# Patient Record
Sex: MALE | Race: WHITE | Employment: OTHER | URBAN - METROPOLITAN AREA
[De-identification: names, ages, dates, MRNs, and addresses within clinical notes are randomized per-mention and may not be internally consistent; named-entity substitution may affect disease eponyms.]

---

## 2019-12-17 PROBLEM — I10 ESSENTIAL HYPERTENSION: Status: ACTIVE | Noted: 2019-12-17

## 2019-12-17 PROBLEM — R00.2 PALPITATIONS: Status: ACTIVE | Noted: 2019-12-17

## 2019-12-17 PROBLEM — E78.00 HYPERCHOLESTEREMIA: Status: ACTIVE | Noted: 2019-12-17

## 2019-12-17 PROBLEM — I25.10 CORONARY ARTERY DISEASE INVOLVING NATIVE CORONARY ARTERY OF NATIVE HEART WITHOUT ANGINA PECTORIS: Status: ACTIVE | Noted: 2019-12-17

## 2019-12-18 ENCOUNTER — OFFICE VISIT (OUTPATIENT)
Dept: CARDIOLOGY CLINIC | Age: 64
End: 2019-12-18
Payer: COMMERCIAL

## 2019-12-18 VITALS
BODY MASS INDEX: 29.52 KG/M2 | WEIGHT: 237.4 LBS | DIASTOLIC BLOOD PRESSURE: 80 MMHG | OXYGEN SATURATION: 91 % | HEIGHT: 75 IN | SYSTOLIC BLOOD PRESSURE: 110 MMHG | HEART RATE: 84 BPM

## 2019-12-18 DIAGNOSIS — R00.2 PALPITATIONS: ICD-10-CM

## 2019-12-18 DIAGNOSIS — I10 ESSENTIAL HYPERTENSION: ICD-10-CM

## 2019-12-18 DIAGNOSIS — E78.00 HYPERCHOLESTEREMIA: ICD-10-CM

## 2019-12-18 DIAGNOSIS — I25.10 CORONARY ARTERY DISEASE INVOLVING NATIVE CORONARY ARTERY OF NATIVE HEART WITHOUT ANGINA PECTORIS: Primary | ICD-10-CM

## 2019-12-18 PROCEDURE — 99244 OFF/OP CNSLTJ NEW/EST MOD 40: CPT | Performed by: INTERNAL MEDICINE

## 2019-12-18 PROCEDURE — 3017F COLORECTAL CA SCREEN DOC REV: CPT | Performed by: INTERNAL MEDICINE

## 2019-12-18 PROCEDURE — 93000 ELECTROCARDIOGRAM COMPLETE: CPT | Performed by: INTERNAL MEDICINE

## 2019-12-18 PROCEDURE — G8484 FLU IMMUNIZE NO ADMIN: HCPCS | Performed by: INTERNAL MEDICINE

## 2019-12-18 PROCEDURE — 1036F TOBACCO NON-USER: CPT | Performed by: INTERNAL MEDICINE

## 2019-12-18 PROCEDURE — G8417 CALC BMI ABV UP PARAM F/U: HCPCS | Performed by: INTERNAL MEDICINE

## 2019-12-18 PROCEDURE — G8598 ASA/ANTIPLAT THER USED: HCPCS | Performed by: INTERNAL MEDICINE

## 2019-12-18 PROCEDURE — G8427 DOCREV CUR MEDS BY ELIG CLIN: HCPCS | Performed by: INTERNAL MEDICINE

## 2019-12-18 PROCEDURE — 1123F ACP DISCUSS/DSCN MKR DOCD: CPT | Performed by: INTERNAL MEDICINE

## 2019-12-18 PROCEDURE — 4040F PNEUMOC VAC/ADMIN/RCVD: CPT | Performed by: INTERNAL MEDICINE

## 2019-12-18 RX ORDER — CARVEDILOL 6.25 MG/1
6.25 TABLET ORAL DAILY
COMMUNITY
Start: 2019-09-30 | End: 2021-12-08

## 2019-12-18 RX ORDER — ATORVASTATIN CALCIUM 40 MG/1
TABLET, FILM COATED ORAL
COMMUNITY
Start: 2017-06-14 | End: 2020-04-22

## 2019-12-18 RX ORDER — CLOPIDOGREL BISULFATE 75 MG/1
TABLET ORAL
COMMUNITY
Start: 2014-02-26 | End: 2021-10-21 | Stop reason: SDUPTHER

## 2019-12-18 RX ORDER — FAMOTIDINE 20 MG/1
20 TABLET, FILM COATED ORAL 2 TIMES DAILY
COMMUNITY
End: 2020-06-23 | Stop reason: SDUPTHER

## 2019-12-18 RX ORDER — RANOLAZINE 500 MG/1
TABLET, EXTENDED RELEASE ORAL
COMMUNITY
Start: 2014-11-09 | End: 2020-10-16 | Stop reason: SDUPTHER

## 2019-12-18 RX ORDER — ASPIRIN 81 MG/1
81 TABLET, CHEWABLE ORAL DAILY
COMMUNITY
Start: 2013-04-11

## 2019-12-18 RX ORDER — ATORVASTATIN CALCIUM 20 MG/1
20 TABLET, FILM COATED ORAL
COMMUNITY
End: 2020-04-22 | Stop reason: ALTCHOICE

## 2019-12-18 RX ORDER — LISINOPRIL 40 MG/1
TABLET ORAL
COMMUNITY
Start: 2019-10-03 | End: 2020-03-23 | Stop reason: SINTOL

## 2019-12-18 SDOH — HEALTH STABILITY: MENTAL HEALTH: HOW MANY STANDARD DRINKS CONTAINING ALCOHOL DO YOU HAVE ON A TYPICAL DAY?: 1 OR 2

## 2019-12-18 SDOH — HEALTH STABILITY: MENTAL HEALTH: HOW OFTEN DO YOU HAVE A DRINK CONTAINING ALCOHOL?: MONTHLY OR LESS

## 2020-03-23 ENCOUNTER — OFFICE VISIT (OUTPATIENT)
Dept: INTERNAL MEDICINE CLINIC | Age: 65
End: 2020-03-23
Payer: MEDICARE

## 2020-03-23 VITALS
HEART RATE: 80 BPM | BODY MASS INDEX: 29.27 KG/M2 | DIASTOLIC BLOOD PRESSURE: 64 MMHG | SYSTOLIC BLOOD PRESSURE: 110 MMHG | OXYGEN SATURATION: 98 % | WEIGHT: 235.4 LBS | HEIGHT: 75 IN

## 2020-03-23 DIAGNOSIS — I10 ESSENTIAL HYPERTENSION: ICD-10-CM

## 2020-03-23 DIAGNOSIS — E11.9 TYPE 2 DIABETES MELLITUS WITHOUT COMPLICATION, WITHOUT LONG-TERM CURRENT USE OF INSULIN (HCC): ICD-10-CM

## 2020-03-23 DIAGNOSIS — E78.5 HYPERLIPIDEMIA LDL GOAL <70: ICD-10-CM

## 2020-03-23 PROBLEM — H53.132 SUDDEN VISUAL LOSS OF LEFT EYE: Status: ACTIVE | Noted: 2020-03-23

## 2020-03-23 PROBLEM — K92.1 BLOODY STOOL: Status: ACTIVE | Noted: 2020-03-23

## 2020-03-23 PROBLEM — Z86.010 HISTORY OF COLON POLYPS: Status: ACTIVE | Noted: 2020-03-23

## 2020-03-23 PROBLEM — Z86.0100 HISTORY OF COLON POLYPS: Status: ACTIVE | Noted: 2020-03-23

## 2020-03-23 PROBLEM — I25.2 HISTORY OF MYOCARDIAL INFARCTION: Status: ACTIVE | Noted: 2020-03-23

## 2020-03-23 PROBLEM — Z72.0 TOBACCO USE: Status: ACTIVE | Noted: 2020-03-23

## 2020-03-23 LAB
A/G RATIO: 1.5 (ref 1.1–2.2)
ALBUMIN SERPL-MCNC: 4.3 G/DL (ref 3.4–5)
ALP BLD-CCNC: 65 U/L (ref 40–129)
ALT SERPL-CCNC: 21 U/L (ref 10–40)
ANION GAP SERPL CALCULATED.3IONS-SCNC: 13 MMOL/L (ref 3–16)
AST SERPL-CCNC: 19 U/L (ref 15–37)
BANDED NEUTROPHILS RELATIVE PERCENT: 2 % (ref 0–7)
BASOPHILS ABSOLUTE: 0.1 K/UL (ref 0–0.2)
BASOPHILS RELATIVE PERCENT: 1 %
BILIRUB SERPL-MCNC: 0.4 MG/DL (ref 0–1)
BUN BLDV-MCNC: 15 MG/DL (ref 7–20)
CALCIUM SERPL-MCNC: 9.7 MG/DL (ref 8.3–10.6)
CHLORIDE BLD-SCNC: 97 MMOL/L (ref 99–110)
CHOLESTEROL, TOTAL: 161 MG/DL (ref 0–199)
CO2: 28 MMOL/L (ref 21–32)
CREAT SERPL-MCNC: 1 MG/DL (ref 0.8–1.3)
EOSINOPHILS ABSOLUTE: 1.1 K/UL (ref 0–0.6)
EOSINOPHILS RELATIVE PERCENT: 8 %
GFR AFRICAN AMERICAN: >60
GFR NON-AFRICAN AMERICAN: >60
GLOBULIN: 2.8 G/DL
GLUCOSE BLD-MCNC: 136 MG/DL (ref 70–99)
HCT VFR BLD CALC: 43.2 % (ref 40.5–52.5)
HDLC SERPL-MCNC: 44 MG/DL (ref 40–60)
HEMOGLOBIN: 14.4 G/DL (ref 13.5–17.5)
LDL CHOLESTEROL CALCULATED: 81 MG/DL
LYMPHOCYTES ABSOLUTE: 2 K/UL (ref 1–5.1)
LYMPHOCYTES RELATIVE PERCENT: 15 %
MCH RBC QN AUTO: 31.8 PG (ref 26–34)
MCHC RBC AUTO-ENTMCNC: 33.3 G/DL (ref 31–36)
MCV RBC AUTO: 95.4 FL (ref 80–100)
MONOCYTES ABSOLUTE: 0.5 K/UL (ref 0–1.3)
MONOCYTES RELATIVE PERCENT: 4 %
MYELOCYTE PERCENT: 2 %
NEUTROPHILS ABSOLUTE: 9.6 K/UL (ref 1.7–7.7)
NEUTROPHILS RELATIVE PERCENT: 68 %
PDW BLD-RTO: 13.9 % (ref 12.4–15.4)
PLATELET # BLD: 233 K/UL (ref 135–450)
PMV BLD AUTO: 8.9 FL (ref 5–10.5)
POTASSIUM SERPL-SCNC: 4.7 MMOL/L (ref 3.5–5.1)
RBC # BLD: 4.52 M/UL (ref 4.2–5.9)
SLIDE REVIEW: ABNORMAL
SODIUM BLD-SCNC: 138 MMOL/L (ref 136–145)
TOTAL PROTEIN: 7.1 G/DL (ref 6.4–8.2)
TRIGL SERPL-MCNC: 180 MG/DL (ref 0–150)
VLDLC SERPL CALC-MCNC: 36 MG/DL
WBC # BLD: 13.4 K/UL (ref 4–11)

## 2020-03-23 PROCEDURE — 90670 PCV13 VACCINE IM: CPT | Performed by: INTERNAL MEDICINE

## 2020-03-23 PROCEDURE — G8484 FLU IMMUNIZE NO ADMIN: HCPCS | Performed by: INTERNAL MEDICINE

## 2020-03-23 PROCEDURE — 99203 OFFICE O/P NEW LOW 30 MIN: CPT | Performed by: INTERNAL MEDICINE

## 2020-03-23 PROCEDURE — 1123F ACP DISCUSS/DSCN MKR DOCD: CPT | Performed by: INTERNAL MEDICINE

## 2020-03-23 PROCEDURE — 2022F DILAT RTA XM EVC RTNOPTHY: CPT | Performed by: INTERNAL MEDICINE

## 2020-03-23 PROCEDURE — G0009 ADMIN PNEUMOCOCCAL VACCINE: HCPCS | Performed by: INTERNAL MEDICINE

## 2020-03-23 PROCEDURE — G8427 DOCREV CUR MEDS BY ELIG CLIN: HCPCS | Performed by: INTERNAL MEDICINE

## 2020-03-23 PROCEDURE — 4040F PNEUMOC VAC/ADMIN/RCVD: CPT | Performed by: INTERNAL MEDICINE

## 2020-03-23 PROCEDURE — 4004F PT TOBACCO SCREEN RCVD TLK: CPT | Performed by: INTERNAL MEDICINE

## 2020-03-23 PROCEDURE — G8417 CALC BMI ABV UP PARAM F/U: HCPCS | Performed by: INTERNAL MEDICINE

## 2020-03-23 PROCEDURE — 3046F HEMOGLOBIN A1C LEVEL >9.0%: CPT | Performed by: INTERNAL MEDICINE

## 2020-03-23 PROCEDURE — 3017F COLORECTAL CA SCREEN DOC REV: CPT | Performed by: INTERNAL MEDICINE

## 2020-03-23 RX ORDER — LOSARTAN POTASSIUM 100 MG/1
100 TABLET ORAL DAILY
COMMUNITY
End: 2020-09-01 | Stop reason: SDUPTHER

## 2020-03-23 RX ORDER — VARENICLINE TARTRATE 0.5 MG/1
.5-1 TABLET, FILM COATED ORAL SEE ADMIN INSTRUCTIONS
Qty: 57 TABLET | Refills: 0 | Status: SHIPPED | OUTPATIENT
Start: 2020-03-23 | End: 2020-12-14 | Stop reason: ALTCHOICE

## 2020-03-23 RX ORDER — VARENICLINE TARTRATE 1 MG/1
1 TABLET, FILM COATED ORAL 2 TIMES DAILY
Qty: 180 TABLET | Refills: 1 | Status: SHIPPED | OUTPATIENT
Start: 2020-03-23 | End: 2020-08-05

## 2020-03-23 ASSESSMENT — PATIENT HEALTH QUESTIONNAIRE - PHQ9
2. FEELING DOWN, DEPRESSED OR HOPELESS: 0
SUM OF ALL RESPONSES TO PHQ9 QUESTIONS 1 & 2: 0
SUM OF ALL RESPONSES TO PHQ QUESTIONS 1-9: 0
1. LITTLE INTEREST OR PLEASURE IN DOING THINGS: 0
SUM OF ALL RESPONSES TO PHQ QUESTIONS 1-9: 0

## 2020-03-23 ASSESSMENT — ENCOUNTER SYMPTOMS
NAUSEA: 0
BLOOD IN STOOL: 1
CONSTIPATION: 0
VOMITING: 0
DIARRHEA: 0
ABDOMINAL PAIN: 0
SHORTNESS OF BREATH: 0
CHEST TIGHTNESS: 0
RECTAL PAIN: 0

## 2020-03-23 NOTE — PROGRESS NOTES
Patient: Foreign Davis is a 71 y.o. male who presents today with the following Chief Complaint(s):  No chief complaint on file. HPI     Here today to establish. PMHX:  1. HTN-  Had rash and fatigue with lisinopril. Doing well on Coreg and losartan. Was having really low blood pressures with lisinopril. 2. CAD/MI- follows with Dr. Navi Quintanilla. S/p MI in 2014 w/stent. 3. DM 2- on metformin 500 mg BID. Last HbA1c was 6.3%. last done about 4 months ago. Recently on Prednisone 10 mg BID x 21 days d/t drug rash from lisinopril. Last dose of prednisone was about 4 days ago. Sugars have still been elevated around 200.   4. HLD- on Lipitor 60 mg qd. Had colonoscopy about 16 months ago. Found 2 polyps. Has recently some blood in stools- noticed about 2 weeks ago with 1 episode. Repeat in 5 years. Done at [facility]. Does smoke 1 ppd- started at age 24. Wants to quit. Struggles. Has tried patches and gum which did not help. Lost vision in his left eye about 7 months ago. Saw ophtho and was told was d/t restricted blood flow to eye. PCP told him he was fine. Has not improved. Allergies   Allergen Reactions    Influenza Vaccines Rash    Lisinopril Rash      Past Medical History:   Diagnosis Date    CAD (coronary artery disease)     Hyperlipidemia     Hypertension     Type 2 diabetes mellitus without complication [facility])       Past Surgical History:   Procedure Laterality Date    PTCA  2104    PTCA/Stent x 1 vessel.  following MI      Social History     Socioeconomic History    Marital status: Single     Spouse name: Not on file    Number of children: Not on file    Years of education: Not on file    Highest education level: Not on file   Occupational History    Not on file   Social Needs    Financial resource strain: Not on file    Food insecurity     Worry: Not on file     Inability: Not on file    Transportation needs     Medical: Not on file     Non-medical: Not on file   Tobacco Use    Lymphadenopathy:      Cervical: No cervical adenopathy. Neurological:      Mental Status: He is alert. Psychiatric:         Mood and Affect: Mood normal.         Behavior: Behavior normal. Behavior is cooperative. ASSESSMENT/PLAN:    Problem List Items Addressed This Visit     Bloody stool     Patient is up-to-date with colonoscopy. His most recent colonoscopy was in 2018 at [facility] and did reveal 2 small polyps. Given that he is only had one episode of bloody stool a few weeks ago and is otherwise asymptomatic, will hold off on referral to GI. Should this happen again, will then refer back to GI and can decide if he needs sooner colonoscopy than 5 years. Coronary artery disease involving native coronary artery of native heart without angina pectoris     Follows with Dr. Yvonne Kelsey for cardiology. On aspirin, Plavix, Ranexa, Coreg, and losartan. Relevant Medications    losartan (COZAAR) 100 MG tablet    Essential hypertension     Recently developed a rash and very low blood pressures with lisinopril. Currently on losartan 100 mg daily and Coreg 6.25 mg twice daily. Is feeling very well. Relevant Orders    CBC Auto Differential    Comprehensive Metabolic Panel    History of colon polyps     Found on colonoscopy in 2018. Colonoscopy was done at [facility]. Is due for follow-up colonoscopy in 2023. History of myocardial infarction     Patient had a myocardial infarction around 2014 and underwent cardiac catheterization with PTCA and stent x1 vessel. He follows with Dr. Yvonne Kelsey for cardiology. Currently on Lipitor 60 mg daily, Coreg 6.25 mg twice daily, Plavix 75 mg daily, losartan 100 mg daily, and aspirin 81 mg daily. Hyperlipidemia LDL goal <70     Check labs. Currently on Lipitor 60 mg daily.          Relevant Medications    losartan (COZAAR) 100 MG tablet    Other Relevant Orders    Comprehensive Metabolic Panel    Lipid Panel    Sudden visual

## 2020-03-23 NOTE — ASSESSMENT & PLAN NOTE
Recently developed a rash and very low blood pressures with lisinopril. Currently on losartan 100 mg daily and Coreg 6.25 mg twice daily. Is feeling very well.

## 2020-03-23 NOTE — ASSESSMENT & PLAN NOTE
Most recent hemoglobin A1c was 6.3%. Continue metformin 500 mg twice daily. Check labs. On losartan 100 mg daily.

## 2020-03-23 NOTE — PATIENT INSTRUCTIONS
Start Chantix and pick a quit date 1-2 weeks after you start the medication. Chantix will help decrease your craving for cigarettes and sometimes people stop smoking sooner than their chosen quit date. I do recommend taking Chantix for at least 3 months, ideally 6 months. You may use nicotine replacement, such as nicotine gum or patches, along with Chantix. The most common side effects with Chantix are nausea, so make sure to take with a full meal, and nightmares. These may be dose dependent, meaning that at lower doses you will not have the side effects. If this is the case, we can drop your dose down to the the lower dose which is often effective. Rarely, patients may experience worsening depression/new onset depression and/or suicidal thoughts. If this happens, please stop Chantix immediately and call the office.

## 2020-03-23 NOTE — ASSESSMENT & PLAN NOTE
Patient had abrupt loss of visual field in his left eye. He did see ophthalmology and was told that this likely represented a problem with the blood flow in his eye. He had a CT of his head in August that was unremarkable and was told by his PCP that there was no problem with his eye. Recommend checking MRI once pandemic activity subsides.

## 2020-03-23 NOTE — ASSESSMENT & PLAN NOTE
Found on colonoscopy in 2018. Colonoscopy was done at [facility]. Is due for follow-up colonoscopy in 2023.

## 2020-03-24 LAB
ESTIMATED AVERAGE GLUCOSE: 148.5 MG/DL
HBA1C MFR BLD: 6.8 %

## 2020-04-22 RX ORDER — ATORVASTATIN CALCIUM 40 MG/1
60 TABLET, FILM COATED ORAL DAILY
Qty: 135 TABLET | Refills: 3 | Status: SHIPPED | OUTPATIENT
Start: 2020-04-22 | End: 2020-09-09 | Stop reason: SINTOL

## 2020-04-27 DIAGNOSIS — D72.829 LEUKOCYTOSIS, UNSPECIFIED TYPE: ICD-10-CM

## 2020-04-27 LAB
BANDED NEUTROPHILS RELATIVE PERCENT: 2 % (ref 0–7)
BASOPHILS ABSOLUTE: 0 K/UL (ref 0–0.2)
BASOPHILS RELATIVE PERCENT: 0 %
EOSINOPHILS ABSOLUTE: 0.7 K/UL (ref 0–0.6)
EOSINOPHILS RELATIVE PERCENT: 6 %
HCT VFR BLD CALC: 42 % (ref 40.5–52.5)
HEMOGLOBIN: 14.2 G/DL (ref 13.5–17.5)
LYMPHOCYTES ABSOLUTE: 2.1 K/UL (ref 1–5.1)
LYMPHOCYTES RELATIVE PERCENT: 19 %
MCH RBC QN AUTO: 31.4 PG (ref 26–34)
MCHC RBC AUTO-ENTMCNC: 33.8 G/DL (ref 31–36)
MCV RBC AUTO: 92.8 FL (ref 80–100)
MONOCYTES ABSOLUTE: 0.4 K/UL (ref 0–1.3)
MONOCYTES RELATIVE PERCENT: 4 %
NEUTROPHILS ABSOLUTE: 7.8 K/UL (ref 1.7–7.7)
NEUTROPHILS RELATIVE PERCENT: 69 %
PDW BLD-RTO: 13.1 % (ref 12.4–15.4)
PLATELET # BLD: 210 K/UL (ref 135–450)
PMV BLD AUTO: 8.9 FL (ref 5–10.5)
RBC # BLD: 4.53 M/UL (ref 4.2–5.9)
WBC # BLD: 11 K/UL (ref 4–11)

## 2020-05-05 ENCOUNTER — TELEPHONE (OUTPATIENT)
Dept: INTERNAL MEDICINE CLINIC | Age: 65
End: 2020-05-05

## 2020-05-07 ENCOUNTER — HOSPITAL ENCOUNTER (OUTPATIENT)
Dept: GENERAL RADIOLOGY | Age: 65
Discharge: HOME | End: 2020-05-07
Payer: MEDICARE

## 2020-05-07 ENCOUNTER — HOSPITAL ENCOUNTER (OUTPATIENT)
Age: 65
Discharge: HOME | End: 2020-05-07
Payer: MEDICARE

## 2020-05-07 PROCEDURE — 70030 X-RAY EYE FOR FOREIGN BODY: CPT

## 2020-05-11 ENCOUNTER — HOSPITAL ENCOUNTER (OUTPATIENT)
Dept: MRI IMAGING | Age: 65
Discharge: HOME | End: 2020-05-11
Payer: MEDICARE

## 2020-05-11 PROCEDURE — 70551 MRI BRAIN STEM W/O DYE: CPT

## 2020-05-13 ENCOUNTER — TELEPHONE (OUTPATIENT)
Dept: INTERNAL MEDICINE CLINIC | Age: 65
End: 2020-05-13

## 2020-06-23 ENCOUNTER — TELEPHONE (OUTPATIENT)
Dept: INTERNAL MEDICINE CLINIC | Age: 65
End: 2020-06-23

## 2020-06-23 RX ORDER — FAMOTIDINE 20 MG/1
20 TABLET, FILM COATED ORAL 2 TIMES DAILY
Qty: 60 TABLET | Refills: 1 | Status: SHIPPED | OUTPATIENT
Start: 2020-06-23 | End: 2020-07-22

## 2020-07-14 ENCOUNTER — TELEPHONE (OUTPATIENT)
Dept: INTERNAL MEDICINE CLINIC | Age: 65
End: 2020-07-14

## 2020-07-22 RX ORDER — FAMOTIDINE 20 MG/1
TABLET, FILM COATED ORAL
Qty: 60 TABLET | Refills: 1 | Status: SHIPPED | OUTPATIENT
Start: 2020-07-22 | End: 2020-08-28

## 2020-08-05 RX ORDER — VARENICLINE TARTRATE 1 MG/1
TABLET, FILM COATED ORAL
Qty: 168 TABLET | Refills: 2 | Status: SHIPPED | OUTPATIENT
Start: 2020-08-05 | End: 2021-05-13

## 2020-08-28 RX ORDER — FAMOTIDINE 20 MG/1
TABLET, FILM COATED ORAL
Qty: 60 TABLET | Refills: 1 | Status: SHIPPED | OUTPATIENT
Start: 2020-08-28 | End: 2020-12-14 | Stop reason: ALTCHOICE

## 2020-09-01 ENCOUNTER — OFFICE VISIT (OUTPATIENT)
Dept: INTERNAL MEDICINE CLINIC | Age: 65
End: 2020-09-01
Payer: MEDICARE

## 2020-09-01 VITALS
HEART RATE: 80 BPM | DIASTOLIC BLOOD PRESSURE: 64 MMHG | BODY MASS INDEX: 29.47 KG/M2 | WEIGHT: 235.8 LBS | SYSTOLIC BLOOD PRESSURE: 102 MMHG | TEMPERATURE: 97.3 F | OXYGEN SATURATION: 99 %

## 2020-09-01 DIAGNOSIS — R29.898 WEAKNESS OF BOTH HIPS: ICD-10-CM

## 2020-09-01 LAB
SEDIMENTATION RATE, ERYTHROCYTE: 17 MM/HR (ref 0–20)
TOTAL CK: 43 U/L (ref 39–308)

## 2020-09-01 PROCEDURE — 4040F PNEUMOC VAC/ADMIN/RCVD: CPT | Performed by: INTERNAL MEDICINE

## 2020-09-01 PROCEDURE — 1036F TOBACCO NON-USER: CPT | Performed by: INTERNAL MEDICINE

## 2020-09-01 PROCEDURE — 3017F COLORECTAL CA SCREEN DOC REV: CPT | Performed by: INTERNAL MEDICINE

## 2020-09-01 PROCEDURE — 1123F ACP DISCUSS/DSCN MKR DOCD: CPT | Performed by: INTERNAL MEDICINE

## 2020-09-01 PROCEDURE — G8417 CALC BMI ABV UP PARAM F/U: HCPCS | Performed by: INTERNAL MEDICINE

## 2020-09-01 PROCEDURE — G8427 DOCREV CUR MEDS BY ELIG CLIN: HCPCS | Performed by: INTERNAL MEDICINE

## 2020-09-01 PROCEDURE — 3044F HG A1C LEVEL LT 7.0%: CPT | Performed by: INTERNAL MEDICINE

## 2020-09-01 PROCEDURE — 99214 OFFICE O/P EST MOD 30 MIN: CPT | Performed by: INTERNAL MEDICINE

## 2020-09-01 PROCEDURE — 2022F DILAT RTA XM EVC RTNOPTHY: CPT | Performed by: INTERNAL MEDICINE

## 2020-09-01 RX ORDER — LANCETS 30 GAUGE
1 EACH MISCELLANEOUS DAILY
Qty: 100 EACH | Refills: 5 | Status: SHIPPED | OUTPATIENT
Start: 2020-09-01 | End: 2021-06-22

## 2020-09-01 RX ORDER — LOSARTAN POTASSIUM 50 MG/1
50 TABLET ORAL DAILY
Qty: 90 TABLET | Refills: 3 | Status: SHIPPED | OUTPATIENT
Start: 2020-09-01 | End: 2021-01-25 | Stop reason: SDUPTHER

## 2020-09-01 NOTE — PATIENT INSTRUCTIONS
Hold Lipitor (atorvastatin) for 1 month. If your hip weakness gets better, please call and I will change your medication. If it does not, you should resume the Lipitor (atorvastatin).

## 2020-09-01 NOTE — PROGRESS NOTES
Patient: Ion Solano is a 76 y.o. male who presents today with the following Chief Complaint(s):  Chief Complaint   Patient presents with    Check-Up       HPI     Here today for follow up. Had blood work done at [facility] last week. Not available. Is c/o weakness in b/l legs. Has been going on for at least 1.5 years. No back pain. Seems to be about the same. Once he gets up and gets moving, he is fine. No pain, just weakness. DM- has not been checking sugars. No low sugars. HLD- has been on Lipitor x 3 years. On 60 mg qd. HTN- no issues with losartan or carvedilol. Allergies   Allergen Reactions    Influenza Vaccines Rash    Lisinopril Rash      Past Medical History:   Diagnosis Date    CAD (coronary artery disease)     Hyperlipidemia     Hypertension     Type 2 diabetes mellitus without complication [facility])       Past Surgical History:   Procedure Laterality Date    PTCA      PTCA/Stent x 1 vessel. following MI      Social History     Socioeconomic History    Marital status: Single     Spouse name: Not on file    Number of children: Not on file    Years of education: Not on file    Highest education level: Not on file   Occupational History    Not on file   Social Needs    Financial resource strain: Not on file    Food insecurity     Worry: Not on file     Inability: Not on file    Transportation needs     Medical: Not on file     Non-medical: Not on file   Tobacco Use    Smoking status: Former Smoker     Packs/day: 1.00     Years: 40.00     Pack years: 40.00     Start date: 0     Last attempt to quit: 2020     Years since quittin.2    Smokeless tobacco: Never Used    Tobacco comment: trying to quit   Substance and Sexual Activity    Alcohol use:  Yes     Alcohol/week: 1.0 standard drinks     Types: 1 Shots of liquor per week     Frequency: Monthly or less     Drinks per session: 1 or 2     Comment: weekly or less    Drug use: Not on file    Sexual tablet Take 100 mg by mouth daily       No facility-administered medications prior to visit. Patient'spast medical history, surgical history, family history, medications,  and allergies  were all reviewed and updated as appropriate today. Review of Systems   Constitutional: Negative for appetite change, fatigue and fever. Respiratory: Negative for chest tightness and shortness of breath. Cardiovascular: Negative for chest pain. Gastrointestinal: Negative for abdominal pain, constipation, diarrhea and nausea. Skin: Negative for rash. /64   Pulse 80   Temp 97.3 °F (36.3 °C)   Wt 235 lb 12.8 oz (107 kg)   SpO2 99%   BMI 29.47 kg/m²   Physical Exam  Vitals signs and nursing note reviewed. Constitutional:       Appearance: He is well-developed. He is not toxic-appearing. HENT:      Head: Normocephalic. Right Ear: Tympanic membrane, ear canal and external ear normal.      Left Ear: Tympanic membrane, ear canal and external ear normal.   Eyes:      General: No scleral icterus. Extraocular Movements: Extraocular movements intact. Conjunctiva/sclera: Conjunctivae normal.      Pupils: Pupils are equal, round, and reactive to light. Neck:      Thyroid: No thyroid mass or thyromegaly. Vascular: No carotid bruit. Cardiovascular:      Rate and Rhythm: Normal rate and regular rhythm. Pulses:           Dorsalis pedis pulses are 2+ on the right side and 2+ on the left side. Posterior tibial pulses are 2+ on the right side and 2+ on the left side. Heart sounds: Normal heart sounds. No murmur. Comments: No LE edema  Pulmonary:      Effort: Pulmonary effort is normal.      Breath sounds: Normal breath sounds. Abdominal:      Palpations: Abdomen is soft. Tenderness: There is no abdominal tenderness. Musculoskeletal:      Right foot: Normal range of motion. No deformity, bunion, Charcot foot, foot drop or prominent metatarsal heads.       Left foot: Normal range of motion. No deformity, bunion, foot drop or prominent metatarsal heads. Feet:      Right foot:      Protective Sensation: 10 sites tested. 10 sites sensed. Skin integrity: Skin integrity normal.      Toenail Condition: Right toenails are normal.      Left foot:      Protective Sensation: 10 sites tested. 10 sites sensed. Skin integrity: Skin integrity normal.      Toenail Condition: Left toenails are normal.   Lymphadenopathy:      Cervical: No cervical adenopathy. Neurological:      General: No focal deficit present. Mental Status: He is alert and oriented to person, place, and time. Cranial Nerves: No cranial nerve deficit. Gait: Gait abnormal (initially upon stading is slow, stiff. is better after taking a few steps). Psychiatric:         Mood and Affect: Mood normal.         Behavior: Behavior normal. Behavior is cooperative. ASSESSMENT/PLAN:    Problem List Items Addressed This Visit     Coronary artery disease involving native coronary artery of native heart without angina pectoris     Asymptomatic. Follows with Dr. Anna Smith. Relevant Medications    losartan (COZAAR) 50 MG tablet    Essential hypertension     Continue losartan 100 mg qd and carvedilol 6.25 mg BID. History of myocardial infarction     Asymptomatic. Remains on Lipitor 60 mg daily, Coreg 6.25 mg twice daily, Plavix 75 mg daily, losartan 100 mg daily, and aspirin 81 mg daily. Hyperlipidemia LDL goal <70     Labs from [facility] are not available. Currently on Lipitor 60 mg qd. Is having b/l hip weakness. Check CK. Hold Lipitor x 4 weeks and see if hip weakness resolves. If it does, will change to Crestor. May need to consider Repatha.           Relevant Medications    losartan (COZAAR) 50 MG tablet    Other Relevant Orders    Lipid Panel    TSH without Reflex    Type 2 diabetes mellitus without complication, without long-term current use of insulin ([facility] [address] Labs from [facility] are pending. Remains on metformin 500 mg BID. On losartan for renal protection. Relevant Orders     DIABETES FOOT EXAM (Completed)    Hemoglobin A1C    Microalbumin / Creatinine Urine Ratio    Comprehensive Metabolic Panel    Weakness of both hips - Primary     Currently on Lipitor 60 mg qd. Is having b/l hip weakness. Check CK. Hold Lipitor x 4 weeks and see if hip weakness resolves. If it does, will change to Crestor. May need to consider Repatha. Relevant Orders    CK (Completed)    SEDIMENTATION RATE (Completed)          Current Outpatient Medications   Medication Sig Dispense Refill    losartan (COZAAR) 50 MG tablet Take 1 tablet by mouth daily 90 tablet 3    Lancets MISC 1 each by Does not apply route daily 100 each 5    famotidine (PEPCID) 20 MG tablet TAKE 1 TABLET BY MOUTH TWICE A DAY 60 tablet 1    CHANTIX CONTINUING MONTH PARISA 1 MG tablet TAKE 1 TABLET BY MOUTH TWICE A  tablet 2    metFORMIN (GLUCOPHAGE) 500 MG tablet Take 1 tablet by mouth 2 times daily (with meals) 60 tablet 5    atorvastatin (LIPITOR) 40 MG tablet Take 1.5 tablets by mouth daily 135 tablet 3    varenicline (CHANTIX) 0.5 MG tablet Take 1-2 tablets by mouth See Admin Instructions 0.5mg DAILY for 3 days followed by 0.5mg TWICE DAILY for 4 days followed by 1mg TWICE DAILY 57 tablet 0    aspirin 81 MG chewable tablet Take 81 mg by mouth      carvedilol (COREG) 6.25 MG tablet TAKE 1 TABLET BY MOUTH 2 TIMES DAILY.  clopidogrel (PLAVIX) 75 MG tablet TAKE 1 TABLET BY MOUTH DAILY.  ranolazine (RANEXA) 500 MG extended release tablet TAKE 1 TABLET BY MOUTH 2 TIMES DAILY.  omeprazole (PRILOSEC) 40 MG delayed release capsule Take 1 capsule by mouth every morning (before breakfast) 30 capsule 5     No current facility-administered medications for this visit. Return in about 4 months (around 1/1/2021).

## 2020-09-02 ENCOUNTER — TELEPHONE (OUTPATIENT)
Dept: INTERNAL MEDICINE CLINIC | Age: 65
End: 2020-09-02

## 2020-09-04 ENCOUNTER — TELEPHONE (OUTPATIENT)
Dept: INTERNAL MEDICINE CLINIC | Age: 65
End: 2020-09-04

## 2020-09-04 RX ORDER — OMEPRAZOLE 40 MG/1
40 CAPSULE, DELAYED RELEASE ORAL
Qty: 30 CAPSULE | Refills: 5 | Status: SHIPPED | OUTPATIENT
Start: 2020-09-04 | End: 2020-09-09 | Stop reason: SDUPTHER

## 2020-09-06 ASSESSMENT — ENCOUNTER SYMPTOMS
NAUSEA: 0
CHEST TIGHTNESS: 0
DIARRHEA: 0
SHORTNESS OF BREATH: 0
ABDOMINAL PAIN: 0
CONSTIPATION: 0

## 2020-09-06 NOTE — ASSESSMENT & PLAN NOTE
Asymptomatic. Remains on Lipitor 60 mg daily, Coreg 6.25 mg twice daily, Plavix 75 mg daily, losartan 100 mg daily, and aspirin 81 mg daily.

## 2020-09-06 NOTE — ASSESSMENT & PLAN NOTE
Currently on Lipitor 60 mg qd. Is having b/l hip weakness. Check CK. Hold Lipitor x 4 weeks and see if hip weakness resolves. If it does, will change to Crestor. May need to consider Repatha.

## 2020-09-06 NOTE — ASSESSMENT & PLAN NOTE
Labs from [facility] are not available. Currently on Lipitor 60 mg qd. Is having b/l hip weakness. Check CK. Hold Lipitor x 4 weeks and see if hip weakness resolves. If it does, will change to Crestor. May need to consider Repatha.

## 2020-09-06 NOTE — ASSESSMENT & PLAN NOTE
Labs from [facility] are pending. Remains on metformin 500 mg BID. On losartan for renal protection.

## 2020-09-08 ENCOUNTER — TELEPHONE (OUTPATIENT)
Dept: INTERNAL MEDICINE CLINIC | Age: 65
End: 2020-09-08

## 2020-09-08 NOTE — TELEPHONE ENCOUNTER
----- Message from Marcello Jay sent at 9/8/2020  3:45 PM EDT -----  Subject: Message to Provider    QUESTIONS  Information for Provider? prescription was put in for wrong dosage   pt needs to discuss the cholesterol medication w/ along with lab   results. Pt # is [phone]  ---------------------------------------------------------------------------  --------------  CALL BACK INFO  What is the best way for the office to contact you? OK to leave message on   voicemail  Preferred Call Back Phone Number? [phone]  ---------------------------------------------------------------------------  --------------  SCRIPT ANSWERS  Relationship to Patient?  Self

## 2020-09-09 ENCOUNTER — TELEPHONE (OUTPATIENT)
Dept: INTERNAL MEDICINE CLINIC | Age: 65
End: 2020-09-09

## 2020-09-09 RX ORDER — OMEPRAZOLE 20 MG/1
20 CAPSULE, DELAYED RELEASE ORAL
Qty: 30 CAPSULE | Refills: 5 | Status: SHIPPED | OUTPATIENT
Start: 2020-09-09 | End: 2021-01-11

## 2020-09-09 RX ORDER — ROSUVASTATIN CALCIUM 10 MG/1
10 TABLET, COATED ORAL NIGHTLY
Qty: 30 TABLET | Refills: 3 | Status: SHIPPED | OUTPATIENT
Start: 2020-09-09 | End: 2021-01-11

## 2020-09-09 NOTE — TELEPHONE ENCOUNTER
Stay off of atorvastatin. After 4 weeks off of atorvastatin, start rosuvastatin (Crestor) 10 mg 1/2 pill every other day for 1 month. If you feel well on this dose, increase to 1/2 pill daily for 1 month. If you feel well on this dose, increase to 1 pill daily and stay on this dose unless you develop problems (muslce aches/pains worse than your typical muscle aches and pains). If you do develop difficulties, please decrease the dose back to the highest dose that you tolerated. Please note that the directions on the pill bottle will say 10 mg qd. Please follow the directions given over the phone.

## 2020-09-09 NOTE — TELEPHONE ENCOUNTER
Spoke with pt he is feeling much better being off the of the Cholesterol medication. His legs are a lot better. Omeprazole 20 mg.

## 2020-09-10 NOTE — TELEPHONE ENCOUNTER
Labs in general look good. Liver and kidney function is normal.  Hemoglobin A1c is 6.4%. Continue on metformin 500 mg twice daily with meals. Focus on low-carb diet. No need for additional medication at this time.

## 2020-10-12 ENCOUNTER — TELEPHONE (OUTPATIENT)
Dept: CARDIOLOGY CLINIC | Age: 65
End: 2020-10-12

## 2020-10-16 ENCOUNTER — TELEPHONE (OUTPATIENT)
Dept: CARDIOLOGY CLINIC | Age: 65
End: 2020-10-16

## 2020-10-16 NOTE — TELEPHONE ENCOUNTER
Medication Refill    Medication needing refilled:ranexa     Dosage of the medication:    How are you taking this medication (QD, BID, TID, QID, PRN):    30 or 90 day supply called in:    Which Pharmacy are we sending the medication to?:   [facility] [address] [place] .  SAYS HE CALLED 2 DAYS AGO

## 2020-10-19 RX ORDER — RANOLAZINE 500 MG/1
TABLET, EXTENDED RELEASE ORAL
Qty: 60 TABLET | Refills: 11 | Status: SHIPPED | OUTPATIENT
Start: 2020-10-19 | End: 2020-10-19

## 2020-10-19 RX ORDER — RANOLAZINE 500 MG/1
TABLET, EXTENDED RELEASE ORAL
Qty: 180 TABLET | Refills: 3 | Status: SHIPPED | OUTPATIENT
Start: 2020-10-19 | End: 2021-03-08

## 2020-10-27 ENCOUNTER — TELEPHONE (OUTPATIENT)
Dept: ADMINISTRATIVE | Age: 65
End: 2020-10-27

## 2020-10-29 ENCOUNTER — OFFICE VISIT (OUTPATIENT)
Dept: INTERNAL MEDICINE CLINIC | Age: 65
End: 2020-10-29
Payer: MEDICARE

## 2020-10-29 VITALS
DIASTOLIC BLOOD PRESSURE: 76 MMHG | BODY MASS INDEX: 29.47 KG/M2 | HEART RATE: 75 BPM | TEMPERATURE: 94.8 F | HEIGHT: 75 IN | SYSTOLIC BLOOD PRESSURE: 118 MMHG

## 2020-10-29 LAB
A/G RATIO: 1.7 (ref 1.1–2.2)
ALBUMIN SERPL-MCNC: 4.3 G/DL (ref 3.4–5)
ALP BLD-CCNC: 70 U/L (ref 40–129)
ALT SERPL-CCNC: 47 U/L (ref 10–40)
ANION GAP SERPL CALCULATED.3IONS-SCNC: 8 MMOL/L (ref 3–16)
AST SERPL-CCNC: 27 U/L (ref 15–37)
BASOPHILS ABSOLUTE: 0.2 K/UL (ref 0–0.2)
BASOPHILS RELATIVE PERCENT: 2 %
BILIRUB SERPL-MCNC: 0.4 MG/DL (ref 0–1)
BUN BLDV-MCNC: 13 MG/DL (ref 7–20)
CALCIUM SERPL-MCNC: 9.8 MG/DL (ref 8.3–10.6)
CHLORIDE BLD-SCNC: 101 MMOL/L (ref 99–110)
CO2: 29 MMOL/L (ref 21–32)
CREAT SERPL-MCNC: 1 MG/DL (ref 0.8–1.3)
EOSINOPHILS ABSOLUTE: 0.3 K/UL (ref 0–0.6)
EOSINOPHILS RELATIVE PERCENT: 4 %
GFR AFRICAN AMERICAN: >60
GFR NON-AFRICAN AMERICAN: >60
GLOBULIN: 2.6 G/DL
GLUCOSE BLD-MCNC: 198 MG/DL (ref 70–99)
HCT VFR BLD CALC: 38.9 % (ref 40.5–52.5)
HEMOGLOBIN: 13.4 G/DL (ref 13.5–17.5)
LYMPHOCYTES ABSOLUTE: 1.8 K/UL (ref 1–5.1)
LYMPHOCYTES RELATIVE PERCENT: 21 %
MCH RBC QN AUTO: 31.3 PG (ref 26–34)
MCHC RBC AUTO-ENTMCNC: 34.4 G/DL (ref 31–36)
MCV RBC AUTO: 91.1 FL (ref 80–100)
MONOCYTES ABSOLUTE: 0.6 K/UL (ref 0–1.3)
MONOCYTES RELATIVE PERCENT: 7 %
NEUTROPHILS ABSOLUTE: 5.7 K/UL (ref 1.7–7.7)
NEUTROPHILS RELATIVE PERCENT: 66 %
PDW BLD-RTO: 13.2 % (ref 12.4–15.4)
PLATELET # BLD: 221 K/UL (ref 135–450)
PMV BLD AUTO: 8.8 FL (ref 5–10.5)
POTASSIUM SERPL-SCNC: 4.6 MMOL/L (ref 3.5–5.1)
RBC # BLD: 4.27 M/UL (ref 4.2–5.9)
SLIDE REVIEW: ABNORMAL
SODIUM BLD-SCNC: 138 MMOL/L (ref 136–145)
TOTAL PROTEIN: 6.9 G/DL (ref 6.4–8.2)
WBC # BLD: 8.7 K/UL (ref 4–11)

## 2020-10-29 PROCEDURE — 1123F ACP DISCUSS/DSCN MKR DOCD: CPT | Performed by: NURSE PRACTITIONER

## 2020-10-29 PROCEDURE — 1036F TOBACCO NON-USER: CPT | Performed by: NURSE PRACTITIONER

## 2020-10-29 PROCEDURE — G8427 DOCREV CUR MEDS BY ELIG CLIN: HCPCS | Performed by: NURSE PRACTITIONER

## 2020-10-29 PROCEDURE — 3017F COLORECTAL CA SCREEN DOC REV: CPT | Performed by: NURSE PRACTITIONER

## 2020-10-29 PROCEDURE — G8484 FLU IMMUNIZE NO ADMIN: HCPCS | Performed by: NURSE PRACTITIONER

## 2020-10-29 PROCEDURE — 4040F PNEUMOC VAC/ADMIN/RCVD: CPT | Performed by: NURSE PRACTITIONER

## 2020-10-29 PROCEDURE — 93000 ELECTROCARDIOGRAM COMPLETE: CPT | Performed by: NURSE PRACTITIONER

## 2020-10-29 PROCEDURE — 99214 OFFICE O/P EST MOD 30 MIN: CPT | Performed by: NURSE PRACTITIONER

## 2020-10-29 PROCEDURE — G8417 CALC BMI ABV UP PARAM F/U: HCPCS | Performed by: NURSE PRACTITIONER

## 2020-10-29 NOTE — PROGRESS NOTES
Take 81 mg by mouth      carvedilol (COREG) 6.25 MG tablet TAKE 1 TABLET BY MOUTH 2 TIMES DAILY.  clopidogrel (PLAVIX) 75 MG tablet TAKE 1 TABLET BY MOUTH DAILY. No current facility-administered medications for this visit. Patient Active Problem List   Diagnosis    Coronary artery disease involving native coronary artery of native heart without angina pectoris    Essential hypertension    Hypercholesteremia    Palpitations    Bloody stool    History of colon polyps    Type 2 diabetes mellitus without complication, without long-term current use of insulin ([facility])    History of myocardial infarction    Tobacco use    Sudden visual loss of left eye    Weakness of both hips       Past Medical History:   Diagnosis Date    CAD (coronary artery disease)     Hyperlipidemia     Hypertension     Type 2 diabetes mellitus without complication ([address]        Past Surgical History:   Procedure Laterality Date    PTCA      PTCA/Stent x 1 vessel. following MI       Family History   Problem Relation Age of Onset    Breast Cancer Mother 52    Heart Attack Father 64    Hypertension Father     Cancer Sister 43    Drug Abuse Sister 62            Heart Attack Maternal Grandfather 52    Heart Attack Son 29         Review of Systems  A comprehensive review of systems was negative except for what was noted in the HPI. Physical Exam   Vitals:    10/29/20 1438   BP: 118/76   Pulse: 75   Temp: 94.8 °F (34.9 °C)   TempSrc: Temporal   Height: 6' 3\" (1.905 m)        Constitutional: He is oriented to person, place, and time. He appears well-developed and well-nourished. No distress. HENT:   Head: Normocephalic and atraumatic. Mouth/Throat: Uvula is midline, oropharynx is clear and moist and mucous membranes are normal.   Eyes: Conjunctivae and EOM are normal.   Neck: Trachea normal and normal range of motion. Neck supple.    Cardiovascular: Normal rate, regular rhythm, normal heart sounds and intact distal pulses. Pulmonary/Chest: Effort normal and breath sounds normal. No respiratory distress. He has no wheezes. He has no rales. Abdominal: Soft, non-tender. Bowel sounds are normal.   Musculoskeletal: He exhibits no edema and no tenderness. Neurological: He is alert and oriented to person, place, and time. Skin: Skin is warm and dry. No rash noted. No erythema. Psychiatric: He has a normal mood and affect. His behavior is normal.       EKG Interpretation: SR, rate 69  Lab Review: PENDING     Assessment:     76 y.o. patient with planned surgery as above. Known risk factors for perioperative complications: Coronary artery disease, Diabetes mellitus, Hypertension     Plan:     1. Preoperative workup as follows: none  2. Change in medication regimen before surgery: Patient will discuss stopping Plavix with cardiologist   3. Prophylaxis for cardiac events with perioperative beta-blockers: Currently taking  carvedilol  4. Deep vein thrombosis prophylaxis: regimen to be chosen by surgical team  5. No contraindications to planned surgery pending surgery center requested lab results.       Diaz Dyson, [address] [place] [address],[address] Internal Medicine and Rheumatology  [phone]

## 2020-11-02 ENCOUNTER — TELEPHONE (OUTPATIENT)
Dept: ADMINISTRATIVE | Age: 65
End: 2020-11-02

## 2020-11-24 ENCOUNTER — TELEPHONE (OUTPATIENT)
Dept: INTERNAL MEDICINE CLINIC | Age: 65
End: 2020-11-24

## 2020-11-24 NOTE — TELEPHONE ENCOUNTER
Called- patient has ischemic optic neuritis. Needs sleep study, ESR. Please let patient know that I have spoken to Dr. Joan Castelan and ordered the requested blood tests and placed a referral to Dr. Monico Nageotte for sleep management.

## 2020-12-08 ENCOUNTER — TELEPHONE (OUTPATIENT)
Dept: INTERNAL MEDICINE CLINIC | Age: 65
End: 2020-12-08

## 2020-12-08 NOTE — TELEPHONE ENCOUNTER
----- Message from [facility] sent at 12/4/2020  2:19 PM EST -----  Subject: Referral Request    QUESTIONS   Reason for referral request? Patient had an opthamologist appointment 10   days ago. The opthamologist was supposed to contact the PCP to set that up   for patient to have sleep study test. Please advise. Has the physician seen you for this condition before? No   Preferred Specialist (if applicable)? Do you already have an appointment scheduled? No  Additional Information for Provider? Patient was under the impression that   the opthamologist was calling the practice to get this set up for patient   but it has been 10 days. ---------------------------------------------------------------------------  --------------  Mine TO  What is the best way for the office to contact you? OK to leave message on   voicemail  Preferred Call Back Phone Number?  [phone]

## 2020-12-09 NOTE — TELEPHONE ENCOUNTER
Pt is stating that the last time he received a flu shot he had a reaction. He hands and face swollen.

## 2020-12-09 NOTE — TELEPHONE ENCOUNTER
I have referred him for a sleep study after I spoke to Dr. Myles Lema. The referral is in his chart to Dr. Princess Mckenzie.    saw

## 2020-12-14 ENCOUNTER — OFFICE VISIT (OUTPATIENT)
Dept: CARDIOLOGY CLINIC | Age: 65
End: 2020-12-14
Payer: MEDICARE

## 2020-12-14 PROCEDURE — G8484 FLU IMMUNIZE NO ADMIN: HCPCS | Performed by: NURSE PRACTITIONER

## 2020-12-14 PROCEDURE — 99214 OFFICE O/P EST MOD 30 MIN: CPT | Performed by: NURSE PRACTITIONER

## 2020-12-14 PROCEDURE — 1036F TOBACCO NON-USER: CPT | Performed by: NURSE PRACTITIONER

## 2020-12-14 PROCEDURE — G8427 DOCREV CUR MEDS BY ELIG CLIN: HCPCS | Performed by: NURSE PRACTITIONER

## 2020-12-14 PROCEDURE — 4040F PNEUMOC VAC/ADMIN/RCVD: CPT | Performed by: NURSE PRACTITIONER

## 2020-12-14 PROCEDURE — 3017F COLORECTAL CA SCREEN DOC REV: CPT | Performed by: NURSE PRACTITIONER

## 2020-12-14 PROCEDURE — G8417 CALC BMI ABV UP PARAM F/U: HCPCS | Performed by: NURSE PRACTITIONER

## 2020-12-14 PROCEDURE — 1123F ACP DISCUSS/DSCN MKR DOCD: CPT | Performed by: NURSE PRACTITIONER

## 2020-12-14 NOTE — PROGRESS NOTES
[facility]     Outpatient Follow Up Note      CHIEF COMPLAINT / HPI:  Follow Up secondary to coronary artery disease    Subjective:   Inga Marie is 76 y.o. male who presents today with a history of CAD s/p PCI of Cx in 2012, HTN, HLD. Today, he denies significant chest pain. Notices some dyspnea on exertion when going up stairs that has been persistent over the last year. Pt thinks he is out of shape as he has not been exercising. Pt recently had foot operated on and will start routine exercise soon once that heals. The patient denies orthopnea/PND. The patient does have swelling isolated to his foot secondary to surgery. The patients weight is up 22lbs over last couple months that he attributes to diet. The patient is not experiencing palpitations or dizziness. Pt stopped smoking 4 months ago. Home BP avg 110/70s     These symptoms are worsening since the last OV. With regard to medication therapy the patient has been compliant with prescribed regimen. They have tolerated therapy to date.      Past Medical History:   Diagnosis Date    CAD (coronary artery disease)     Hyperlipidemia     Hypertension     Type 2 diabetes mellitus without complication (HCC)      Social History:    Social History     Tobacco Use   Smoking Status Former Smoker    Packs/day: 1.00    Years: 40.00    Pack years: 40.00    Start date: 0    Quit date: 2020    Years since quittin.5   Smokeless Tobacco Never Used   Tobacco Comment    trying to quit     Current Medications:  Current Outpatient Medications   Medication Sig Dispense Refill    ranolazine (RANEXA) 500 MG extended release tablet TAKE 1 TABLET BY MOUTH TWICE DAILY 180 tablet 3    omeprazole (PRILOSEC) 20 MG delayed release capsule Take 1 capsule by mouth every morning (before breakfast) 30 capsule 5    rosuvastatin (CRESTOR) 10 MG tablet Take 1 tablet by mouth nightly 30 tablet 3  losartan (COZAAR) 50 MG tablet Take 1 tablet by mouth daily 90 tablet 3    Lancets MISC 1 each by Does not apply route daily 100 each 5    famotidine (PEPCID) 20 MG tablet TAKE 1 TABLET BY MOUTH TWICE A DAY 60 tablet 1    CHANTIX CONTINUING MONTH PARISA 1 MG tablet TAKE 1 TABLET BY MOUTH TWICE A  tablet 2    metFORMIN (GLUCOPHAGE) 500 MG tablet Take 1 tablet by mouth 2 times daily (with meals) 60 tablet 5    varenicline (CHANTIX) 0.5 MG tablet Take 1-2 tablets by mouth See Admin Instructions 0.5mg DAILY for 3 days followed by 0.5mg TWICE DAILY for 4 days followed by 1mg TWICE DAILY 57 tablet 0    aspirin 81 MG chewable tablet Take 81 mg by mouth      carvedilol (COREG) 6.25 MG tablet TAKE 1 TABLET BY MOUTH 2 TIMES DAILY.  clopidogrel (PLAVIX) 75 MG tablet TAKE 1 TABLET BY MOUTH DAILY. No current facility-administered medications for this visit. REVIEW OF SYSTEMS:    CONSTITUTIONAL: No major weight gain or loss, night sweats, fever, fatigue, or weakness. HEENT: No new vision difficulties or ringing in the ears. RESPIRATORY:+ SOB, - PND, orthopnea or cough. CARDIOVASCULAR: See HPI  GI: No N/V/D, constipation, or abdominal pain. No black/tarry stools  : No urinary urgency, incontinence, or hematuria. SKIN: No cyanosis or skin lesions. MUSCULOSKELETAL: No new muscle or joint pain. Recent foot surgery   NEUROLOGICAL: No syncope or TIA-like symptoms.   PSYCHIATRIC: No anxiety, pain, insomnia or depression    Objective:   PHYSICAL EXAM:        Vitals:    12/14/20 1345 12/14/20 1405   BP: 130/86 104/64   Site: Left Upper Arm    Position: Sitting    Cuff Size: Medium Adult    Pulse: 91    SpO2: 97%    Weight: 253 lb (114.8 kg)    Height: 6' 3\" (1.905 m)       VITALS:  /64   Pulse 91   Ht 6' 3\" (1.905 m)   Wt 253 lb (114.8 kg)   SpO2 97%   BMI 31.62 kg/m²   CONSTITUTIONAL: Cooperative, no apparent distress, and appears well nourished / developed NEUROLOGIC:  Awake and orientated to person, place, and time. PSYCH: Calm affect. SKIN: Warm and dry. HEENT: Sclera non-icteric, normocephalic, neck supple. RESPIRATORY:  No increased work of breathing and clear to auscultation, no crackles or wheezing. CARDIOVASCULAR:  Regular rate and rhythm without murmur. Normal S1 and S2. No gallops or rubs. Normal PMI. No elevation of JVP. Normal carotid pulses with no bruits. GI:  Normal bowel sounds. Non-distended. Non-tender to palpation  EXT: No edema. No calf tenderness. Pulses are present bilaterally. DATA:    Lab Results   Component Value Date    ALT 47 (H) 10/29/2020    AST 27 10/29/2020    ALKPHOS 70 10/29/2020    BILITOT 0.4 10/29/2020     Lab Results   Component Value Date    CREATININE 1.0 10/29/2020    BUN 13 10/29/2020     10/29/2020    K 4.6 10/29/2020     10/29/2020    CO2 29 10/29/2020     No results found for: TSH, L4TMBXB, Z0VJYBW, THYROIDAB  Lab Results   Component Value Date    WBC 8.7 10/29/2020    HGB 13.4 (L) 10/29/2020    HCT 38.9 (L) 10/29/2020    MCV 91.1 10/29/2020     10/29/2020     No components found for: CHLPL  Lab Results   Component Value Date    TRIG 180 (H) 03/23/2020     Lab Results   Component Value Date    HDL 44 03/23/2020     Lab Results   Component Value Date    LDLCALC 81 03/23/2020     Lab Results   Component Value Date    LABVLDL 36 03/23/2020      No results found for: BNP  Radiology Review:  Pertinent images / reports were reviewed as a part of this visit and reveals the following:    Last Echo: 2/2015 ([place])  EF 55-60%, no significant valvular disease     Last Stress Test: 10/2017  1.  Normal myocardial perfusion study without evidence of ischemia or prior infarction  2.  Normal left ventricular function with calculated Ejection Fraction of 66  %  3.  Normal Lexiscan EKG portion of the exam    Last Angiogram: 11/8/2014  FINDINGS:  CORONARY FINDINGS:  DOMINANCE: Right. LEFT MAIN: No significant angiographic disease. LAD: Proximal segment is significantly ectatic. Mid to distal segment has mild diffuse disease. LEFT CIRCUMFLEX: Ostium has around 40% stenosis, which is unchanged from prior catheterization in 2013, at which time it was not significant by FFR measurement. There is a moderate caliber first obtuse marginal that has a widely patent stent in the proximal segment. RCA: Proximal segment is ectatic and has mild diffuse disease. There is a very small caliber RPDA branch that has 85% stenosis, unchanged from prior cardiac catheterization. There is a large posterolateral branch that has mild diffuse disease. LEFT HEART CATHETERIZATION:   LVEDP is 16 mmHg. Central aortic pressure 127/80. There was around 8 mm peak to peak gradient upon pullback. LEFT VENTRICULOGRAM: Normal LV systolic function, EF 14%. No significant mitral regurgitation. COMPLICATIONS: No immediate complications after the procedure. CONCLUSION:  1. Moderate coronary artery disease as described above. Patent first obtuse marginal stent. Ostial circumflex is unchanged from before, severe RPDA stenosis that is unchanged and vessel is very small in caliber and best treated medically and not a good target for PCI. 2. Normal left ventricular systolic function. 3. Mildly elevated LVEDP. PLAN:  1. Look for alternative causes of chest pain. 2. Continue current medical therapy. Last ECG: 10/29/20  Sinus  Rhythm   WITHIN NORMAL LIMITS    Assessment:     1. Coronary artery disease   ~ stable ; no c/o CP but does notice QUINTERO   ~ [facility] 2012: PCI of Cx ([facility])   ~ [facility] 2013: 60% ostial Cx, 85% OM1, 30% proxRCA, 98% RPL1->mod-high risk PCI, medically manage ([facility])  ~ [facility] 2014: patent stent with small vessel disease of PDA ([facility])   ~ [facility] 2017: normal myocardial perfusion   ~ ASA/ plavix/ coreg/ crestor/ ranexa     2. Hypertension   ~ controlled     3.  Hyperlipidemia   ~ controlled; LDL 81 (3/2020) ~ crestor 10    4. Palpations    ~ resolved     5. Dyspnea on exertion   ~ r/t deconditioning vs CAD    I had the opportunity to review the clinical symptoms and presentation of Johnathon Blackburn. Plan:     1. Stress echo with c/o worsening QUINTERO   2. Routine labs and lipids per PCP   3. Follow up in 1 year or sooner pending stress echo results     Overall the patient is stable from CV standpoint    I have addresed the patient's cardiac risk factors and adjusted pharmacologic treatment as needed. In addition, I have reinforced the need for patient directed risk factor modification. Further evaluation will be based upon the patient's clinical course and testing results. All questions and concerns were addressed to the patient. Alternatives to my treatment were discussed. The patient verbalizes understanding not to stop medications without discussing with us. The patient is not currently smoking. The risks related to smoking were reviewed with the patient. Recommend maintaining a smoke-free lifestyle. Patient is on a beta-blocker  Patient is on an ARB  Patient is on a statin    Dual Antiplatelet therapy has been recommended / prescribed for this patient. Education conducted on adverse reactions including bleeding were discussed. Angiotension receptor blocker has been prescribed / recommended. Daily weights, low sodium diet were discussed. Patient instructed to call the office with a weight gain: 3lbs over night and/or 5lbs in one week, swelling, shortness of breath, orthopnea, and/or PND. Discussed exercise: 30min of sustained cardiovascular exercise most days of the week   Discussed Low saturated fat / Cholesterol diet. Thank you for allowing us to participate in the care of Johnathon Blackburn.     Stepan TREADWELL-CNP  [facility]   Office: [phone]    Documentation of today's visit sent to PCP

## 2020-12-14 NOTE — PATIENT INSTRUCTIONS
Stress echocardiogram soon    Labs per your PCP     Try to eat a plant-based or Mediterranean-like diet that is high in vegetables, fruits, nuts, lean meats (pereferabley fish). Stay away from processed foods. Try and do sustained cardiovascular exercise for 30 minutes most days of the week.      Follow up in 1 year

## 2020-12-15 ENCOUNTER — TELEPHONE (OUTPATIENT)
Dept: CARDIOLOGY CLINIC | Age: 65
End: 2020-12-15

## 2020-12-15 VITALS
DIASTOLIC BLOOD PRESSURE: 64 MMHG | HEIGHT: 75 IN | OXYGEN SATURATION: 97 % | HEART RATE: 91 BPM | WEIGHT: 253 LBS | SYSTOLIC BLOOD PRESSURE: 104 MMHG | BODY MASS INDEX: 31.46 KG/M2

## 2020-12-15 NOTE — TELEPHONE ENCOUNTER
Faxed new order from [facility] to [phone]. KATY and spoke with Sagar Baltazar, she reports finding new order in their system and will call the patient to schedule.

## 2020-12-15 NOTE — TELEPHONE ENCOUNTER
As stated in telephone encounter from this AM, pt needs Pharmacological stress echocardiogram. Please let them know. Thank you.

## 2020-12-15 NOTE — TELEPHONE ENCOUNTER
[facility] Sl[facility] is needing clarification on testing order whether it is nuclear or just an echo stress. Please fax order to [phone].  Any questions please call

## 2020-12-16 ENCOUNTER — TELEPHONE (OUTPATIENT)
Dept: CARDIOLOGY CLINIC | Age: 65
End: 2020-12-16

## 2020-12-22 ENCOUNTER — TELEPHONE (OUTPATIENT)
Dept: INTERNAL MEDICINE CLINIC | Age: 65
End: 2020-12-22

## 2021-01-11 RX ORDER — ROSUVASTATIN CALCIUM 10 MG/1
TABLET, COATED ORAL
Qty: 90 TABLET | Refills: 3 | Status: SHIPPED | OUTPATIENT
Start: 2021-01-11 | End: 2021-05-13

## 2021-01-11 RX ORDER — OMEPRAZOLE 20 MG/1
CAPSULE, DELAYED RELEASE ORAL
Qty: 90 CAPSULE | Refills: 3 | Status: SHIPPED | OUTPATIENT
Start: 2021-01-11 | End: 2021-10-21 | Stop reason: ALTCHOICE

## 2021-01-19 LAB
AVERAGE GLUCOSE: NORMAL
CHOLESTEROL, TOTAL: 153 MG/DL
CHOLESTEROL/HDL RATIO: NORMAL
HBA1C MFR BLD: 8.6 %
HDLC SERPL-MCNC: 39 MG/DL (ref 35–70)
LDL CHOLESTEROL CALCULATED: 80 MG/DL (ref 0–160)
NONHDLC SERPL-MCNC: NORMAL MG/DL
TRIGL SERPL-MCNC: 168 MG/DL
VLDLC SERPL CALC-MCNC: 34 MG/DL

## 2021-01-21 ENCOUNTER — TELEPHONE (OUTPATIENT)
Dept: CARDIOLOGY CLINIC | Age: 66
End: 2021-01-21

## 2021-01-21 NOTE — TELEPHONE ENCOUNTER
Spoke with patient at length about BP. Per DCE, he is not concerned with the diastolic being high due to it being a wrist cuff. I advised patient to monitor his BP the next week and to call if his systolic is consistently greater than 140. The patient also said he has an appt with his pcp on Monday. I advised him to bring in his bp cuff to the appt to have them check its accuracy. I also told him to bring in a list of his BP numbers. Patient expressed understanding of all instructions and had no other questions.

## 2021-01-21 NOTE — TELEPHONE ENCOUNTER
His b/p 136/108 before meds this morning . He states he usually takes his night time dose at about 10pm and he then checks his b/p a little later and it is always high .  Asking to speak to DCE nurse to see what he needs to do about his increased b/p

## 2021-01-25 ENCOUNTER — OFFICE VISIT (OUTPATIENT)
Dept: INTERNAL MEDICINE CLINIC | Age: 66
End: 2021-01-25
Payer: MEDICARE

## 2021-01-25 DIAGNOSIS — E11.9 TYPE 2 DIABETES MELLITUS WITHOUT COMPLICATION, WITHOUT LONG-TERM CURRENT USE OF INSULIN (HCC): ICD-10-CM

## 2021-01-25 DIAGNOSIS — Z72.0 TOBACCO USE: ICD-10-CM

## 2021-01-25 DIAGNOSIS — E78.00 HYPERCHOLESTEREMIA: ICD-10-CM

## 2021-01-25 DIAGNOSIS — I10 ESSENTIAL HYPERTENSION: ICD-10-CM

## 2021-01-25 DIAGNOSIS — Z12.5 PROSTATE CANCER SCREENING: ICD-10-CM

## 2021-01-25 DIAGNOSIS — Z86.010 HISTORY OF COLON POLYPS: ICD-10-CM

## 2021-01-25 DIAGNOSIS — I25.10 CORONARY ARTERY DISEASE INVOLVING NATIVE CORONARY ARTERY OF NATIVE HEART WITHOUT ANGINA PECTORIS: Primary | ICD-10-CM

## 2021-01-25 DIAGNOSIS — E78.5 HYPERLIPIDEMIA LDL GOAL <70: ICD-10-CM

## 2021-01-25 DIAGNOSIS — N52.9 ERECTILE DYSFUNCTION, UNSPECIFIED ERECTILE DYSFUNCTION TYPE: ICD-10-CM

## 2021-01-25 DIAGNOSIS — H46.9 OPTIC NEURITIS: ICD-10-CM

## 2021-01-25 DIAGNOSIS — Z28.09 VACCINE CONTRAINDICATED: ICD-10-CM

## 2021-01-25 PROCEDURE — G8484 FLU IMMUNIZE NO ADMIN: HCPCS | Performed by: INTERNAL MEDICINE

## 2021-01-25 PROCEDURE — 99214 OFFICE O/P EST MOD 30 MIN: CPT | Performed by: INTERNAL MEDICINE

## 2021-01-25 PROCEDURE — 4040F PNEUMOC VAC/ADMIN/RCVD: CPT | Performed by: INTERNAL MEDICINE

## 2021-01-25 PROCEDURE — 2022F DILAT RTA XM EVC RTNOPTHY: CPT | Performed by: INTERNAL MEDICINE

## 2021-01-25 PROCEDURE — G8427 DOCREV CUR MEDS BY ELIG CLIN: HCPCS | Performed by: INTERNAL MEDICINE

## 2021-01-25 PROCEDURE — 1036F TOBACCO NON-USER: CPT | Performed by: INTERNAL MEDICINE

## 2021-01-25 PROCEDURE — 3046F HEMOGLOBIN A1C LEVEL >9.0%: CPT | Performed by: INTERNAL MEDICINE

## 2021-01-25 PROCEDURE — G8417 CALC BMI ABV UP PARAM F/U: HCPCS | Performed by: INTERNAL MEDICINE

## 2021-01-25 PROCEDURE — 1123F ACP DISCUSS/DSCN MKR DOCD: CPT | Performed by: INTERNAL MEDICINE

## 2021-01-25 PROCEDURE — 3017F COLORECTAL CA SCREEN DOC REV: CPT | Performed by: INTERNAL MEDICINE

## 2021-01-25 RX ORDER — LOSARTAN POTASSIUM 100 MG/1
100 TABLET ORAL DAILY
Qty: 90 TABLET | Refills: 3 | Status: SHIPPED | OUTPATIENT
Start: 2021-01-25 | End: 2022-01-03

## 2021-01-25 RX ORDER — DULAGLUTIDE 0.75 MG/.5ML
0.75 INJECTION, SOLUTION SUBCUTANEOUS WEEKLY
Qty: 4 PEN | Refills: 5 | Status: SHIPPED | OUTPATIENT
Start: 2021-01-25 | End: 2021-03-08

## 2021-01-25 RX ORDER — METFORMIN HYDROCHLORIDE 500 MG/1
1000 TABLET, EXTENDED RELEASE ORAL
Qty: 180 TABLET | Refills: 0 | Status: SHIPPED | OUTPATIENT
Start: 2021-01-25 | End: 2021-05-24

## 2021-01-25 ASSESSMENT — PATIENT HEALTH QUESTIONNAIRE - PHQ9
SUM OF ALL RESPONSES TO PHQ QUESTIONS 1-9: 0
SUM OF ALL RESPONSES TO PHQ QUESTIONS 1-9: 0
2. FEELING DOWN, DEPRESSED OR HOPELESS: 0
1. LITTLE INTEREST OR PLEASURE IN DOING THINGS: 0

## 2021-01-25 NOTE — PROGRESS NOTES
Patient: Elizabeth Rivera is a 76 y.o. male who presents today with the following Chief Complaint(s):  Chief Complaint   Patient presents with    Check-Up       HPI     Here today for follow up . Had labs done at [address] [facility]. Has not yet had sleep study. Does not want to do it as he does not think that he needs it. States that he does not snore. Generally feels well rested. Does not have morning HA. Optic neuritis primarily involves his left eye with some changes in his right eye as well. DM- sugars have been running high. Per patient, HbA1c at [facility] was 8.6%. fasting sugars are running in the low-mid-200's, after dinner sugars are ranging from 193-370, most are in the mid-200's. Did go off of his diet over the holidays but is trying to do better now and watch his carbs better. Has also gained about 20 pounds since July. Did quit smoking! Does not tolerate higher doses of metformin- causes GI upset. Tobacco use- Chantix helped him quit smoking and he quit in July 2020! Recently stopped Chantix and is doing well. HTN-blood pressure at home has been ranging from  123//101. Review of blood pressures from home appear to be mostly in the 120s-130s/70s-80s. Wife is concerned that his ears have wax impaction. Has ENT apt with Dr. Mecca Souza on 2/9. Does need to turn the TV up recently. Had colonoscopy in 11/2018 with 2 polyps.      Labs from [facility].: Hemoglobin A1c 8.6%  Total cholesterol 153/triglycerides 168/HDL 39/LDL 80  CMP: Potassium 4.8/glucose 200/BUN 25/creatinine 1.2/LFTs normal  Urine microalbumin 2420  TSH 0.812    Allergies   Allergen Reactions    Influenza Vaccines Rash    Lisinopril Rash      Past Medical History:   Diagnosis Date    CAD (coronary artery disease)     Hyperlipidemia     Hypertension     Type 2 diabetes mellitus without complication [facility])       Past Surgical History:   Procedure Laterality Date    FOOT SURGERY Right 11/05/2020    PTCA  2104 PTCA/Stent x 1 vessel. following MI      Social History     Socioeconomic History    Marital status: Single     Spouse name: Not on file    Number of children: Not on file    Years of education: Not on file    Highest education level: Not on file   Occupational History    Not on file   Social Needs    Financial resource strain: Not on file    Food insecurity     Worry: Not on file     Inability: Not on file    Transportation needs     Medical: Not on file     Non-medical: Not on file   Tobacco Use    Smoking status: Former Smoker     Packs/day: 1.00     Years: 40.00     Pack years: 40.00     Start date: 0     Quit date: 2020     Years since quittin.6    Smokeless tobacco: Never Used    Tobacco comment: trying to quit   Substance and Sexual Activity    Alcohol use:  Yes     Alcohol/week: 1.0 standard drinks     Types: 1 Shots of liquor per week     Frequency: Monthly or less     Drinks per session: 1 or 2     Comment: weekly or less    Drug use: Not on file    Sexual activity: Yes     Partners: Female   Lifestyle    Physical activity     Days per week: Not on file     Minutes per session: Not on file    Stress: Not on file   Relationships    Social connections     Talks on phone: Not on file     Gets together: Not on file     Attends Church service: Not on file     Active member of club or organization: Not on file     Attends meetings of clubs or organizations: Not on file     Relationship status: Not on file    Intimate partner violence     Fear of current or ex partner: Not on file     Emotionally abused: Not on file     Physically abused: Not on file     Forced sexual activity: Not on file   Other Topics Concern    Not on file   Social History Narrative    Not on file     Family History   Problem Relation Age of Onset    Breast Cancer Mother 52    Heart Attack Father 64    Hypertension Father     Cancer Sister 43    Drug Abuse Sister 62            Heart Attack Maternal Grandfather 52    Heart Attack Son 34        Outpatient Medications Prior to Visit   Medication Sig Dispense Refill    rosuvastatin (CRESTOR) 10 MG tablet TAKE 1 TABLET BY MOUTH EVERY DAY 90 tablet 3    omeprazole (PRILOSEC) 20 MG delayed release capsule TAKE 1 CAPSULE BY MOUTH EVERY DAY 90 capsule 3    ranolazine (RANEXA) 500 MG extended release tablet TAKE 1 TABLET BY MOUTH TWICE DAILY 180 tablet 3    Lancets MISC 1 each by Does not apply route daily 100 each 5    CHANTIX CONTINUING MONTH PARISA 1 MG tablet TAKE 1 TABLET BY MOUTH TWICE A  tablet 2    aspirin 81 MG chewable tablet Take 81 mg by mouth      carvedilol (COREG) 6.25 MG tablet 6.25 mg daily       clopidogrel (PLAVIX) 75 MG tablet TAKE 1 TABLET BY MOUTH DAILY.  metFORMIN (GLUCOPHAGE) 500 MG tablet TAKE 1 TABLET BY MOUTH TWICE DAILY WITH MEALS 180 tablet 0    losartan (COZAAR) 50 MG tablet Take 1 tablet by mouth daily 90 tablet 3     No facility-administered medications prior to visit. Patient'spast medical history, surgical history, family history, medications,  and allergies  were all reviewed and updated as appropriate today. Review of Systems   Constitutional: Negative for appetite change, fatigue, fever and unexpected weight change. Respiratory: Negative for chest tightness and shortness of breath. Cardiovascular: Negative for chest pain. Gastrointestinal: Negative for abdominal pain, constipation and diarrhea. Endocrine: Negative for cold intolerance, heat intolerance, polydipsia, polyphagia and polyuria. No low blood sugars   Skin: Negative for rash. There were no vitals taken for this visit. Physical Exam  Vitals signs and nursing note reviewed. Constitutional:       Appearance: He is well-developed. He is not toxic-appearing. HENT:      Head: Normocephalic.       Right Ear: Tympanic membrane, ear canal and external ear normal.      Left Ear: Tympanic membrane, ear canal and external ear normal.   Eyes:      General: No scleral icterus. Extraocular Movements: Extraocular movements intact. Conjunctiva/sclera: Conjunctivae normal.      Pupils: Pupils are equal, round, and reactive to light. Neck:      Thyroid: No thyroid mass or thyromegaly. Vascular: No carotid bruit. Cardiovascular:      Rate and Rhythm: Normal rate and regular rhythm. Pulses:           Dorsalis pedis pulses are 2+ on the right side and 2+ on the left side. Heart sounds: Normal heart sounds. No murmur. Comments: No LE edema  Pulmonary:      Effort: Pulmonary effort is normal.      Breath sounds: Normal breath sounds. Lymphadenopathy:      Cervical: No cervical adenopathy. Neurological:      General: No focal deficit present. Mental Status: He is alert and oriented to person, place, and time. Psychiatric:         Mood and Affect: Mood normal.         Behavior: Behavior normal. Behavior is cooperative. ASSESSMENT/PLAN:    Problem List Items Addressed This Visit     Coronary artery disease involving native coronary artery of native heart without angina pectoris - Primary     Asymptomatic. Follows with Dr. Jase Rhodes. Relevant Medications    losartan (COZAAR) 100 MG tablet    Other Relevant Orders    Claudette Ham MD, Sleep Medicine, [facility]    Essential hypertension     Continue losartan 100 mg daily and carvedilol 6.25 mg daily. He does have proteinuria which will need to be followed and he may need to see nephrology. Relevant Orders    Richel[facility] Augustine [facility], [facility]    Comprehensive Metabolic Panel    Hyperlipidemia LDL goal <70     LDL is 80. Continue Crestor 10 mg daily. Relevant Medications    losartan (COZAAR) 100 MG tablet    Type 2 diabetes mellitus without complication, without long-term current use of insulin ([facility])     Patient sugars have been running in the low to mid 200s.   He has not been really watching his diet and has gained weight since quitting smoking in July 2020. Continue Metformin 500 mg twice daily (does not tolerate higher doses) but will change to Metformin ER which may help with his tolerability issues. Will add Trulicity 2.64 mg weekly. Encouraged patient to do better with his diet. Relevant Medications    metFORMIN (GLUCOPHAGE-XR) 500 MG extended release tablet    Dulaglutide (TRULICITY) 3.07 SV/0.7MN SOPN    Other Relevant Orders    Comprehensive Metabolic Panel    Hemoglobin A1C    Vaccine contraindicated     Had allergic reaction to flu vaccine with facial swelling and rash. Optic neuritis     Follows with Dr. Neida Brooks. Dr. Neida Brooks has requested carotid Dopplers. Unfortunately, he does not have a diagnosis with Medicare that supports carotid Dopplers. I have advised patient to undergo vascular screening at [facility] which will be much less expensive than paying out-of-pocket for carotid Dopplers that are not covered by Medicare. The screen will include carotid Dopplers. Also discussed with patient that Dr. Neida Brooks would like for him to have a sleep study as there is an association of optic neuritis with obstructive sleep apnea. Patient is reluctant to undergo sleep evaluation until we discussed the other aspects of obstructive sleep apnea that may affect his heart and he is now agreeable. Referral again placed to Dr. Hilary Walker for sleep medicine. Diabetes and cholesterol have typically been well controlled until recent hemoglobin A1c of 8.6%. Relevant Orders    Alvaro Gupta MD, Sleep Medicine, [facility]    Esequiel Sarabia MD, [facility], [facility]    Erectile dysfunction     Patient is not eligible to take medication such as Viagra or other PD 5 inhibitors due to his optic neuritis. His wife is wondering if he would benefit from a vacuum pump. Referral placed to urology to discuss treatment options.          Relevant Orders AFL - Kylee Wayne MD, [facility], [facility]      Other Visit Diagnoses     Prostate cancer screening        Relevant Orders    Psa screening          Current Outpatient Medications   Medication Sig Dispense Refill    metFORMIN (GLUCOPHAGE-XR) 500 MG extended release tablet Take 2 tablets by mouth daily (with breakfast) 180 tablet 0    Dulaglutide (TRULICITY) 9.40 AF/8.4DK SOPN Inject 0.75 mg into the skin once a week 4 pen 5    losartan (COZAAR) 100 MG tablet Take 1 tablet by mouth daily 90 tablet 3    rosuvastatin (CRESTOR) 10 MG tablet TAKE 1 TABLET BY MOUTH EVERY DAY 90 tablet 3    omeprazole (PRILOSEC) 20 MG delayed release capsule TAKE 1 CAPSULE BY MOUTH EVERY DAY 90 capsule 3    ranolazine (RANEXA) 500 MG extended release tablet TAKE 1 TABLET BY MOUTH TWICE DAILY 180 tablet 3    Lancets MISC 1 each by Does not apply route daily 100 each 5    CHANTIX CONTINUING MONTH PARISA 1 MG tablet TAKE 1 TABLET BY MOUTH TWICE A  tablet 2    aspirin 81 MG chewable tablet Take 81 mg by mouth      carvedilol (COREG) 6.25 MG tablet 6.25 mg daily       clopidogrel (PLAVIX) 75 MG tablet TAKE 1 TABLET BY MOUTH DAILY. No current facility-administered medications for this visit. Return in about 3 months (around 4/25/2021).

## 2021-01-25 NOTE — PATIENT INSTRUCTIONS
Centrum Silver or similar would be fine for a multivitamin. Increase losartan to 100 mg daily. Take 2 of the 50 mg pills to use them up. Change metformin to Metformin ER once you run out of your current supply of pills. Will still be 1 pill twice daily but may be better tolerated and we may be able to increase this to 3 pills daily if needed. Add Trulicity weekly. Dr. Lester Arshad says no ED medication due to optic neuritis. No wax in ears. Likely has age-related hearing loss and needs to have hearing tested. Dr. Lester Arshad wants you to have carotid Dopplers done as part of the work up for your optic neuritis. Unfortunately, Medicare will not cover carotid Dopplers with this diagnosis and fortunately, you do not have exam findings that would require this test. As a work around, I recommend having a vascular screening done. Please schedule a Vascular Screening at [facility] for 30$ and that will check for blockages in your carotid arteries, check your abdominal aorta for an aneurysm,  and check the blood flow in your  legs. You do not need an order to do this. Please  call [phone] to schedule.

## 2021-01-28 PROBLEM — N52.9 ERECTILE DYSFUNCTION: Status: ACTIVE | Noted: 2021-01-28

## 2021-01-28 PROBLEM — H46.9 OPTIC NEURITIS: Status: ACTIVE | Noted: 2021-01-28

## 2021-01-28 ASSESSMENT — ENCOUNTER SYMPTOMS
SHORTNESS OF BREATH: 0
CHEST TIGHTNESS: 0
ABDOMINAL PAIN: 0
DIARRHEA: 0
CONSTIPATION: 0

## 2021-01-28 NOTE — ASSESSMENT & PLAN NOTE
Continue losartan 100 mg daily and carvedilol 6.25 mg daily. He does have proteinuria which will need to be followed and he may need to see nephrology.

## 2021-01-28 NOTE — ASSESSMENT & PLAN NOTE
Patient is not eligible to take medication such as Viagra or other PD 5 inhibitors due to his optic neuritis. His wife is wondering if he would benefit from a vacuum pump. Referral placed to urology to discuss treatment options.

## 2021-01-28 NOTE — ASSESSMENT & PLAN NOTE
Most recent colonoscopy was in November 2018. He had 2 polyps. Will be due for repeat colonoscopy in 2023.

## 2021-01-28 NOTE — ASSESSMENT & PLAN NOTE
Patient sugars have been running in the low to mid 200s. He has not been really watching his diet and has gained weight since quitting smoking in July 2020. Continue Metformin 500 mg twice daily (does not tolerate higher doses) but will change to Metformin ER which may help with his tolerability issues. Will add Trulicity 9.25 mg weekly. Encouraged patient to do better with his diet.

## 2021-01-28 NOTE — ASSESSMENT & PLAN NOTE
Patient quit smoking in July 2020 with the help of Chantix. He recently discontinued his Chantix and continues to have no desire to smoke.

## 2021-01-28 NOTE — ASSESSMENT & PLAN NOTE
Follows with Dr. Joanne Warner. Dr. Joanne Warner has requested carotid Dopplers. Unfortunately, he does not have a diagnosis with Medicare that supports carotid Dopplers. I have advised patient to undergo vascular screening at [facility] which will be much less expensive than paying out-of-pocket for carotid Dopplers that are not covered by Medicare. The screen will include carotid Dopplers. Also discussed with patient that Dr. Joanen Warner would like for him to have a sleep study as there is an association of optic neuritis with obstructive sleep apnea. Patient is reluctant to undergo sleep evaluation until we discussed the other aspects of obstructive sleep apnea that may affect his heart and he is now agreeable. Referral again placed to Dr. Nina Oliver for sleep medicine. Diabetes and cholesterol have typically been well controlled until recent hemoglobin A1c of 8.6%.

## 2021-02-08 ENCOUNTER — TELEPHONE (OUTPATIENT)
Dept: PULMONOLOGY | Age: 66
End: 2021-02-08

## 2021-02-08 NOTE — TELEPHONE ENCOUNTER
Michelle from [address] [place] [address], she needs clinical documentation for sleep study.  Auth # [id number]    I LM w/Michelle, patients initial consultation with Dr Alejandra Carlisle is 3/8/21     [phone]  Fax [phone]

## 2021-02-14 ENCOUNTER — TELEPHONE (OUTPATIENT)
Dept: PULMONOLOGY | Age: 66
End: 2021-02-14

## 2021-02-14 NOTE — TELEPHONE ENCOUNTER
All from [facility] left message that patient was not apprioved due to lack of medical necessity, but did not say what he was not approved for. [facility]#[id number]. She stated she would send us the information.

## 2021-02-22 ENCOUNTER — TELEPHONE (OUTPATIENT)
Dept: INTERNAL MEDICINE CLINIC | Age: 66
End: 2021-02-22

## 2021-02-24 ENCOUNTER — TELEPHONE (OUTPATIENT)
Dept: INTERNAL MEDICINE CLINIC | Age: 66
End: 2021-02-24

## 2021-02-24 DIAGNOSIS — R53.83 FATIGUE, UNSPECIFIED TYPE: Primary | ICD-10-CM

## 2021-02-24 DIAGNOSIS — R06.83 SNORING: ICD-10-CM

## 2021-02-24 NOTE — TELEPHONE ENCOUNTER
Great. We can add fatigue and snoring to the referral. He denied these complaints in the office. We will not be the ones working with insurance to get the sleep study approved, that is for sleep medicine.  saw

## 2021-02-24 NOTE — TELEPHONE ENCOUNTER
Patients wife called to see where we are with the process of getting his sleep study approved. Patients wife states he does have extreme fatigue and he does snore some.

## 2021-03-08 ENCOUNTER — OFFICE VISIT (OUTPATIENT)
Dept: PULMONOLOGY | Age: 66
End: 2021-03-08
Payer: MEDICARE

## 2021-03-08 VITALS
HEART RATE: 77 BPM | WEIGHT: 250 LBS | DIASTOLIC BLOOD PRESSURE: 74 MMHG | HEIGHT: 75 IN | OXYGEN SATURATION: 95 % | BODY MASS INDEX: 31.08 KG/M2 | SYSTOLIC BLOOD PRESSURE: 117 MMHG

## 2021-03-08 DIAGNOSIS — G47.10 HYPERSOMNIA: Primary | ICD-10-CM

## 2021-03-08 DIAGNOSIS — E66.9 NON MORBID OBESITY, UNSPECIFIED OBESITY TYPE: Chronic | ICD-10-CM

## 2021-03-08 DIAGNOSIS — E11.9 TYPE 2 DIABETES MELLITUS WITHOUT COMPLICATION, WITHOUT LONG-TERM CURRENT USE OF INSULIN (HCC): Chronic | ICD-10-CM

## 2021-03-08 DIAGNOSIS — I25.10 CORONARY ARTERY DISEASE INVOLVING NATIVE CORONARY ARTERY OF NATIVE HEART WITHOUT ANGINA PECTORIS: Chronic | ICD-10-CM

## 2021-03-08 DIAGNOSIS — R06.83 SNORING: ICD-10-CM

## 2021-03-08 DIAGNOSIS — I10 ESSENTIAL HYPERTENSION: Chronic | ICD-10-CM

## 2021-03-08 PROBLEM — E78.5 HYPERLIPIDEMIA LDL GOAL <70: Chronic | Status: ACTIVE | Noted: 2019-12-17

## 2021-03-08 PROCEDURE — G8484 FLU IMMUNIZE NO ADMIN: HCPCS | Performed by: INTERNAL MEDICINE

## 2021-03-08 PROCEDURE — 99204 OFFICE O/P NEW MOD 45 MIN: CPT | Performed by: INTERNAL MEDICINE

## 2021-03-08 PROCEDURE — 2022F DILAT RTA XM EVC RTNOPTHY: CPT | Performed by: INTERNAL MEDICINE

## 2021-03-08 PROCEDURE — 3017F COLORECTAL CA SCREEN DOC REV: CPT | Performed by: INTERNAL MEDICINE

## 2021-03-08 PROCEDURE — 4040F PNEUMOC VAC/ADMIN/RCVD: CPT | Performed by: INTERNAL MEDICINE

## 2021-03-08 PROCEDURE — 3052F HG A1C>EQUAL 8.0%<EQUAL 9.0%: CPT | Performed by: INTERNAL MEDICINE

## 2021-03-08 PROCEDURE — 1036F TOBACCO NON-USER: CPT | Performed by: INTERNAL MEDICINE

## 2021-03-08 PROCEDURE — 1123F ACP DISCUSS/DSCN MKR DOCD: CPT | Performed by: INTERNAL MEDICINE

## 2021-03-08 PROCEDURE — G8427 DOCREV CUR MEDS BY ELIG CLIN: HCPCS | Performed by: INTERNAL MEDICINE

## 2021-03-08 PROCEDURE — G8417 CALC BMI ABV UP PARAM F/U: HCPCS | Performed by: INTERNAL MEDICINE

## 2021-03-08 RX ORDER — AMLODIPINE BESYLATE 2.5 MG/1
2.5 TABLET ORAL DAILY
COMMUNITY
End: 2021-10-18 | Stop reason: SDUPTHER

## 2021-03-08 ASSESSMENT — ENCOUNTER SYMPTOMS
ABDOMINAL DISTENTION: 0
SHORTNESS OF BREATH: 0
EYE PAIN: 0
ABDOMINAL PAIN: 0
VOMITING: 0
CHOKING: 0
CHEST TIGHTNESS: 0
APNEA: 0
RHINORRHEA: 0
NAUSEA: 0
PHOTOPHOBIA: 0
ALLERGIC/IMMUNOLOGIC NEGATIVE: 1

## 2021-03-08 ASSESSMENT — SLEEP AND FATIGUE QUESTIONNAIRES
HOW LIKELY ARE YOU TO NOD OFF OR FALL ASLEEP WHILE SITTING INACTIVE IN A PUBLIC PLACE: 0
HOW LIKELY ARE YOU TO NOD OFF OR FALL ASLEEP WHILE SITTING AND READING: 1
HOW LIKELY ARE YOU TO NOD OFF OR FALL ASLEEP IN A CAR, WHILE STOPPED FOR A FEW MINUTES IN TRAFFIC: 0
NECK CIRCUMFERENCE (INCHES): 18.5
HOW LIKELY ARE YOU TO NOD OFF OR FALL ASLEEP WHILE SITTING AND TALKING TO SOMEONE: 0

## 2021-03-08 NOTE — LETTER
[facility]  [id number] [facility]  [address] [address]  Phone: [phone]  Fax: [phone]      March 8, 2021       Patient: Miah Mason   MR Number: [id number]   YOB: 1955   Date of Visit: 3/8/2021     Thank you for allowing me to participate in the care of Miah Mason. Here is my assessment and plan. Also attached is a copy of his consult note:    ASSESSMENT:  Visit Diagnoses and Associated Orders     Hypersomnia   (New Problem)  -  Primary    needs work-up    Home Sleep Study (HST) [16671 Custom]   - Future Order         Snoring   (New Problem)      needs work-up    Home Sleep Study (HST) [87243 Custom]   - Future Order         Coronary artery disease involving native coronary artery of native heart without angina pectoris   (Stable)           Essential hypertension   (Stable)           Type 2 diabetes mellitus without complication, without long-term current use of insulin (HCC)   (Stable)           Non morbid obesity, unspecified obesity type   (Stable)           ORDERS WITHOUT AN ASSOCIATED DIAGNOSIS    amLODIPine (NORVASC) 2.5 MG tablet [9771]            Plan:  One or more undiagnosed new problem with uncertain prognosis till final diagnosis is made. Differential diagnosis includes but not limited to: MARCELLUS, PLMD's, narcolepsy, parasomnias. Reviewed MARCELLUS (highest likelihood Dx): pathophysiology, diagnosis, complications and treatment. Instructed him not to drive if drowsy. Continue medications per her PCP and other physicians. Limit caffeine use after 3pm. Standard of care is to do in-lab PSG but insurance is mandating an inferior HST. 1 wk follow up after study to review his results. The chronic medical conditions listed are directly related to the primary diagnosis listed above. The management of the primary diagnosis affects the secondary diagnosis and vice versa.     This information was analyzed to assess complexity and medical decision making in regards to further testing and management. Continue meds for: HTN, CAD, and DM. Pt would medically benefit from wt loss for MARCELLUS (diet, exercise, surgical). Orders Placed This Encounter   Procedures    Home Sleep Study (HST)         If you have questions or concerns, please do not hesitate to call me. I look forward to following Marsha Canela along with you.     Sincerely,      Gale Castelan MD    CC providers:  Stephanie Garcia DO  [address] [address] [facility]  [address] [place]

## 2021-03-08 NOTE — PROGRESS NOTES
Shweta Norton MD, Emma Harvey, [facility]  Tiffanie Kehrt CNP  [place]  CNP [address]  [place] [address], [address] [facility]- [phone]   [facility] [address] Florian Montano. [address] [facility]. [address] [phone]     [address] [place] [address]  [address]  [address]  Dept: [phone]  Loc: [phone]    Assessment:      Visit Diagnoses and Associated Orders     Hypersomnia   (New Problem)  -  Primary    needs work-up    Home Sleep Study (HST) [23767 Custom]   - Future Order         Snoring   (New Problem)      needs work-up    Home Sleep Study (HST) [24895 Custom]   - Future Order         Coronary artery disease involving native coronary artery of native heart without angina pectoris   (Stable)           Essential hypertension   (Stable)           Type 2 diabetes mellitus without complication, without long-term current use of insulin (HCC)   (Stable)           Non morbid obesity, unspecified obesity type   (Stable)           ORDERS WITHOUT AN ASSOCIATED DIAGNOSIS    amLODIPine (NORVASC) 2.5 MG tablet [2172]        /     Plan:      One or more undiagnosed new problem with uncertain prognosis till final diagnosis is made. Differential diagnosis includes but not limited to: MARCELLUS, PLMD's, narcolepsy, parasomnias. Reviewed MARCELLUS (highest likelihood Dx): pathophysiology, diagnosis, complications and treatment. Instructed him not to drive if drowsy. Continue medications per her PCP and other physicians. Limit caffeine use after 3pm. Standard of care is to do in-lab PSG but insurance is mandating an inferior HST. 1 wk follow up after study to review his results. The chronic medical conditions listed are directly related to the primary diagnosis listed above. The management of the primary diagnosis affects the secondary diagnosis and vice versa. Reviewed referral note from Dr. Connie Vallejo on 1/25/21 showing possible sleep symptoms.  Reviewed note from Dr. Junior Hines on  Lancets MISC 1 each by Does not apply route daily (Patient not taking: Reported on 3/8/2021) 100 each 5     No current facility-administered medications for this visit. Allergies as of 2021 - Review Complete 2021   Allergen Reaction Noted    Influenza vaccines Rash 2020    Lisinopril Rash 2020       Patient Active Problem List   Diagnosis    Coronary artery disease involving native coronary artery of native heart without angina pectoris    Essential hypertension    Hyperlipidemia LDL goal <70    Palpitations    Bloody stool    History of colon polyps    Type 2 diabetes mellitus without complication, without long-term current use of insulin (HCC)    History of myocardial infarction    Tobacco use    Sudden visual loss of left eye    Weakness of both hips    Vaccine contraindicated    Optic neuritis    Erectile dysfunction       Past Medical History:   Diagnosis Date    CAD (coronary artery disease)     Hyperlipidemia     Hypertension     Type 2 diabetes mellitus without complication ([facility] [address]        Past Surgical History:   Procedure Laterality Date    FOOT SURGERY Right 2020    PTCA  210    PTCA/Stent x 1 vessel. following MI       Family History   Problem Relation Age of Onset    Breast Cancer Mother 52    Heart Attack Father 64    Hypertension Father     Cancer Sister 43    Drug Abuse Sister 62            Heart Attack Maternal Grandfather 52    Heart Attack Son 34       Review of Systems   Constitutional: Positive for fatigue. Negative for activity change and appetite change. HENT: Negative for congestion, nosebleeds, postnasal drip, rhinorrhea and sneezing. Eyes: Positive for visual disturbance. Negative for photophobia and pain. Respiratory: Negative for apnea, choking, chest tightness and shortness of breath. Cardiovascular: Negative. Gastrointestinal: Negative for abdominal distention, abdominal pain, nausea and vomiting. Endocrine: Negative for cold intolerance and heat intolerance. Genitourinary: Positive for frequency. Negative for difficulty urinating, dysuria and urgency. Musculoskeletal: Negative. Negative for neck pain and neck stiffness. Skin: Negative. Allergic/Immunologic: Negative. Neurological: Negative for tremors, seizures, syncope and weakness. Hematological: Negative for adenopathy. Does not bruise/bleed easily. Psychiatric/Behavioral: Positive for sleep disturbance. Negative for agitation, behavioral problems and confusion. Objective:     Vitals:  Weight BMI   Wt Readings from Last 3 Encounters:   03/08/21 250 lb (113.4 kg)   12/14/20 253 lb (114.8 kg)   09/01/20 235 lb 12.8 oz (107 kg)    Body mass index is 31.25 kg/m². BP HR SaO2   BP Readings from Last 3 Encounters:   03/08/21 117/74   12/14/20 104/64   10/29/20 118/76    Pulse Readings from Last 3 Encounters:   03/08/21 77   12/14/20 91   10/29/20 75    SpO2 Readings from Last 3 Encounters:   03/08/21 95%   12/14/20 97%   09/01/20 99%        Physical Exam  Vitals signs reviewed. Constitutional:       General: He is not in acute distress. Appearance: Normal appearance. He is well-developed. He is not toxic-appearing or diaphoretic. HENT:      Head: Normocephalic and atraumatic. Not macrocephalic and not microcephalic. Right Ear: External ear normal.      Left Ear: External ear normal.      Nose: Nasal deformity, septal deviation and mucosal edema present. Mouth/Throat:      Lips: Pink. Mouth: Mucous membranes are moist.      Tongue: No lesions. Palate: No mass. Pharynx: Uvula midline. No oropharyngeal exudate or uvula swelling. Tonsils: No tonsillar exudate or tonsillar abscesses. Comments: Tonsils: normal size  Eyes:      General: Lids are normal.      Extraocular Movements: Extraocular movements intact.       Conjunctiva/sclera: Conjunctivae normal.      Pupils: Pupils are equal, round, and reactive to light. Neck:      Musculoskeletal: Normal range of motion. Vascular: No JVD. Trachea: Trachea normal.      Comments: Neck Circ: 18.5 inches    Cardiovascular:      Rate and Rhythm: Normal rate and regular rhythm. Heart sounds: Normal heart sounds. Pulmonary:      Effort: Pulmonary effort is normal.      Breath sounds: Normal breath sounds. Abdominal:      General: Bowel sounds are normal.   Musculoskeletal:      Comments: No evidence of cyanosis or clubbing of nails   Skin:     General: Skin is warm. Nails: There is no clubbing. Neurological:      General: No focal deficit present. Mental Status: He is alert. Psychiatric:         Attention and Perception: Attention normal.         Mood and Affect: Mood and affect normal.         Speech: Speech normal.         Behavior: Behavior normal.         Thought Content:  Thought content normal.         Electronically signed by Roberth Simental MD on3/8/2021 at 12:44 PM

## 2021-03-09 ENCOUNTER — TELEPHONE (OUTPATIENT)
Dept: SLEEP CENTER | Age: 66
End: 2021-03-09

## 2021-03-09 ENCOUNTER — TELEPHONE (OUTPATIENT)
Dept: INTERNAL MEDICINE CLINIC | Age: 66
End: 2021-03-09

## 2021-03-09 NOTE — TELEPHONE ENCOUNTER
Called to schedule hst per Makenzie Ferrell. Left vm for the pt to return my call.    [facility]

## 2021-03-09 NOTE — TELEPHONE ENCOUNTER
Pt's face and hand get swollen and gets a slight rash. Per Dr Mark Rivera it is okay to get COVID vaccine. Pt was told to make sure he tells them his reaction to the flu vaccine in case they want to keep him there longer.

## 2021-03-09 NOTE — TELEPHONE ENCOUNTER
Wife calling. Pt and wife are both scheduled for the Moderna vaccine next week. Pt wife would like to know if it is ok for him to get the vaccine since he was not supposed to get the flu vaccine. Also, will there be any interaction with any of his medications, particularly Plavix?

## 2021-03-24 ENCOUNTER — HOSPITAL ENCOUNTER (OUTPATIENT)
Dept: SLEEP CENTER | Age: 66
Discharge: HOME | End: 2021-03-24
Payer: MEDICARE

## 2021-03-24 DIAGNOSIS — G47.10 HYPERSOMNIA: ICD-10-CM

## 2021-03-24 DIAGNOSIS — R06.83 SNORING: ICD-10-CM

## 2021-03-24 PROCEDURE — 95806 SLEEP STUDY UNATT&RESP EFFT: CPT

## 2021-03-24 PROCEDURE — 95806 SLEEP STUDY UNATT&RESP EFFT: CPT | Performed by: INTERNAL MEDICINE

## 2021-03-29 ENCOUNTER — TELEPHONE (OUTPATIENT)
Dept: PULMONOLOGY | Age: 66
End: 2021-03-29

## 2021-04-01 NOTE — TELEPHONE ENCOUNTER
Patient advocate called and asked if patient's order for machine could be sent to Richard Mercado at [phone].

## 2021-04-01 NOTE — PROGRESS NOTES
Porsche Dickens         : 1955  [place] [phone]    Diagnosis: [x] MARCELLUS (G47.33) [] CSA (G47.31) [] Apnea (G47.30)   Length of Need: [x] 12 Months [] 99 Months [] Other:    Machine (JALEN!): [x] Respironics Dream Station      Auto [] ResMed AirSense     Auto [] Other:     [x]  CPAP () [] Bilevel ()   Mode: [x] Auto [] Spontaneous    Mode: [] Auto [] Spontaneous          P min 6 cmH2O  P max 16 cmH2O                 Comfort Settings:   - Ramp Pressure: 4 cmH2O                                        - Ramp time: 15 min                                     -  Flex/EPR - 3 full time                                    - For ResMed Bilevel (TiMax-4 sec   TiMin- 0.2 sec)     Humidifier: [x] Heated ()        [x] Water chamber replacement ()/ 1 per 6 months        Mask:   [x] Nasal () /1 per 3 months [] Full Face () /1 per 3 months   [x] Patient choice -Size and fit mask [] Patient Choice - Size and fit mask   [] Dispense:  [] Dispense:    [x] Headgear () / 1 per 3 months [] Headgear () / 1 per 3 months   [x] Replacement Nasal Cushion ()/2 per month [] Interface Replacement ()/1 per month   [] Replacement Nasal Pillows ()/2 per month         Tubing: [x] Heated ()/1 per 3 months    [] Standard ()/1 per 3 months [] Other:           Filters: [x] Non-disposable ()/1 per 6 months     [x] Ultra-Fine, Disposable ()/2 per month        Miscellaneous: [] Chin Strap ()/ 1 per 6 months [] O2 bleed-in:       LPM   [] Oximetry on CPAP/Bilevel []  Other:    [x] Modem: ()         Start Order Date: 21    MEDICAL JUSTIFICATION:  I, the undersigned, certify that the above prescribed supplies are medically necessary for this patients wellbeing. In my opinion, the supplies are both reasonable and necessary in reference to accepted standards of medicalpractice in treatment of this patients condition.     Izabella Mckeon MD      NPI: [id number] Order Signed Date: 21    Electronically signed by Bassam Garcia MD on 2021 at 5:31 PM    Alexx Macedo  1955  [address]  [phone] (home)   [phone] (mobile)      Insurance Info (confirm with patient if correct):  Payor/Plan Subscr  Sex Relation Sub.  Ins. ID Effective Group Num

## 2021-04-13 ENCOUNTER — TELEPHONE (OUTPATIENT)
Dept: PULMONOLOGY | Age: 66
End: 2021-04-13

## 2021-04-13 NOTE — TELEPHONE ENCOUNTER
Duran Farnsworth from [facility] called, patients wife called [facility] to have them fill the order, so they have the same DME Co. Patients wife informed Duran Farnsworth, that order had be sent to [facility] had requested credit card information, patient gave them the cc information. Duran Farnsworth asked me to call [facility] to cancel the order, I spoke with Eduard @ [employer] [facility] and she sees no order in the system for this patient. I LMVM with patient regarding this.

## 2021-04-19 ENCOUNTER — TELEPHONE (OUTPATIENT)
Dept: INTERNAL MEDICINE CLINIC | Age: 66
End: 2021-04-19

## 2021-04-19 LAB
AVERAGE GLUCOSE: NORMAL
CHOLESTEROL, TOTAL: 160 MG/DL
CHOLESTEROL/HDL RATIO: NORMAL
CREATININE: 1.1 MG/DL
HBA1C MFR BLD: 6.9 %
HDLC SERPL-MCNC: 43 MG/DL (ref 35–70)
LDL CHOLESTEROL CALCULATED: 79 MG/DL (ref 0–160)
NONHDLC SERPL-MCNC: NORMAL MG/DL
POTASSIUM (K+): 4.8
TRIGL SERPL-MCNC: 189 MG/DL
VLDLC SERPL CALC-MCNC: NORMAL MG/DL

## 2021-04-19 NOTE — TELEPHONE ENCOUNTER
Need to capture AWV from insurance. May move appointments to May to have appt with his wife at the same time.  saw

## 2021-04-19 NOTE — TELEPHONE ENCOUNTER
Called patient to schedule AWV. Left message for patient to call back and schedule. Please schedule patient when he calls back.

## 2021-04-19 NOTE — TELEPHONE ENCOUNTER
Spoke with patient's wife. She says he had a AWV at home through insurance 2 months ago. She asked what time his appointment was with Dr. Ines Shelton on April 27. Patient is not on the schedule for that day. Wife says he made it when he let his last appointment. Not sure what happened. Dr. Ines Shelton has 2 Same day appointments on Tuesday, May 4th. An 11:40 and a 3:20. Would Dr. Ines Shelton ok to use one? If approved, could you call wife and let her know? Or let me know and I will call him. Thank you.

## 2021-04-20 RX ORDER — BLOOD SUGAR DIAGNOSTIC
STRIP MISCELLANEOUS
Qty: 50 STRIP | Refills: 3 | Status: SHIPPED | OUTPATIENT
Start: 2021-04-20 | End: 2021-06-22

## 2021-04-21 ENCOUNTER — TELEPHONE (OUTPATIENT)
Dept: INTERNAL MEDICINE CLINIC | Age: 66
End: 2021-04-21

## 2021-04-21 NOTE — TELEPHONE ENCOUNTER
Spoke with patient. Scheduled f/u appointment with Dr. Jayla Finnegan on May 18 at 4pm. Wife has appointment that day at 4:20. They will bring papers from insurance regarding AWV done at home.

## 2021-05-13 RX ORDER — ROSUVASTATIN CALCIUM 10 MG/1
TABLET, COATED ORAL
Qty: 30 TABLET | Refills: 5 | Status: SHIPPED | OUTPATIENT
Start: 2021-05-13 | End: 2021-05-18 | Stop reason: SDUPTHER

## 2021-05-13 RX ORDER — VARENICLINE TARTRATE 1 MG/1
TABLET, FILM COATED ORAL
Qty: 168 TABLET | Refills: 2 | Status: SHIPPED | OUTPATIENT
Start: 2021-05-13 | End: 2021-05-18

## 2021-05-18 ENCOUNTER — OFFICE VISIT (OUTPATIENT)
Dept: PULMONOLOGY | Age: 66
End: 2021-05-18
Payer: MEDICARE

## 2021-05-18 ENCOUNTER — OFFICE VISIT (OUTPATIENT)
Dept: INTERNAL MEDICINE CLINIC | Age: 66
End: 2021-05-18
Payer: MEDICARE

## 2021-05-18 VITALS
BODY MASS INDEX: 32.77 KG/M2 | OXYGEN SATURATION: 96 % | HEART RATE: 80 BPM | WEIGHT: 262.2 LBS | DIASTOLIC BLOOD PRESSURE: 70 MMHG | SYSTOLIC BLOOD PRESSURE: 110 MMHG

## 2021-05-18 VITALS
DIASTOLIC BLOOD PRESSURE: 74 MMHG | BODY MASS INDEX: 32.45 KG/M2 | WEIGHT: 261 LBS | HEIGHT: 75 IN | OXYGEN SATURATION: 98 % | SYSTOLIC BLOOD PRESSURE: 100 MMHG | HEART RATE: 74 BPM

## 2021-05-18 DIAGNOSIS — E11.9 TYPE 2 DIABETES MELLITUS WITHOUT COMPLICATION, WITHOUT LONG-TERM CURRENT USE OF INSULIN (HCC): ICD-10-CM

## 2021-05-18 DIAGNOSIS — G47.33 OBSTRUCTIVE SLEEP APNEA SYNDROME: Primary | ICD-10-CM

## 2021-05-18 DIAGNOSIS — E11.9 TYPE 2 DIABETES MELLITUS WITHOUT COMPLICATION, WITHOUT LONG-TERM CURRENT USE OF INSULIN (HCC): Chronic | ICD-10-CM

## 2021-05-18 DIAGNOSIS — G47.33 OBSTRUCTIVE SLEEP APNEA SYNDROME: ICD-10-CM

## 2021-05-18 DIAGNOSIS — E78.5 HYPERLIPIDEMIA LDL GOAL <70: ICD-10-CM

## 2021-05-18 DIAGNOSIS — I25.10 CORONARY ARTERY DISEASE INVOLVING NATIVE CORONARY ARTERY OF NATIVE HEART WITHOUT ANGINA PECTORIS: Chronic | ICD-10-CM

## 2021-05-18 DIAGNOSIS — E66.9 NON MORBID OBESITY, UNSPECIFIED OBESITY TYPE: ICD-10-CM

## 2021-05-18 DIAGNOSIS — I10 ESSENTIAL HYPERTENSION: Chronic | ICD-10-CM

## 2021-05-18 DIAGNOSIS — I10 ESSENTIAL HYPERTENSION: Primary | ICD-10-CM

## 2021-05-18 PROCEDURE — G8427 DOCREV CUR MEDS BY ELIG CLIN: HCPCS | Performed by: INTERNAL MEDICINE

## 2021-05-18 PROCEDURE — 3052F HG A1C>EQUAL 8.0%<EQUAL 9.0%: CPT | Performed by: INTERNAL MEDICINE

## 2021-05-18 PROCEDURE — 2022F DILAT RTA XM EVC RTNOPTHY: CPT | Performed by: INTERNAL MEDICINE

## 2021-05-18 PROCEDURE — 99214 OFFICE O/P EST MOD 30 MIN: CPT | Performed by: INTERNAL MEDICINE

## 2021-05-18 PROCEDURE — 1036F TOBACCO NON-USER: CPT | Performed by: INTERNAL MEDICINE

## 2021-05-18 PROCEDURE — 3017F COLORECTAL CA SCREEN DOC REV: CPT | Performed by: INTERNAL MEDICINE

## 2021-05-18 PROCEDURE — G8417 CALC BMI ABV UP PARAM F/U: HCPCS | Performed by: INTERNAL MEDICINE

## 2021-05-18 PROCEDURE — 4040F PNEUMOC VAC/ADMIN/RCVD: CPT | Performed by: INTERNAL MEDICINE

## 2021-05-18 PROCEDURE — 1123F ACP DISCUSS/DSCN MKR DOCD: CPT | Performed by: INTERNAL MEDICINE

## 2021-05-18 RX ORDER — ROSUVASTATIN CALCIUM 20 MG/1
20 TABLET, COATED ORAL DAILY
Qty: 30 TABLET | Refills: 5 | Status: SHIPPED | OUTPATIENT
Start: 2021-05-18 | End: 2021-05-18

## 2021-05-18 RX ORDER — DULAGLUTIDE 1.5 MG/.5ML
1.5 INJECTION, SOLUTION SUBCUTANEOUS WEEKLY
Qty: 4 PEN | Refills: 5 | Status: SHIPPED | OUTPATIENT
Start: 2021-05-18 | End: 2021-12-14 | Stop reason: DRUGHIGH

## 2021-05-18 RX ORDER — ROSUVASTATIN CALCIUM 20 MG/1
20 TABLET, COATED ORAL DAILY
Qty: 90 TABLET | Refills: 3 | Status: SHIPPED | OUTPATIENT
Start: 2021-05-18 | End: 2022-03-31

## 2021-05-18 ASSESSMENT — SLEEP AND FATIGUE QUESTIONNAIRES
HOW LIKELY ARE YOU TO NOD OFF OR FALL ASLEEP WHILE LYING DOWN TO REST IN THE AFTERNOON WHEN CIRCUMSTANCES PERMIT: 1
HOW LIKELY ARE YOU TO NOD OFF OR FALL ASLEEP WHILE WATCHING TV: 1
HOW LIKELY ARE YOU TO NOD OFF OR FALL ASLEEP IN A CAR, WHILE STOPPED FOR A FEW MINUTES IN TRAFFIC: 0
HOW LIKELY ARE YOU TO NOD OFF OR FALL ASLEEP WHEN YOU ARE A PASSENGER IN A CAR FOR AN HOUR WITHOUT A BREAK: 0

## 2021-05-18 NOTE — PROGRESS NOTES
Patient: Halima Yao is a 78 y.o. male who presents today with the following Chief Complaint(s):  Chief Complaint   Patient presents with    Check-Up       HPI     Here today for follow up. DM- morning sugars are running in the 180-200 range, HS sugars are running around mid-200's. Eats dinner around 6 pm and goes to bed around 11 pm. Monitor is around 3year old or so. Is also taking Trulicity 2.87 mg weekly. Started in January. HbA1c was 6.9%. Was also dx with MARCELLUS- at AHI of 26. Did start on CPAP. Doing well with quitting smoking- quit over 1 year ago and is no longer taking Chantix. Is doing well not smoking! HLD- no issues with Crestor. HTN- no BP issues. BP averaging around 120/80, none over 140/90. None under 100. Labs reviewed from Luann Rocha. Please see scanned copies. Allergies   Allergen Reactions    Influenza Vaccines Rash    Lisinopril Rash      Past Medical History:   Diagnosis Date    CAD (coronary artery disease)     Hyperlipidemia     Hypertension     Obstructive sleep apnea syndrome 2021    Type 2 diabetes mellitus without complication [facility])       Past Surgical History:   Procedure Laterality Date    FOOT SURGERY Right 2020    PTCA  2104    PTCA/Stent x 1 vessel. following MI      Social History     Socioeconomic History    Marital status: Single     Spouse name: Not on file    Number of children: Not on file    Years of education: Not on file    Highest education level: Not on file   Occupational History    Not on file   Tobacco Use    Smoking status: Former Smoker     Packs/day: 1.00     Years: 40.00     Pack years: 40.00     Start date: 0     Quit date: 2020     Years since quittin.9    Smokeless tobacco: Never Used    Tobacco comment: trying to quit   Vaping Use    Vaping Use: Never used   Substance and Sexual Activity    Alcohol use:  Yes     Alcohol/week: 1.0 standard drinks     Types: 1 Shots of liquor per week (COREG) 6.25 MG tablet 6.25 mg daily       clopidogrel (PLAVIX) 75 MG tablet TAKE 1 TABLET BY MOUTH DAILY.  CHANTIX CONTINUING MONTH PARISA 1 MG tablet TAKE 1 TABLET BY MOUTH TWICE DAILY 168 tablet 2    rosuvastatin (CRESTOR) 10 MG tablet TAKE 1 TABLET BY MOUTH EVERY DAY AT NIGHT 30 tablet 5     No facility-administered medications prior to visit. Patient'spast medical history, surgical history, family history, medications,  and allergies  were all reviewed and updated as appropriate today. Review of Systems   Constitutional: Negative for appetite change, fatigue and fever. Respiratory: Negative for chest tightness and shortness of breath. Cardiovascular: Negative for chest pain. Gastrointestinal: Negative for constipation and diarrhea. Skin: Negative for rash. /70   Pulse 80   Wt 262 lb 3.2 oz (118.9 kg)   SpO2 96%   BMI 32.77 kg/m²   Physical Exam  Vitals and nursing note reviewed. Constitutional:       Appearance: He is well-developed. He is not toxic-appearing. HENT:      Head: Normocephalic. Right Ear: Tympanic membrane, ear canal and external ear normal.      Left Ear: Tympanic membrane, ear canal and external ear normal.      Nose: Nose normal.      Mouth/Throat:      Mouth: Mucous membranes are moist.      Pharynx: No oropharyngeal exudate or posterior oropharyngeal erythema. Neck:      Thyroid: No thyroid mass or thyromegaly. Vascular: No carotid bruit. Cardiovascular:      Rate and Rhythm: Normal rate and regular rhythm. Pulses:           Dorsalis pedis pulses are 2+ on the right side and 2+ on the left side. Heart sounds: Normal heart sounds. No murmur heard. Pulmonary:      Effort: Pulmonary effort is normal.      Breath sounds: Normal breath sounds. Musculoskeletal:      Right lower leg: No edema. Left lower leg: No edema. Right foot: Normal range of motion.  No deformity, bunion, Charcot foot, foot drop or prominent metatarsal heads. Left foot: Normal range of motion. No deformity, bunion, Charcot foot, foot drop or prominent metatarsal heads. Feet:      Right foot:      Protective Sensation: 10 sites tested. 10 sites sensed. Skin integrity: Skin integrity normal.      Toenail Condition: Right toenails are normal.      Left foot:      Protective Sensation: 10 sites tested. 10 sites sensed. Skin integrity: Skin integrity normal.      Toenail Condition: Left toenails are normal.   Lymphadenopathy:      Cervical: No cervical adenopathy. Neurological:      Mental Status: He is alert. Psychiatric:         Behavior: Behavior is cooperative. ASSESSMENT/PLAN:    Problem List Items Addressed This Visit     Essential hypertension - Primary (Chronic)     Continue losartan 100 mg daily and carvedilol 6.25 mg daily. Hyperlipidemia LDL goal <70 (Chronic)      Continue Crestor 20 mg daily. Relevant Orders    Lipid Panel    Comprehensive Metabolic Panel    Obstructive sleep apnea syndrome      Recently started on CPAP. Type 2 diabetes mellitus without complication, without long-term current use of insulin ([facility]) (Chronic)     Sugars are running above goal.  Increase Trulicity to 1.5 mg weekly. Continue Metformin ER 1000 mg daily.          Relevant Medications    Dulaglutide (TRULICITY) 1.5 ZG/5.7EB SOPN    Other Relevant Orders     DIABETES FOOT EXAM (Completed)    Microalbumin / Creatinine Urine Ratio    Hemoglobin A1C    Comprehensive Metabolic Panel          Current Outpatient Medications   Medication Sig Dispense Refill    Dulaglutide (TRULICITY) 1.5 EW/9.8HX SOPN Inject 1.5 mg into the skin once a week 4 pen 5    ONETOUCH ULTRA strip TEST TWICE DAILY 50 strip 3    amLODIPine (NORVASC) 2.5 MG tablet Take 2.5 mg by mouth daily      losartan (COZAAR) 100 MG tablet Take 1 tablet by mouth daily 90 tablet 3    omeprazole (PRILOSEC) 20 MG delayed release capsule TAKE 1 CAPSULE BY MOUTH EVERY DAY 90 capsule 3    Lancets MISC 1 each by Does not apply route daily 100 each 5    aspirin 81 MG chewable tablet Take 81 mg by mouth      carvedilol (COREG) 6.25 MG tablet 6.25 mg daily       clopidogrel (PLAVIX) 75 MG tablet TAKE 1 TABLET BY MOUTH DAILY.  metFORMIN (GLUCOPHAGE-XR) 500 MG extended release tablet TAKE 2 TABLETS BY MOUTH DAILY WITH BREAKFAST 180 tablet 0    rosuvastatin (CRESTOR) 20 MG tablet TAKE 1 TABLET BY MOUTH DAILY 90 tablet 3     No current facility-administered medications for this visit. Return in about 4 months (around 9/18/2021).

## 2021-05-18 NOTE — PROGRESS NOTES
Lynn Kumari         : 1955    Diagnosis: [x] MARCELLUS (G47.33) [] CSA (G47.31) [] Apnea (G47.30)   Length of Need: [x] 13 Months [] 99 Months [] Other:    Machine (JALEN!): [] Respironics Dream Station      Auto [] ResMed AirSense     Auto [] Other:     []  CPAP () [] Bilevel ()   Mode: [] Auto [] Spontaneous    Mode: [] Auto [] Spontaneous              Comfort Settings:        Humidifier: [] Heated ()        [x] Water chamber replacement ()/ 1 per 6 months        Mask:   [] Nasal () /1 per 3 months [x] Full Face () /1 per 3 months   [] Patient choice -Size and fit mask [x] Patient Choice - Size and fit mask   [] Dispense:  [] Dispense:    [] Headgear () / 1 per 3 months [x] Headgear () / 1 per 3 months   [] Replacement Nasal Cushion ()/2 per month [x] Interface Replacement ()/1 per month   [] Replacement Nasal Pillows ()/2 per month         Tubing: [x] Heated ()/1 per 3 months    [] Standard ()/1 per 3 months [] Other:           Filters: [x] Non-disposable ()/1 per 6 months     [x] Ultra-Fine, Disposable ()/2 per month        Miscellaneous: [x] Chin Strap ()/ 1 per 6 months [] O2 bleed-in:       LPM   [] Oximetry on CPAP/Bilevel []  Other:    [x] Modem: ()         Start Order Date: 21    MEDICAL JUSTIFICATION:  I, the undersigned, certify that the above prescribed supplies are medically necessary for this patients wellbeing. In my opinion, the supplies are both reasonable and necessary in reference to accepted standards of medicalpractice in treatment of this patients condition.     Devin Miles MD      NPI: [id number]       Order Signed Date: 21    Electronically signed by Devin Miles MD on 2021 at 3:33 PM    Lynn Kumari  1955  [address]   [phone] (home)   [phone] (mobile)      Insurance Info (confirm with patient if correct):  Payor/Plan Subscr  Sex

## 2021-05-18 NOTE — PROGRESS NOTES
Johanna Rodriguez MD, Ruby Hinds, [facility]  Tiffanie Kehrt CNP  Gurinder Daley CNP [address] [facility], [address] [facility] [phone]   [facility] [address]. [address] [facility]. [address] [phone]     [facility] [id number] [phone]      Assessment/Plan:     1. Obstructive sleep apnea syndrome  Assessment & Plan:  New Problem - On Tx. Reviewed sleep study and download compliance data with patient. Supplies and parts as needed for his machine. These are medically necessary. Limit caffeine use after 3pm.  Needs overnight oximetry on APAP (insurance mandated) even though standard of care is to do in-lab titration. Reviewed with patient had adjusted humidity, that not better then will change a full facemask to see if it controls his dryness. 2. Coronary artery disease involving native coronary artery of native heart without angina pectoris  Assessment & Plan:  Chronic- Stable. Discussed the importance of treating obstructive sleep apnea as part of the management of this disorder. Cont any meds per PCP and other physicians. 3. Essential hypertension  Assessment & Plan:  Chronic- Stable. Discussed the importance of treating obstructive sleep apnea as part of the management of this disorder. Cont any meds per PCP and other physicians. 4. Type 2 diabetes mellitus without complication, without long-term current use of insulin (HCC)  Assessment & Plan:  Chronic- Stable. Discussed the importance of treating obstructive sleep apnea as part of the management of this disorder. Cont any meds per PCP and other physicians. 5. Non morbid obesity, unspecified obesity type  Assessment & Plan:  Chronic-not stable:  Discussed importance of treating obstructive sleep apnea and getting sufficient sleep to assist with weight control. Encouraged him to work on weight loss through diet and exercise.  Recommended DASH or Mediterranean diets. Reviewed, analyzed, and documented physiologic data from patient's PAP machine. This information was analyzed to assess complexity and medical decision making in regards to further testing and management. The primary encounter diagnosis was Obstructive sleep apnea syndrome. Diagnoses of Coronary artery disease involving native coronary artery of native heart without angina pectoris, Essential hypertension, Type 2 diabetes mellitus without complication, without long-term current use of insulin ([address] and Non morbid obesity, unspecified obesity type were also pertinent to this visit. The chronic medical conditions listed are directly related to the primary diagnosis listed above. The management of the primary diagnosis affects the secondary diagnosis and vice versa. Subjective:     Patient ID: Sweetie Fournier is a 76 y.o. male. Chief Complaint   Patient presents with    Sleep Apnea       HPI:  Machine Modem/Download Info:  Compliance (hours/night): 9 hrs/night  % of nights >= 4 hrs: 96.9 %  Download AHI (/hour): 7.9 /HR  Average CPAP Pressure : 9.9 cmH2O APAP - Settings  Pressure Min: 6 cmH2O  Pressure Max: 16 cmH2O                 Comfort Settings  Humidity Level (0-8): 4  Flex/EPR (0-3): 3       Had insurance mandated HST on 3/24/21 which showed and RADHA-25.2/hr (using 4% rule) and low sat-74% with time below 88% of 426 min. Feel more rested, not napping like before. Sleeping better though the night. Having mouth dryness.     [facility]    [facility] - Total score: 2    Social History     Socioeconomic History    Marital status: Single     Spouse name: Not on file    Number of children: Not on file    Years of education: Not on file    Highest education level: Not on file   Occupational History    Not on file   Tobacco Use    Smoking status: Former Smoker     Packs/day: 1.00     Years: 40.00     Pack years: 40.00     Start date: 0     Quit date: 2020     Years since quittin.9    Smokeless tobacco: Never Used    Tobacco comment: trying to quit   Vaping Use    Vaping Use: Never used   Substance and Sexual Activity    Alcohol use: Yes     Alcohol/week: 1.0 standard drinks     Types: 1 Shots of liquor per week     Comment: weekly or less    Drug use: Not on file    Sexual activity: Yes     Partners: Female   Other Topics Concern    Not on file   Social History Narrative    Not on file     Social Determinants of Health     Financial Resource Strain:     Difficulty of Paying Living Expenses:    Food Insecurity:     Worried About Running Out of Food in the Last Year:     [address] in the Last Year:    Transportation Needs:     Lack of Transportation (Medical):      Lack of Transportation (Non-Medical):    Physical Activity:     Days of Exercise per Week:     Minutes of Exercise per Session:    Stress:     Feeling of Stress :    Social Connections:     Frequency of Communication with Friends and Family:     Frequency of Social Gatherings with Friends and Family:     Attends Islam Services:     Active Member of Clubs or Organizations:     Attends Club or Organization Meetings:     Marital Status:    Intimate Partner Violence:     Fear of Current or Ex-Partner:     Emotionally Abused:     Physically Abused:     Sexually Abused:        Current Outpatient Medications   Medication Sig Dispense Refill    CHANTIX CONTINUING MONTH PARISA 1 MG tablet TAKE 1 TABLET BY MOUTH TWICE DAILY 168 tablet 2    rosuvastatin (CRESTOR) 10 MG tablet TAKE 1 TABLET BY MOUTH EVERY DAY AT NIGHT 30 tablet 5    ONETOUCH ULTRA strip TEST TWICE DAILY 50 strip 3    amLODIPine (NORVASC) 2.5 MG tablet Take 2.5 mg by mouth daily      metFORMIN (GLUCOPHAGE-XR) 500 MG extended release tablet Take 2 tablets by mouth daily (with breakfast) 180 tablet 0    losartan (COZAAR) 100 MG tablet Take 1 tablet by mouth daily 90 tablet 3    omeprazole (PRILOSEC) 20 MG

## 2021-05-18 NOTE — PATIENT INSTRUCTIONS
Increase Crestor (rosuvastatin) to 20 mg daily- take 2 of the 10 mg pills to use up. I did send a prescription for a 20 mg pill to your pharmacy. Increase Trulicity to 1.5 mg weekly. Start this with your next refill.

## 2021-05-18 NOTE — ASSESSMENT & PLAN NOTE
New Problem - On Tx. Reviewed sleep study and download compliance data with patient. Supplies and parts as needed for his machine. These are medically necessary. Limit caffeine use after 3pm.  Needs overnight oximetry on APAP (insurance mandated) even though standard of care is to do in-lab titration. Reviewed with patient had adjusted humidity, that not better then will change a full facemask to see if it controls his dryness.

## 2021-05-18 NOTE — PROGRESS NOTES
Santos Edmonds         : 1955    Diagnosis: [x] Hypoxia (R09.02) [] CSA (G47.31) [] Apnea (G47.30)   Length of Need: [] 13 Months [] 99 Months [] Other:    Machine (JALEN!): [] Respironics Dream Station      Auto [] ResMed AirSense     Auto [] Other:     []  CPAP () [] Bilevel ()   Mode: [] Auto [] Spontaneous    Mode: [] Auto [] Spontaneous              Comfort Settings:        Humidifier: [] Heated ()        [] Water chamber replacement ()/ 1 per 6 months        Mask:   [] Nasal () /1 per 3 months [] Full Face () /1 per 3 months   [] Patient choice -Size and fit mask [] Patient Choice - Size and fit mask   [] Dispense:  [] Dispense:    [] Headgear () / 1 per 3 months [] Headgear () / 1 per 3 months   [] Replacement Nasal Cushion ()/2 per month [] Interface Replacement ()/1 per month   [] Replacement Nasal Pillows ()/2 per month         Tubing: [] Heated ()/1 per 3 months    [] Standard ()/1 per 3 months [] Other:           Filters: [] Non-disposable ()/1 per 6 months     [] Ultra-Fine, Disposable ()/2 per month        Miscellaneous: [] Chin Strap ()/ 1 per 6 months [] O2 bleed-in:       LPM   [x] Overnight Oximetry on CPAP/Bilevel []  Other:    [] Modem: ()         Start Order Date: 21    MEDICAL JUSTIFICATION:  I, the undersigned, certify that the above prescribed supplies are medically necessary for this patients wellbeing. In my opinion, the supplies are both reasonable and necessary in reference to accepted standards of medicalpractice in treatment of this patients condition.     Lolis Hyde MD      NPI: [id number]       Order Signed Date: 21    Electronically signed by Lolis Hyde MD on 2021 at 4:16 PM    Santos Edmonds  1955  [address]   [phone] (home)   [phone] (mobile)      Insurance Info (confirm with patient if correct):  Payor/Plan Subscr  Sex Relation Sub.  Ins. ID Effective Group Num

## 2021-05-24 RX ORDER — METFORMIN HYDROCHLORIDE 500 MG/1
TABLET, EXTENDED RELEASE ORAL
Qty: 180 TABLET | Refills: 0 | Status: SHIPPED | OUTPATIENT
Start: 2021-05-24 | End: 2021-09-15 | Stop reason: SDUPTHER

## 2021-05-29 ASSESSMENT — ENCOUNTER SYMPTOMS
SHORTNESS OF BREATH: 0
CHEST TIGHTNESS: 0
DIARRHEA: 0
CONSTIPATION: 0

## 2021-05-30 NOTE — ASSESSMENT & PLAN NOTE
Sugars are running above goal.  Increase Trulicity to 1.5 mg weekly. Continue Metformin ER 1000 mg daily.

## 2021-06-17 ENCOUNTER — TELEPHONE (OUTPATIENT)
Dept: PULMONOLOGY | Age: 66
End: 2021-06-17

## 2021-06-18 NOTE — PROGRESS NOTES
[address]  1955  [address]   [phone] (home)   [phone] (mobile)      Insurance Info (confirm with patient if correct):  Payor/Plan Subscr  Sex Relation Sub.  Ins. ID Effective Group Num

## 2021-06-18 NOTE — TELEPHONE ENCOUNTER
Oximetry abnormal and still showing hypoxemia at current machine settings. I have Increased Pmin to 9. Will need to repeat oximetry with the changes in pressure. Please call patient with oximetry results and pressure changes and notify him I will place an order for repeat oximetry.

## 2021-06-22 ENCOUNTER — TELEPHONE (OUTPATIENT)
Dept: INTERNAL MEDICINE CLINIC | Age: 66
End: 2021-06-22

## 2021-06-22 DIAGNOSIS — E11.9 TYPE 2 DIABETES MELLITUS WITHOUT COMPLICATION, WITHOUT LONG-TERM CURRENT USE OF INSULIN (HCC): Primary | ICD-10-CM

## 2021-06-22 RX ORDER — GLUCOSAMINE HCL/CHONDROITIN SU 500-400 MG
CAPSULE ORAL
Qty: 100 STRIP | Refills: 5 | Status: SHIPPED | OUTPATIENT
Start: 2021-06-22 | End: 2022-05-02

## 2021-06-22 RX ORDER — LANCETS 30 GAUGE
1 EACH MISCELLANEOUS 2 TIMES DAILY
Qty: 100 EACH | Refills: 5 | Status: SHIPPED | OUTPATIENT
Start: 2021-06-22 | End: 2022-05-02

## 2021-06-22 NOTE — TELEPHONE ENCOUNTER
Pt has a mask refit 07/14/2021.  He is aware of the pressure change and that an overnight study will be needed

## 2021-06-22 NOTE — TELEPHONE ENCOUNTER
----- Message from [facility] sent at 6/22/2021 11:41 AM EDT -----  Subject: Message to Provider    QUESTIONS  Information for Provider? Kaylie Richmond is needing a new One touch machine, he   would prefer the [employer] Guide Simply More. It takes the fast clicks so   he will need some of these as well to go with the new machine and test   strips. His old machine is not working at all even after changing the   batteries. ---------------------------------------------------------------------------  --------------  Marques TO  What is the best way for the office to contact you? OK to leave message on   voicemail  Preferred Call Back Phone Number? [phone]  ---------------------------------------------------------------------------  --------------  SCRIPT ANSWERS  Relationship to Patient? Other  Representative Name? Carlos Zabala  Is the Representative on the appropriate HIPAA document in Epic?  Yes

## 2021-06-22 NOTE — TELEPHONE ENCOUNTER
Sent to [facility] in [place]. Accu-Check Guide/Accu-check Simply is not in [facility] so I sent in a generic prescription for the meter, test strips, and lancets. They will need to speak to the pharmacist regarding the desired brand.   saw

## 2021-07-29 ENCOUNTER — TELEPHONE (OUTPATIENT)
Dept: PULMONOLOGY | Age: 66
End: 2021-07-29

## 2021-07-30 NOTE — TELEPHONE ENCOUNTER
Results from oximetry have not been received at this time. Results in chart are not the correct patient. May need to call DME for results.

## 2021-08-02 ENCOUNTER — TELEPHONE (OUTPATIENT)
Dept: PULMONOLOGY | Age: 66
End: 2021-08-02

## 2021-08-02 DIAGNOSIS — G47.33 OSA (OBSTRUCTIVE SLEEP APNEA): Primary | ICD-10-CM

## 2021-08-02 DIAGNOSIS — G47.34 NOCTURNAL HYPOXIA: ICD-10-CM

## 2021-08-02 NOTE — TELEPHONE ENCOUNTER
Spoke with pt to review pulse oximeter results. Due to results Pt will need to have a titration study. Pt agrees with plan.

## 2021-08-02 NOTE — TELEPHONE ENCOUNTER
Patient is consistently showing hypoxia on two overnight oximetry study's despite pressure changes. Patient needs an in lab titration and possible bilevel to control hypoxia.

## 2021-08-04 ENCOUNTER — TELEPHONE (OUTPATIENT)
Dept: PULMONOLOGY | Age: 66
End: 2021-08-04

## 2021-08-31 ENCOUNTER — TELEPHONE (OUTPATIENT)
Dept: PULMONOLOGY | Age: 66
End: 2021-08-31

## 2021-08-31 NOTE — TELEPHONE ENCOUNTER
Message was left on office phone by patient with questions about his scheduled sleep study. Please call patient at [phone].

## 2021-09-01 ENCOUNTER — HOSPITAL ENCOUNTER (OUTPATIENT)
Dept: SLEEP CENTER | Age: 66
Discharge: HOME | End: 2021-09-01
Payer: MEDICARE

## 2021-09-01 DIAGNOSIS — G47.33 OSA (OBSTRUCTIVE SLEEP APNEA): ICD-10-CM

## 2021-09-01 DIAGNOSIS — G47.34 NOCTURNAL HYPOXIA: ICD-10-CM

## 2021-09-01 PROCEDURE — 95811 POLYSOM 6/>YRS CPAP 4/> PARM: CPT | Performed by: INTERNAL MEDICINE

## 2021-09-01 PROCEDURE — 95811 POLYSOM 6/>YRS CPAP 4/> PARM: CPT

## 2021-09-07 ENCOUNTER — TELEPHONE (OUTPATIENT)
Dept: PULMONOLOGY | Age: 66
End: 2021-09-07

## 2021-09-07 NOTE — PROGRESS NOTES
Salud Fitzgerald. : 1955     Diagnosis: [x] MARCELLUS (G47.33) [] CSA (G47.31) [] Apnea (G47.30)   Length of Need: [x] 12 Months [] 99 Months [] Other:    Machine (JALEN!): [] Respironics Dream Station   2   Auto [x] ResMed AirSense     Auto S11 [] Other:     []  CPAP () [x] Bilevel ()   Mode: [] Auto [] Spontaneous    Mode: [x] Auto [] Spontaneous       IPAP max 25 cmH2O  EPAP min 16 cmH2O  PS 5 cmH2O     Comfort Settings:   - Ramp Pressure: 5 cmH2O                                        - Ramp time: 15 min                                     -  Flex/EPR - 3 full time                                    - For ResMed Bilevel (TiMax-4 sec   TiMin- 0.2 sec)     Humidifier: [x] Heated ()        [x] Water chamber replacement ()/ 1 per 6 months        Mask:   [] Nasal () /1 per 3 months [x] Full Face () /1 per 3 months   [] Patient choice -Size and fit mask [x] Patient Choice - Size and fit mask   [] Dispense:  [] Dispense:    [] Headgear () / 1 per 3 months [x] Headgear () / 1 per 3 months   [] Replacement Nasal Cushion ()/2 per month [x] Interface Replacement ()/1 per month   [] Replacement Nasal Pillows ()/2 per month         Tubing: [x] Heated ()/1 per 3 months    [] Standard ()/1 per 3 months [] Other:           Filters: [x] Non-disposable ()/1 per 6 months     [x] Ultra-Fine, Disposable ()/2 per month        Miscellaneous: [] Chin Strap ()/ 1 per 6 months [] O2 bleed-in:       LPM   [] Oximetry on CPAP/Bilevel []  Other:    [x] Modem: ()         Start Order Date: 21    MEDICAL JUSTIFICATION:  I, the undersigned, certify that the above prescribed supplies are medically necessary for this patients wellbeing. In my opinion, the supplies are both reasonable and necessary in reference to accepted standards of medicalpractice in treatment of this patients condition.     Dori Polanco MD      NPI: [id number] Order Signed Date: 21    Electronically signed by Ayaka Middleton MD on 2021 at 3:52 PM    Alma Clock.  1955  [address]  [phone] (home)   [phone] (mobile)      Insurance Info (confirm with patient if correct):  Payor/Plan Subscr  Sex Relation Sub.  Ins. ID Effective Group Num

## 2021-09-08 ENCOUNTER — TELEPHONE (OUTPATIENT)
Dept: PULMONOLOGY | Age: 66
End: 2021-09-08

## 2021-09-08 NOTE — TELEPHONE ENCOUNTER
Spoke with pt to review HST. Order to be sent to [address] [place] [address]  F/U scheduled.

## 2021-09-15 ENCOUNTER — OFFICE VISIT (OUTPATIENT)
Dept: INTERNAL MEDICINE CLINIC | Age: 66
End: 2021-09-15
Payer: MEDICARE

## 2021-09-15 VITALS
DIASTOLIC BLOOD PRESSURE: 80 MMHG | SYSTOLIC BLOOD PRESSURE: 130 MMHG | OXYGEN SATURATION: 95 % | HEART RATE: 88 BPM | WEIGHT: 263.6 LBS | BODY MASS INDEX: 32.95 KG/M2

## 2021-09-15 DIAGNOSIS — H46.9 OPTIC NEURITIS: ICD-10-CM

## 2021-09-15 DIAGNOSIS — E66.9 NON MORBID OBESITY, UNSPECIFIED OBESITY TYPE: Chronic | ICD-10-CM

## 2021-09-15 DIAGNOSIS — I10 ESSENTIAL HYPERTENSION: ICD-10-CM

## 2021-09-15 DIAGNOSIS — E11.9 TYPE 2 DIABETES MELLITUS WITHOUT COMPLICATION, WITHOUT LONG-TERM CURRENT USE OF INSULIN (HCC): ICD-10-CM

## 2021-09-15 DIAGNOSIS — G47.33 OBSTRUCTIVE SLEEP APNEA SYNDROME: ICD-10-CM

## 2021-09-15 DIAGNOSIS — I25.10 CORONARY ARTERY DISEASE INVOLVING NATIVE CORONARY ARTERY OF NATIVE HEART WITHOUT ANGINA PECTORIS: Chronic | ICD-10-CM

## 2021-09-15 DIAGNOSIS — E78.5 HYPERLIPIDEMIA LDL GOAL <70: Primary | ICD-10-CM

## 2021-09-15 PROCEDURE — 4040F PNEUMOC VAC/ADMIN/RCVD: CPT | Performed by: INTERNAL MEDICINE

## 2021-09-15 PROCEDURE — 99214 OFFICE O/P EST MOD 30 MIN: CPT | Performed by: INTERNAL MEDICINE

## 2021-09-15 PROCEDURE — 3017F COLORECTAL CA SCREEN DOC REV: CPT | Performed by: INTERNAL MEDICINE

## 2021-09-15 PROCEDURE — 1123F ACP DISCUSS/DSCN MKR DOCD: CPT | Performed by: INTERNAL MEDICINE

## 2021-09-15 PROCEDURE — 2022F DILAT RTA XM EVC RTNOPTHY: CPT | Performed by: INTERNAL MEDICINE

## 2021-09-15 PROCEDURE — G0009 ADMIN PNEUMOCOCCAL VACCINE: HCPCS | Performed by: INTERNAL MEDICINE

## 2021-09-15 PROCEDURE — G8427 DOCREV CUR MEDS BY ELIG CLIN: HCPCS | Performed by: INTERNAL MEDICINE

## 2021-09-15 PROCEDURE — 90732 PPSV23 VACC 2 YRS+ SUBQ/IM: CPT | Performed by: INTERNAL MEDICINE

## 2021-09-15 PROCEDURE — G8417 CALC BMI ABV UP PARAM F/U: HCPCS | Performed by: INTERNAL MEDICINE

## 2021-09-15 PROCEDURE — 3044F HG A1C LEVEL LT 7.0%: CPT | Performed by: INTERNAL MEDICINE

## 2021-09-15 PROCEDURE — 1036F TOBACCO NON-USER: CPT | Performed by: INTERNAL MEDICINE

## 2021-09-15 RX ORDER — METFORMIN HYDROCHLORIDE 500 MG/1
TABLET, EXTENDED RELEASE ORAL
Qty: 270 TABLET | Refills: 3 | Status: SHIPPED | OUTPATIENT
Start: 2021-09-15 | End: 2021-10-11 | Stop reason: SDUPTHER

## 2021-09-15 RX ORDER — METFORMIN HYDROCHLORIDE 500 MG/1
1500 TABLET, EXTENDED RELEASE ORAL
Qty: 180 TABLET | Refills: 0 | Status: SHIPPED | OUTPATIENT
Start: 2021-09-15 | End: 2021-09-15

## 2021-09-15 NOTE — ASSESSMENT & PLAN NOTE
Patient does follow with Dr. Rosemary Corado for cardiology. He reports some brief episodes of chest pain. Encourage patient to reach out to Dr. Rosemary Corado.

## 2021-09-15 NOTE — PROGRESS NOTES
Patient: Sabina George is a 78 y.o. male who presents today with the following Chief Complaint(s):  Chief Complaint   Patient presents with    Check-Up     sugars running 200 fasting (pt declined Flu vaccine)       HPI     Here today for follow up. MARCELLUS- needing a new CPAP machine. DM- sugars have been running around 200. Has been trying to do better with his diet lately. Does drink a lot of Crystal Light instead of water- about 1/2 gallon per day. HTN- no issues with blood pressure. HLD- no issues with Crestor. Labs from [facility] reviewed. Has had a few episodes of brief CP in the past few months. Does see Dr. Brock Felder for cardiology. Last stress test was normal.     Cannot take a flu shot d/t allergy. Labs 2021:  CMP: ; K4.5; glucose 213; BUN 18; creatinine 1.1; AST 30; ALT 46; alk phos 93  Lipid panel: Total cholesterol 141; ; HDL 42; LDL 66  Urine for microalbumin: Urine microalbumin to creatinine ratio 29 (normal)  Hemoglobin A1c 8.4%    Allergies   Allergen Reactions    Influenza Vaccines Rash    Lisinopril Rash      Past Medical History:   Diagnosis Date    CAD (coronary artery disease)     Hyperlipidemia     Hypertension     Obstructive sleep apnea syndrome 2021    Type 2 diabetes mellitus without complication [facility])       Past Surgical History:   Procedure Laterality Date    FOOT SURGERY Right 2020    PTCA      PTCA/Stent x 1 vessel.  following MI      Social History     Socioeconomic History    Marital status:      Spouse name: Not on file    Number of children: Not on file    Years of education: Not on file    Highest education level: Not on file   Occupational History    Not on file   Tobacco Use    Smoking status: Former Smoker     Packs/day: 1.00     Years: 40.00     Pack years: 40.00     Start date: 0     Quit date: 2020     Years since quittin.3    Smokeless tobacco: Never Used    Tobacco comment: trying to quit   Vaping Use    Vaping Use: Never used   Substance and Sexual Activity    Alcohol use: Yes     Alcohol/week: 1.0 standard drinks     Types: 1 Shots of liquor per week     Comment: weekly or less    Drug use: Not on file    Sexual activity: Yes     Partners: Female   Other Topics Concern    Not on file   Social History Narrative    Not on file     Social Determinants of Health     Financial Resource Strain:     Difficulty of Paying Living Expenses:    Food Insecurity:     Worried About Running Out of Food in the Last Year:     [address] in the Last Year:    Transportation Needs:     Lack of Transportation (Medical):  Lack of Transportation (Non-Medical):    Physical Activity:     Days of Exercise per Week:     Minutes of Exercise per Session:    Stress:     Feeling of Stress :    Social Connections:     Frequency of Communication with Friends and Family:     Frequency of Social Gatherings with Friends and Family:     Attends Nondenominational Services:     Active Member of Clubs or Organizations:     Attends Club or Organization Meetings:     Marital Status:    Intimate Partner Violence:     Fear of Current or Ex-Partner:     Emotionally Abused:     Physically Abused:     Sexually Abused:      Family History   Problem Relation Age of Onset    Breast Cancer Mother 52    Heart Attack Father 64    Hypertension Father    Jenny Maravilla Sister 43    Drug Abuse Sister 62            Heart Attack Maternal Grandfather 52    Heart Attack Son 34        Outpatient Medications Prior to Visit   Medication Sig Dispense Refill    blood glucose monitor strips Test 2 times a day & as needed for symptoms of irregular blood glucose.  100 strip 5    blood glucose monitor kit and supplies Check sugars BID and prn 1 kit 0    Lancets MISC 1 each by Does not apply route 2 times daily 100 each 5    Dulaglutide (TRULICITY) 1.5 OQ/1.6AN SOPN Inject 1.5 mg into the skin once a week 4 pen 5    rosuvastatin (CRESTOR) 20 MG tablet TAKE 1 TABLET BY MOUTH DAILY 90 tablet 3    amLODIPine (NORVASC) 2.5 MG tablet Take 2.5 mg by mouth daily      losartan (COZAAR) 100 MG tablet Take 1 tablet by mouth daily 90 tablet 3    omeprazole (PRILOSEC) 20 MG delayed release capsule TAKE 1 CAPSULE BY MOUTH EVERY DAY 90 capsule 3    aspirin 81 MG chewable tablet Take 81 mg by mouth      carvedilol (COREG) 6.25 MG tablet 6.25 mg daily       clopidogrel (PLAVIX) 75 MG tablet TAKE 1 TABLET BY MOUTH DAILY.  metFORMIN (GLUCOPHAGE-XR) 500 MG extended release tablet TAKE 2 TABLETS BY MOUTH DAILY WITH BREAKFAST 180 tablet 0     No facility-administered medications prior to visit. Patient'spast medical history, surgical history, family history, medications,  and allergies  were all reviewed and updated as appropriate today. Review of Systems    /80   Pulse 88   Wt 263 lb 9.6 oz (119.6 kg)   SpO2 95%   BMI 32.95 kg/m²   Physical Exam  Vitals and nursing note reviewed. Constitutional:       Appearance: He is well-developed. He is not toxic-appearing. HENT:      Head: Normocephalic. Right Ear: Tympanic membrane, ear canal and external ear normal.      Left Ear: Tympanic membrane, ear canal and external ear normal.      Mouth/Throat:      Pharynx: No oropharyngeal exudate or posterior oropharyngeal erythema. Eyes:      General: No scleral icterus. Extraocular Movements: Extraocular movements intact. Conjunctiva/sclera: Conjunctivae normal.      Pupils: Pupils are equal, round, and reactive to light. Neck:      Thyroid: No thyroid mass or thyromegaly. Vascular: No carotid bruit. Cardiovascular:      Rate and Rhythm: Normal rate and regular rhythm. Pulses:           Dorsalis pedis pulses are 2+ on the right side and 2+ on the left side. Posterior tibial pulses are 2+ on the right side and 2+ on the left side. Heart sounds: Normal heart sounds. No murmur heard.      Pulmonary: WITH BREAKFAST 270 tablet 3     No current facility-administered medications for this visit. Return in about 3 months (around 12/15/2021).

## 2021-09-15 NOTE — PATIENT INSTRUCTIONS
Vandana Marts helps your blood sugars by causing your kidneys to filter out excess sugar into your urine. In doing this, it does increase your risk of bladder infections and yeast infections. To prevent this, please make sure that you drink lots of water, you urinate when you need to, and clean well following urination. You may also feel light headed as a result of this medication as it does cause you to urinate more and may help to lower your blood pressure. Please let me know if this is happening. Please drink plenty of water. This medication is very good as it will help you lose weight and is protective of your heart. Patient Education        dapagliflozin  Pronunciation:  DAP a gli ANNAE zin  Brand:  Vandana Marts  What is the most important information I should know about dapagliflozin? You should not use dapagliflozin if you have diabetic ketoacidosis, severe kidney disease, or if you are on dialysis. Taking dapagliflozin can make you dehydrated, which could cause you to feel weak or dizzy (especially when you stand up). Dapagliflozin can cause serious infections in the penis or vagina. Get medical help right away if you have burning, itching, odor, discharge, pain, tenderness, redness or swelling of the genital or rectal area, fever, or if you don't feel well. What is dapagliflozin? Dapagliflozin is an oral diabetes medicine that helps control blood sugar levels. Dapagliflozin works by helping the kidneys get rid of glucose from your bloodstream.  Dapagliflozin is used together with diet and exercise to improve blood sugar control in adults with type 2 diabetes mellitus. Dapagliflozin is also used to lower the risk of needing to be in the hospital for heart failure in adults with type 2 diabetes who also have heart disease. Dapagliflozin is not for treating type 1 diabetes. Dapagliflozin may also be used for purposes not listed in this medication guide.   What should I discuss with my healthcare provider before taking dapagliflozin? You should not use dapagliflozin if you are allergic to it, or if you have:  · severe kidney disease (or if you are on dialysis); or  · diabetic ketoacidosis (call your doctor for treatment). Tell your doctor if you have ever had:  · liver or kidney disease;  · bladder infections or other urination problems;  · low blood pressure;  · problems with your pancreas, including surgery;  · if you are dehydrated;  · if you drink alcohol often; or  · if you are on a low salt diet. Follow your doctor's instructions about using this medicine if you are pregnant. Blood sugar control is very important during pregnancy, and your dose needs may be different during each trimester. You should not use dapagliflozin during the second or third trimester of pregnancy. You should not breast-feed while using this medicine. Dapagliflozin is not approved for use by anyone younger than 25years old. How should I take dapagliflozin? Follow all directions on your prescription label and read all medication guides or instruction sheets. Your doctor may occasionally change your dose. Use the medicine exactly as directed. You may take dapagliflozin with or without food. Call your doctor if you are sick with vomiting or diarrhea, if you consume less food or fluid than usual, or if you are sweating more than usual.  Your blood sugar will need to be checked often, and you may also need to test the level of ketones your urine. Dapagliflozin can cause life-threatening ketoacidosis (too much acid in the blood). Even if your blood sugar is normal, contact your doctor if a urine test shows that you have ketones in the urine. You may have low blood sugar (hypoglycemia) and feel very hungry, dizzy, irritable, confused, anxious, or shaky. To quickly treat hypoglycemia, eat or drink a fast-acting source of sugar (fruit juice, hard candy, crackers, raisins, or non-diet soda).   Your doctor may prescribe a glucagon injection kit in case you have severe hypoglycemia. Be sure your family or close friends know how to give you this injection in an emergency. Also watch for signs of high blood sugar (hyperglycemia) such as increased thirst or urination. Blood sugar levels can be affected by stress, illness, surgery, exercise, alcohol use, or skipping meals. You may need to stop taking dapagliflozin for at least 3 days before a surgery. Ask your doctor before changing your dose or medication schedule. This medicine can affect the results of certain medical tests. Tell any doctor who treats you that you are using dapagliflozin. Dapagliflozin is only part of a complete treatment program that may also include diet, exercise, weight control, blood sugar testing, and special medical care. Follow your doctor's instructions very closely. Store at room temperature away from moisture and heat. What happens if I miss a dose? Take the medicine as soon as you can, but skip the missed dose if it is almost time for your next dose. Do not take two doses at one time. What happens if I overdose? Seek emergency medical attention or call the Poison Help line at 1-679.855.4896. What should I avoid while taking dapagliflozin? Avoid getting up too fast from a sitting or lying position, or you may feel dizzy. What are the possible side effects of dapagliflozin? Get emergency medical help if you have signs of an allergic reaction: hives; difficult breathing; swelling of your face, lips, tongue, or throat. Seek medical attention right away if you have signs of a genital infection (penis or vagina): burning, itching, odor, discharge, pain, tenderness, redness or swelling of the genital or rectal area, fever, not feeling well. These symptoms may get worse quickly.   Call your doctor at once if you have:  · little or no urination;  · dehydration symptoms --dizziness, weakness, feeling light-headed (like you might pass out);  · kidney problems --little or no urination, swelling in your feet or ankles, feeling tired or short of breath;  · ketoacidosis (too much acid in the blood) --nausea, vomiting, stomach pain, confusion, unusual drowsiness, or trouble breathing; or  · signs of a bladder infection --pain or burning when you urinate, increased urination, blood in your urine, fever, pain in your pelvis or back. Side effects may be more likely to occur in older adults. Common side effects may include:  · genital yeast infection;  · urinating more than usual; or  · sore throat and runny or stuffy nose. This is not a complete list of side effects and others may occur. Call your doctor for medical advice about side effects. You may report side effects to FDA at 5-911-FDA-5863. What other drugs will affect dapagliflozin? Tell your doctor about all your other medicines, especially:  · insulin or other oral diabetes medicines; or  · a diuretic or \"water pill. \"  This list is not complete. Other drugs may affect dapagliflozin, including prescription and over-the-counter medicines, vitamins, and herbal products. Not all possible drug interactions are listed here. Where can I get more information? Your pharmacist can provide more information about dapagliflozin. Remember, keep this and all other medicines out of the reach of children, never share your medicines with others, and use this medication only for the indication prescribed. Every effort has been made to ensure that the information provided by [address] is accurate, up-to-date, and complete, but no guarantee is made to that effect. Drug information contained herein may be time sensitive. [facility] information has been compiled for use by healthcare practitioners and consumers in the [place] and therefore [facility] does not warrant that uses outside of the [place] are appropriate, unless specifically indicated otherwise.  [facility]'s drug information does not endorse drugs, diagnose patients or recommend therapy. [facility]'s drug information is an informational resource designed to assist licensed healthcare practitioners in caring for their patients and/or to serve consumers viewing this service as a supplement to, and not a substitute for, the expertise, skill, knowledge and judgment of healthcare practitioners. The absence of a warning for a given drug or drug combination in no way should be construed to indicate that the drug or drug combination is safe, effective or appropriate for any given patient. [facility] does not assume any responsibility for any aspect of healthcare administered with the aid of information [facility] provides. The information contained herein is not intended to cover all possible uses, directions, precautions, warnings, drug interactions, allergic reactions, or adverse effects. If you have questions about the drugs you are taking, check with your doctor, nurse or pharmacist.  Copyright 2253-7911 [address]. Version: 6.01. Revision date: 3/19/2020. Care instructions adapted under license by [facility] ([facility]). If you have questions about a medical condition or this instruction, always ask your healthcare professional. [place] [address] any warranty or liability for your use of this information.

## 2021-09-26 PROBLEM — K92.1 BLOODY STOOL: Status: RESOLVED | Noted: 2020-03-23 | Resolved: 2021-09-26

## 2021-09-26 NOTE — ASSESSMENT & PLAN NOTE
Struggling with getting CPAP pressures adjusted. He is likely in need of a new machine (? BiPAP). Continues to follow with sleep medicine.

## 2021-09-26 NOTE — ASSESSMENT & PLAN NOTE
Not well controlled. Continue Trulicity 1.5 mg weekly and Metformin ER 1000 mg daily. Add Farxiga 10 mg daily. Urine for microalbumin is in the upper limits of normal.  [address] should help with this. May need to add insulin. Encourage patient to drink more water than Crystal light.

## 2021-09-26 NOTE — ASSESSMENT & PLAN NOTE
Patient's weight is stable. Coletta Grounds should help with weight loss.     Wt Readings from Last 3 Encounters:   09/15/21 263 lb 9.6 oz (119.6 kg)   05/18/21 262 lb 3.2 oz (118.9 kg)   05/18/21 261 lb (118.4 kg)

## 2021-10-11 ENCOUNTER — TELEPHONE (OUTPATIENT)
Dept: INTERNAL MEDICINE CLINIC | Age: 66
End: 2021-10-11

## 2021-10-11 RX ORDER — METFORMIN HYDROCHLORIDE 750 MG/1
1500 TABLET, EXTENDED RELEASE ORAL
Qty: 180 TABLET | Refills: 3 | Status: SHIPPED | OUTPATIENT
Start: 2021-10-11 | End: 2022-09-09

## 2021-10-11 NOTE — TELEPHONE ENCOUNTER
Pt calling requesting refill of Metformin 750 mg    Last written not at this mg  Last OV 9/15/21  Next OV 12/14/21  Last recommended OV NA    Please send to [facility]

## 2021-10-15 RX ORDER — AMLODIPINE BESYLATE 2.5 MG/1
2.5 TABLET ORAL DAILY
Qty: 30 TABLET | Refills: 3 | OUTPATIENT
Start: 2021-10-15

## 2021-10-15 RX ORDER — RANOLAZINE 500 MG/1
TABLET, EXTENDED RELEASE ORAL
Qty: 180 TABLET | Refills: 3 | OUTPATIENT
Start: 2021-10-15

## 2021-10-15 NOTE — TELEPHONE ENCOUNTER
DARLENE has not seen patient since 2019. I am not sure if the medication was d/c in the chart in error. You saw him Dec '20, do you mind looking to see if it is OK to restart? Thank you.

## 2021-10-15 NOTE — TELEPHONE ENCOUNTER
Medication Refill    Medication needing refilled:   amLODIPine (NORVASC)     Dosage of the medication: 2.5 mg    How are you taking this medication (QD, BID, TID, QID, PRN):  1 tablet daily    30 or 90 day supply called in: 90    When will you run out of your medication: next week    Which Pharmacy are we sending the medication to?:    Ila Walker [address] [place] [id number] [facility], [address] [place] [id number]

## 2021-10-15 NOTE — TELEPHONE ENCOUNTER
Pt called to refill the Ranexa 500 mg - this med is on the discontinued list.  Pt is asking why he was taken off of this medication? Please call to advise. Thank you.

## 2021-10-15 NOTE — TELEPHONE ENCOUNTER
Please advise on request.. I do not see that we have ordered this medication or that it has been refilled.      Lov 12/14/2020 NPKL   Labs No labs   Appt no appt scheduled please advise thanks

## 2021-10-15 NOTE — TELEPHONE ENCOUNTER
Please advise for it states in the medication tab that the patient had stopped taking this med. Please advise if he will restart or continue off? Thanks.

## 2021-10-18 ENCOUNTER — TELEPHONE (OUTPATIENT)
Dept: PULMONOLOGY | Age: 66
End: 2021-10-18

## 2021-10-18 ENCOUNTER — TELEPHONE (OUTPATIENT)
Dept: CARDIOLOGY CLINIC | Age: 66
End: 2021-10-18

## 2021-10-18 DIAGNOSIS — I10 ESSENTIAL HYPERTENSION: Primary | ICD-10-CM

## 2021-10-18 DIAGNOSIS — I10 ESSENTIAL HYPERTENSION: ICD-10-CM

## 2021-10-18 RX ORDER — AMLODIPINE BESYLATE 2.5 MG/1
2.5 TABLET ORAL DAILY
Qty: 90 TABLET | Refills: 3 | Status: SHIPPED | OUTPATIENT
Start: 2021-10-18 | End: 2021-12-08 | Stop reason: ALTCHOICE

## 2021-10-18 RX ORDER — AMLODIPINE BESYLATE 2.5 MG/1
2.5 TABLET ORAL DAILY
Qty: 30 TABLET | Refills: 2 | Status: SHIPPED | OUTPATIENT
Start: 2021-10-18 | End: 2021-10-18

## 2021-10-18 NOTE — TELEPHONE ENCOUNTER
Patient would like to know why Dr. Giancarlo Bell told his pharmacy, [facility] to stop refilling his Ranolazine, 500mg, prescribed by Dr. Basil Petit.   [phone]

## 2021-10-18 NOTE — TELEPHONE ENCOUNTER
Spoke with patient. Medication was not d/c by Dr. Jenni Parisi.   It was entered on his list twice so one entry was d/c

## 2021-10-18 NOTE — TELEPHONE ENCOUNTER
Medication Refill    Medication needing refilled: ranexa     Dosage of the medication:500mg     How are you taking this medication (QD, BID, TID, QID, PRN):    30 or 90 day supply called in:    When will you run out of your medication:out today    Which Pharmacy are we sending the medication to?: [place] [address] [facility] DID NOT [address] [place] [address] . DCE THOUGHT DR KRAFT HAD STOPPED IT .

## 2021-10-19 ENCOUNTER — PATIENT MESSAGE (OUTPATIENT)
Dept: CARDIOLOGY CLINIC | Age: 66
End: 2021-10-19

## 2021-10-20 RX ORDER — RANOLAZINE 500 MG/1
500 TABLET, EXTENDED RELEASE ORAL 2 TIMES DAILY
Qty: 180 TABLET | Refills: 1 | Status: SHIPPED | OUTPATIENT
Start: 2021-10-20 | End: 2022-04-08 | Stop reason: SDUPTHER

## 2021-10-20 NOTE — TELEPHONE ENCOUNTER
From: Sonia Amaro. To: Comfort Michaels MD  Sent: 10/19/2021 6:24 PM EDT  Subject: Prescription Question    I have been calling since last Friday to get my Ranexa refilled. I see the notes here for Friday and Monday. Does not show that I called on the 19th on Tuesday. I specifically asked for your nurse to call me today. It is 6:15 and still no call. I have never stopped taking Ranexa. Explained that Dr. Scott Rubio did not d/c the Ranexa. It was on the list twice. He took one off and they both came off. Very simple. So why won't you give the ok to call it in? My understanding you aren't suppose to stop it cold [place] ([facility]). So could you please give the ok to call it in because I am out. And could I get a call tomorrow on the 20th with your answer. If you are refusing to renew it I deserve an answer why.  Sincerely Viviane Walker [phone]

## 2021-10-20 NOTE — TELEPHONE ENCOUNTER
Also spoke to patient by phone and reiterated when Tariq Heaton MA wrote in [address] message. Noticed that patient did not have an upcoming appointment scheduled. Patient was last seen by SAMANTA Chang NP in 12/2020. Will send message to  staff asking them for an appointment with Dr. Tabatha Zhang or NP by December 2021.

## 2021-10-21 RX ORDER — CLOPIDOGREL BISULFATE 75 MG/1
75 TABLET ORAL DAILY
Qty: 90 TABLET | Refills: 3 | Status: SHIPPED | OUTPATIENT
Start: 2021-10-21 | End: 2021-12-08 | Stop reason: ALTCHOICE

## 2021-10-21 RX ORDER — PANTOPRAZOLE SODIUM 20 MG/1
20 TABLET, DELAYED RELEASE ORAL
Qty: 90 TABLET | Refills: 3 | Status: SHIPPED | OUTPATIENT
Start: 2021-10-21 | End: 2021-12-14

## 2021-10-22 RX ORDER — GLIPIZIDE 5 MG/1
5 TABLET, FILM COATED, EXTENDED RELEASE ORAL DAILY
Qty: 30 TABLET | Refills: 3 | Status: SHIPPED | OUTPATIENT
Start: 2021-10-22 | End: 2021-10-25

## 2021-10-25 RX ORDER — GLIPIZIDE 5 MG/1
5 TABLET, FILM COATED, EXTENDED RELEASE ORAL DAILY
Qty: 90 TABLET | Refills: 3 | Status: SHIPPED | OUTPATIENT
Start: 2021-10-25

## 2021-12-06 NOTE — PROGRESS NOTES
tablet TAKE 1 TABLET BY MOUTH DAILY 90 tablet 3    metFORMIN (GLUCOPHAGE-XR) 750 MG extended release tablet Take 2 tablets by mouth daily (with breakfast) 180 tablet 3    dapagliflozin (FARXIGA) 10 MG tablet Take 1 tablet by mouth every morning 90 tablet 1    blood glucose monitor strips Test 2 times a day & as needed for symptoms of irregular blood glucose. 100 strip 5    blood glucose monitor kit and supplies Check sugars BID and prn 1 kit 0    Lancets MISC 1 each by Does not apply route 2 times daily 100 each 5    Dulaglutide (TRULICITY) 1.5 VK/1.5OW SOPN Inject 1.5 mg into the skin once a week 4 pen 5    rosuvastatin (CRESTOR) 20 MG tablet TAKE 1 TABLET BY MOUTH DAILY 90 tablet 3    losartan (COZAAR) 100 MG tablet Take 1 tablet by mouth daily 90 tablet 3    aspirin 81 MG chewable tablet Take 81 mg by mouth      carvedilol (COREG) 6.25 MG tablet 6.25 mg daily        No current facility-administered medications for this visit.      [x]Reviewed with patient and will remain unchanged except as mentioned in A/P    PHYSICAL EXAM   Vitals:    12/08/21 1404   BP: 126/88   Pulse: 68   SpO2: 94%        Gen Alert, coop, no distress Heart  Rrr, no mrg   Head NC, AT, no abnorm Abd  Soft, NT, +BS, no mass, no OM   Eyes PER, conj/corn clear Ext  Ext nl, AT, no C/C/E   Nose Nares nl, no drain, NT Pulse 2+ and symmetric   Throat Lips, mucosa, tongue nl Skin Col/text/turg nl, no vis rash/les   Neck S/S, TM, NT, no bruit/JVD Psych Nl mood and affect   Lung CTA-B, unlabored, no DTP Lymph   No cervical or axillary LA   Ch wall NT, no deform Neuro  Nl gross M/S exam     ASSESSMENT AND PLAN     *CAD   Date EF Results   Sx   No concerning   C 2012 11/14  PCI of Cx ([facility])  LM ok, LAD-ectatic, mild, Cx-ostial 40%, OM1 patent stent, RCA-ectatic, small PDA 85% (unchanged)([facility])   MPI 6/15  10/17   66% Abnormal scan demonstrating fixed inferior wall defect  Normal perfusion   TTE 2/15 60%    Plan   Continue aggressive medical treatment at doses above  Continue ASA 81, no indication for plavix from cardiac standpoint  Stop amlodipine with low bp  Coreg should be 6.25bid, not qd   *HTN  Status Controlled, vitals and available ambulatory monitoring logs reviewed personally  Plan Counseled on diet/salt/exercise/weight, continue meds at doses above  *CHOL  Status  controlled with last LDL of 79(goal <70) and HDL of 43(4/21), labs reviewed personally  Plan Counseled on diet/exercise/weight, continue high-intensity statin, lipid/liver surveillance per PCP  *COMPLIANCE  Status Compliant  Plan Discussed importance of compliance with meds/diet/salt/exercise; avoid tob/alc/drugs; patient verbalized understanding  *FOLLOWUP  12 months    [employer] [place] Steffany Lopez, am scribing for and in the presence of Terri Lafleur MD.   SignedSteffany 12/06/21 11:22 AM   Provider Abdulkadir Solis is working as a scribe for and in the presence of me (Terri Lafleur MD). Working as a scribeSteffany may have prepopulated components of this note with my historical  intellectual property under my direct supervision. Any additions to this intellectual property were performed in my presence and at my direction. Furthermore, the content and accuracy of this note have been reviewed by me Terri Lafleur MD).   12/8/2021 12:38 PM    CODING   Category Diagnosis   Stable chronic illness  (30886/80520 - 2 or more) CAD, HTN, CHOL   Chronic illness w/: Exac, progr or SA of Tx  (70155/35721 - 1 or more)    Time 30-39 minutes spent preparing to see patient including reviewing patient history/prior tests/prior consults, performing a medical exam, counseling and educating patient/family/caregiver, ordering medications/tests/procedures, referring and communicating with PCPs and other pertinent consultants, documenting information in the EMR, independently interpreting results and communicating to family and coordination of patient care.

## 2021-12-06 NOTE — PATIENT INSTRUCTIONS
Continue all medications. Ok to stop taking plavix. Stop taking amlodipine (norvasc). Start taking carvedilol to 6.25 mg twice a day.

## 2021-12-08 ENCOUNTER — OFFICE VISIT (OUTPATIENT)
Dept: CARDIOLOGY CLINIC | Age: 66
End: 2021-12-08
Payer: MEDICARE

## 2021-12-08 VITALS
HEART RATE: 68 BPM | OXYGEN SATURATION: 94 % | HEIGHT: 75 IN | WEIGHT: 260.52 LBS | SYSTOLIC BLOOD PRESSURE: 126 MMHG | DIASTOLIC BLOOD PRESSURE: 88 MMHG | BODY MASS INDEX: 32.39 KG/M2

## 2021-12-08 DIAGNOSIS — I25.10 CORONARY ARTERY DISEASE INVOLVING NATIVE CORONARY ARTERY OF NATIVE HEART WITHOUT ANGINA PECTORIS: Primary | ICD-10-CM

## 2021-12-08 DIAGNOSIS — I10 ESSENTIAL HYPERTENSION: ICD-10-CM

## 2021-12-08 DIAGNOSIS — R00.2 PALPITATIONS: ICD-10-CM

## 2021-12-08 DIAGNOSIS — E78.00 HYPERCHOLESTEREMIA: ICD-10-CM

## 2021-12-08 PROCEDURE — G8417 CALC BMI ABV UP PARAM F/U: HCPCS | Performed by: INTERNAL MEDICINE

## 2021-12-08 PROCEDURE — 1036F TOBACCO NON-USER: CPT | Performed by: INTERNAL MEDICINE

## 2021-12-08 PROCEDURE — 3017F COLORECTAL CA SCREEN DOC REV: CPT | Performed by: INTERNAL MEDICINE

## 2021-12-08 PROCEDURE — G8427 DOCREV CUR MEDS BY ELIG CLIN: HCPCS | Performed by: INTERNAL MEDICINE

## 2021-12-08 PROCEDURE — G8484 FLU IMMUNIZE NO ADMIN: HCPCS | Performed by: INTERNAL MEDICINE

## 2021-12-08 PROCEDURE — 4040F PNEUMOC VAC/ADMIN/RCVD: CPT | Performed by: INTERNAL MEDICINE

## 2021-12-08 PROCEDURE — 1123F ACP DISCUSS/DSCN MKR DOCD: CPT | Performed by: INTERNAL MEDICINE

## 2021-12-08 PROCEDURE — 99214 OFFICE O/P EST MOD 30 MIN: CPT | Performed by: INTERNAL MEDICINE

## 2021-12-08 RX ORDER — CARVEDILOL 6.25 MG/1
6.25 TABLET ORAL 2 TIMES DAILY
Qty: 60 TABLET | Refills: 11 | Status: SHIPPED | OUTPATIENT
Start: 2021-12-08 | End: 2022-01-20 | Stop reason: SDUPTHER

## 2021-12-08 RX ORDER — CARVEDILOL 6.25 MG/1
TABLET ORAL
Qty: 180 TABLET | OUTPATIENT
Start: 2021-12-08

## 2021-12-14 ENCOUNTER — OFFICE VISIT (OUTPATIENT)
Dept: INTERNAL MEDICINE CLINIC | Age: 66
End: 2021-12-14
Payer: MEDICAID

## 2021-12-14 VITALS
SYSTOLIC BLOOD PRESSURE: 110 MMHG | BODY MASS INDEX: 32.72 KG/M2 | HEART RATE: 88 BPM | WEIGHT: 261.8 LBS | OXYGEN SATURATION: 96 % | DIASTOLIC BLOOD PRESSURE: 68 MMHG

## 2021-12-14 DIAGNOSIS — E11.9 TYPE 2 DIABETES MELLITUS WITHOUT COMPLICATION, WITHOUT LONG-TERM CURRENT USE OF INSULIN (HCC): Primary | ICD-10-CM

## 2021-12-14 DIAGNOSIS — I25.10 CORONARY ARTERY DISEASE INVOLVING NATIVE CORONARY ARTERY OF NATIVE HEART WITHOUT ANGINA PECTORIS: ICD-10-CM

## 2021-12-14 DIAGNOSIS — I10 ESSENTIAL HYPERTENSION: ICD-10-CM

## 2021-12-14 DIAGNOSIS — G47.33 OBSTRUCTIVE SLEEP APNEA SYNDROME: ICD-10-CM

## 2021-12-14 DIAGNOSIS — E78.5 HYPERLIPIDEMIA LDL GOAL <70: ICD-10-CM

## 2021-12-14 PROCEDURE — G8484 FLU IMMUNIZE NO ADMIN: HCPCS | Performed by: INTERNAL MEDICINE

## 2021-12-14 PROCEDURE — 3044F HG A1C LEVEL LT 7.0%: CPT | Performed by: INTERNAL MEDICINE

## 2021-12-14 PROCEDURE — G8417 CALC BMI ABV UP PARAM F/U: HCPCS | Performed by: INTERNAL MEDICINE

## 2021-12-14 PROCEDURE — 2022F DILAT RTA XM EVC RTNOPTHY: CPT | Performed by: INTERNAL MEDICINE

## 2021-12-14 PROCEDURE — 99214 OFFICE O/P EST MOD 30 MIN: CPT | Performed by: INTERNAL MEDICINE

## 2021-12-14 PROCEDURE — G8427 DOCREV CUR MEDS BY ELIG CLIN: HCPCS | Performed by: INTERNAL MEDICINE

## 2021-12-14 PROCEDURE — 3017F COLORECTAL CA SCREEN DOC REV: CPT | Performed by: INTERNAL MEDICINE

## 2021-12-14 PROCEDURE — 1123F ACP DISCUSS/DSCN MKR DOCD: CPT | Performed by: INTERNAL MEDICINE

## 2021-12-14 PROCEDURE — 4040F PNEUMOC VAC/ADMIN/RCVD: CPT | Performed by: INTERNAL MEDICINE

## 2021-12-14 PROCEDURE — 1036F TOBACCO NON-USER: CPT | Performed by: INTERNAL MEDICINE

## 2021-12-14 RX ORDER — ZOSTER VACCINE RECOMBINANT, ADJUVANTED 50 MCG/0.5
0.5 KIT INTRAMUSCULAR SEE ADMIN INSTRUCTIONS
Qty: 0.5 ML | Refills: 0 | Status: SHIPPED | OUTPATIENT
Start: 2021-12-14 | End: 2022-06-12

## 2021-12-14 RX ORDER — OMEPRAZOLE 20 MG/1
20 CAPSULE, DELAYED RELEASE ORAL DAILY
Qty: 90 CAPSULE | Refills: 3 | Status: SHIPPED | OUTPATIENT
Start: 2021-12-14

## 2021-12-14 RX ORDER — DULAGLUTIDE 3 MG/.5ML
3 INJECTION, SOLUTION SUBCUTANEOUS WEEKLY
Qty: 4 PEN | Refills: 5 | Status: SHIPPED | OUTPATIENT
Start: 2021-12-14 | End: 2022-05-24

## 2021-12-14 RX ORDER — FAMOTIDINE 20 MG/1
20 TABLET, FILM COATED ORAL 2 TIMES DAILY
COMMUNITY
End: 2021-12-14

## 2021-12-14 NOTE — PROGRESS NOTES
Patient: Lilliana Ochoa. is a 78 y.o. male who presents today with the following Chief Complaint(s):  No chief complaint on file. HPI     Did not find Protonix to be as effective as Prilosce. Remains on Prilosec once daily. Is working better for him. Is not taking Pepcid. DM- no issues with blood sugars, staying around 150's. No low sugars. Did see Dr. Monse Donahue last week, stopped Plavix and amlodipine, increased carvedilol. Labs done at [facility] 2021:  CMP , K4.5, chloride 101, CO2 29, glucose 146, BUN 15, creatinine 1.2, ALT 24, AST 35, alk phos 78  Urine for microalbumin 2040.0, urine creatinine 120.3,   Urine microalbumin creatinine ratio 17  Hemoglobin A1c 7.2%    Allergies   Allergen Reactions    Influenza Vaccines Rash    Lisinopril Rash      Past Medical History:   Diagnosis Date    CAD (coronary artery disease)     Hyperlipidemia     Hypertension     Obstructive sleep apnea syndrome 2021    Type 2 diabetes mellitus without complication [facility])       Past Surgical History:   Procedure Laterality Date    FOOT SURGERY Right 2020    PTCA      PTCA/Stent x 1 vessel. following MI      Social History     Socioeconomic History    Marital status:      Spouse name: Not on file    Number of children: Not on file    Years of education: Not on file    Highest education level: Not on file   Occupational History    Not on file   Tobacco Use    Smoking status: Former Smoker     Packs/day: 1.00     Years: 40.00     Pack years: 40.00     Start date: 0     Quit date: 2020     Years since quittin.5    Smokeless tobacco: Never Used    Tobacco comment: trying to quit   Vaping Use    Vaping Use: Never used   Substance and Sexual Activity    Alcohol use:  Yes     Alcohol/week: 1.0 standard drink     Types: 1 Shots of liquor per week     Comment: weekly or less    Drug use: Not on file    Sexual activity: Yes     Partners: Female   Other Topics Concern    Not on file   Social History Narrative    Not on file     Social Determinants of Health     Financial Resource Strain:     Difficulty of Paying Living Expenses: Not on file   Food Insecurity:     Worried About Running Out of Food in the Last Year: Not on file    Desiree of Food in the Last Year: Not on file   Transportation Needs:     Lack of Transportation (Medical): Not on file    Lack of Transportation (Non-Medical):  Not on file   Physical Activity:     Days of Exercise per Week: Not on file    Minutes of Exercise per Session: Not on file   Stress:     Feeling of Stress : Not on file   Social Connections:     Frequency of Communication with Friends and Family: Not on file    Frequency of Social Gatherings with Friends and Family: Not on file    Attends Worship Services: Not on file    Active Member of [address] [place] [address] [employer] or Organizations: Not on file    Attends Club or Organization Meetings: Not on file    Marital Status: Not on file   Intimate Partner Violence:     Fear of Current or Ex-Partner: Not on file    Emotionally Abused: Not on file    Physically Abused: Not on file    Sexually Abused: Not on file   Housing Stability:     Unable to Pay for Housing in the Last Year: Not on file    Number of [place] in the Last Year: Not on file    Unstable Housing in the Last Year: Not on file     Family History   Problem Relation Age of Onset    Breast Cancer Mother 52    Heart Attack Father 64    Hypertension Father     Cancer Sister 43    Drug Abuse Sister 62            Heart Attack Maternal Grandfather 52    Heart Attack Son 29        Outpatient Medications Prior to Visit   Medication Sig Dispense Refill    carvedilol (COREG) 6.25 MG tablet Take 1 tablet by mouth 2 times daily 60 tablet 11    glipiZIDE (GLUCOTROL XL) 5 MG extended release tablet TAKE 1 TABLET BY MOUTH DAILY 90 tablet 3    ranolazine (RANEXA) 500 MG extended release tablet Take 1 tablet by mouth 2 times daily 180 tablet 1    metFORMIN (GLUCOPHAGE-XR) 750 MG extended release tablet Take 2 tablets by mouth daily (with breakfast) 180 tablet 3    dapagliflozin (FARXIGA) 10 MG tablet Take 1 tablet by mouth every morning 90 tablet 1    blood glucose monitor strips Test 2 times a day & as needed for symptoms of irregular blood glucose. 100 strip 5    blood glucose monitor kit and supplies Check sugars BID and prn 1 kit 0    Lancets MISC 1 each by Does not apply route 2 times daily 100 each 5    rosuvastatin (CRESTOR) 20 MG tablet TAKE 1 TABLET BY MOUTH DAILY 90 tablet 3    losartan (COZAAR) 100 MG tablet Take 1 tablet by mouth daily 90 tablet 3    aspirin 81 MG chewable tablet Take 81 mg by mouth      famotidine (PEPCID) 20 MG tablet Take 20 mg by mouth 2 times daily      pantoprazole (PROTONIX) 20 MG tablet Take 1 tablet by mouth every morning (before breakfast) 90 tablet 3    Dulaglutide (TRULICITY) 1.5 YJ/5.0KO SOPN Inject 1.5 mg into the skin once a week 4 pen 5     No facility-administered medications prior to visit. Patient'spast medical history, surgical history, family history, medications,  and allergies  were all reviewed and updated as appropriate today. Review of Systems   Constitutional: Negative for appetite change, fatigue and fever. Respiratory: Negative for chest tightness and shortness of breath. Cardiovascular: Negative for chest pain. Gastrointestinal: Negative for constipation and diarrhea. Skin: Negative for rash. /68   Pulse 88   Wt 261 lb 12.8 oz (118.8 kg)   SpO2 96%   BMI 32.72 kg/m²   Physical Exam  Vitals and nursing note reviewed. Constitutional:       Appearance: He is well-developed. He is not toxic-appearing. HENT:      Head: Normocephalic.       Right Ear: Tympanic membrane, ear canal and external ear normal.      Left Ear: Tympanic membrane, ear canal and external ear normal.      Mouth/Throat:      Pharynx: No oropharyngeal exudate or posterior oropharyngeal erythema. Eyes:      General: No scleral icterus. Extraocular Movements: Extraocular movements intact. Conjunctiva/sclera: Conjunctivae normal.      Pupils: Pupils are equal, round, and reactive to light. Neck:      Thyroid: No thyroid mass or thyromegaly. Vascular: No carotid bruit. Cardiovascular:      Rate and Rhythm: Normal rate and regular rhythm. Heart sounds: Normal heart sounds. No murmur heard. Pulmonary:      Effort: Pulmonary effort is normal.      Breath sounds: Normal breath sounds. Musculoskeletal:      Right lower leg: No edema. Left lower leg: No edema. Lymphadenopathy:      Cervical: No cervical adenopathy. Neurological:      General: No focal deficit present. Mental Status: He is alert and oriented to person, place, and time. Psychiatric:         Mood and Affect: Mood normal.         Behavior: Behavior normal. Behavior is cooperative. ASSESSMENT/PLAN:    Problem List Items Addressed This Visit     Type 2 diabetes mellitus without complication, without long-term current use of insulin (HCC) - Primary (Chronic)      Above goal.  Increase Trulicity to 3 mg weekly. Continue Metformin ER 1000 mg daily and Farxiga 10 mg daily. Relevant Medications    Dulaglutide (TRULICITY) 3 MU/5.5HS SOPN    Other Relevant Orders    Comprehensive Metabolic Panel    Hemoglobin A1C    Microalbumin / Creatinine Urine Ratio    Obstructive sleep apnea syndrome      Remains compliant with CPAP. Hypercholesteremia      Continue Crestor 20 mg daily. Essential hypertension (Chronic)      Blood pressure is well controlled. Remains on losartan 100 mg daily and carvedilol 6.25 mg twice daily. Relevant Orders    CBC Auto Differential    Coronary artery disease involving native coronary artery of native heart without angina pectoris (Chronic)      Patient last saw Dr. Rosy Benavidez last week, 12/8/2021.   He is to follow-up with tenderly. Plavix was discontinued. Amlodipine was discontinued. Carvedilol was increased to 6.25 mg twice daily. Current Outpatient Medications   Medication Sig Dispense Refill    omeprazole (PRILOSEC) 20 MG delayed release capsule Take 1 capsule by mouth Daily 90 capsule 3    Dulaglutide (TRULICITY) 3 DH/5.2QX SOPN Inject 3 mg into the skin once a week 4 pen 5    zoster recombinant adjuvanted vaccine (SHINGRIX) 50 MCG/0.5ML SUSR injection Inject 0.5 mLs into the muscle See Admin Instructions 1 dose now and repeat in 2-6 months 0.5 mL 0    carvedilol (COREG) 6.25 MG tablet Take 1 tablet by mouth 2 times daily 60 tablet 11    glipiZIDE (GLUCOTROL XL) 5 MG extended release tablet TAKE 1 TABLET BY MOUTH DAILY 90 tablet 3    ranolazine (RANEXA) 500 MG extended release tablet Take 1 tablet by mouth 2 times daily 180 tablet 1    metFORMIN (GLUCOPHAGE-XR) 750 MG extended release tablet Take 2 tablets by mouth daily (with breakfast) 180 tablet 3    dapagliflozin (FARXIGA) 10 MG tablet Take 1 tablet by mouth every morning 90 tablet 1    blood glucose monitor strips Test 2 times a day & as needed for symptoms of irregular blood glucose. 100 strip 5    blood glucose monitor kit and supplies Check sugars BID and prn 1 kit 0    Lancets MISC 1 each by Does not apply route 2 times daily 100 each 5    rosuvastatin (CRESTOR) 20 MG tablet TAKE 1 TABLET BY MOUTH DAILY 90 tablet 3    losartan (COZAAR) 100 MG tablet Take 1 tablet by mouth daily 90 tablet 3    aspirin 81 MG chewable tablet Take 81 mg by mouth       No current facility-administered medications for this visit. No follow-ups on file.

## 2021-12-18 ASSESSMENT — ENCOUNTER SYMPTOMS
CONSTIPATION: 0
DIARRHEA: 0
CHEST TIGHTNESS: 0
SHORTNESS OF BREATH: 0

## 2021-12-18 NOTE — ASSESSMENT & PLAN NOTE
Patient last saw Dr. Rigoberto Collazo last week, 12/8/2021. He is to follow-up with tenderly. Plavix was discontinued. Amlodipine was discontinued. Carvedilol was increased to 6.25 mg twice daily.

## 2021-12-18 NOTE — ASSESSMENT & PLAN NOTE
Above goal.  Increase Trulicity to 3 mg weekly. Continue Metformin ER 1000 mg daily and Farxiga 10 mg daily.

## 2021-12-18 NOTE — ASSESSMENT & PLAN NOTE
Blood pressure is well controlled. Remains on losartan 100 mg daily and carvedilol 6.25 mg twice daily.

## 2022-01-03 RX ORDER — LOSARTAN POTASSIUM 100 MG/1
100 TABLET ORAL DAILY
Qty: 90 TABLET | Refills: 3 | Status: SHIPPED | OUTPATIENT
Start: 2022-01-03 | End: 2022-06-24 | Stop reason: SDUPTHER

## 2022-01-11 ENCOUNTER — TELEPHONE (OUTPATIENT)
Dept: INTERNAL MEDICINE CLINIC | Age: 67
End: 2022-01-11

## 2022-01-11 DIAGNOSIS — R19.7 DIARRHEA OF PRESUMED INFECTIOUS ORIGIN: Primary | ICD-10-CM

## 2022-01-11 NOTE — TELEPHONE ENCOUNTER
Stool cultures will be more helpful than a blood test. Stool cultures have been ordered. Will need to  collection kit from lab and return to lab.   Fax order if not [facility] lab. saw

## 2022-01-11 NOTE — TELEPHONE ENCOUNTER
Pt wife calling---pt ate recalled  salad almost a month ago ---has abdominal pain -diarrhea and nausea---said it started soon after he ate it --goes away briefly then comes back with a vengence---wanting to know if a blood test for Listeria would be possible to find out---edgardoasse call the wife at [phone]. Thanks.

## 2022-01-20 ENCOUNTER — TELEPHONE (OUTPATIENT)
Dept: CARDIOLOGY CLINIC | Age: 67
End: 2022-01-20

## 2022-01-20 RX ORDER — CARVEDILOL 6.25 MG/1
6.25 TABLET ORAL 2 TIMES DAILY
Qty: 180 TABLET | Refills: 3 | Status: SHIPPED | OUTPATIENT
Start: 2022-01-20 | End: 2022-11-01 | Stop reason: SDUPTHER

## 2022-02-17 RX ORDER — DAPAGLIFLOZIN 10 MG/1
TABLET, FILM COATED ORAL
Qty: 90 TABLET | Refills: 1 | Status: SHIPPED | OUTPATIENT
Start: 2022-02-17 | End: 2022-08-15 | Stop reason: SDUPTHER

## 2022-03-21 ENCOUNTER — OFFICE VISIT (OUTPATIENT)
Dept: INTERNAL MEDICINE CLINIC | Age: 67
End: 2022-03-21
Payer: MEDICARE

## 2022-03-21 VITALS
HEART RATE: 80 BPM | WEIGHT: 258.8 LBS | SYSTOLIC BLOOD PRESSURE: 120 MMHG | BODY MASS INDEX: 32.35 KG/M2 | DIASTOLIC BLOOD PRESSURE: 70 MMHG | OXYGEN SATURATION: 91 %

## 2022-03-21 DIAGNOSIS — Z12.5 SCREENING FOR PROSTATE CANCER: ICD-10-CM

## 2022-03-21 DIAGNOSIS — E11.29 TYPE 2 DIABETES MELLITUS WITH MICROALBUMINURIA, WITHOUT LONG-TERM CURRENT USE OF INSULIN (HCC): ICD-10-CM

## 2022-03-21 DIAGNOSIS — G47.33 OBSTRUCTIVE SLEEP APNEA SYNDROME: ICD-10-CM

## 2022-03-21 DIAGNOSIS — I10 ESSENTIAL HYPERTENSION: Primary | ICD-10-CM

## 2022-03-21 DIAGNOSIS — R80.9 TYPE 2 DIABETES MELLITUS WITH MICROALBUMINURIA, WITHOUT LONG-TERM CURRENT USE OF INSULIN (HCC): ICD-10-CM

## 2022-03-21 DIAGNOSIS — E78.5 HYPERLIPIDEMIA LDL GOAL <70: ICD-10-CM

## 2022-03-21 PROCEDURE — 3017F COLORECTAL CA SCREEN DOC REV: CPT | Performed by: INTERNAL MEDICINE

## 2022-03-21 PROCEDURE — 3044F HG A1C LEVEL LT 7.0%: CPT | Performed by: INTERNAL MEDICINE

## 2022-03-21 PROCEDURE — 4040F PNEUMOC VAC/ADMIN/RCVD: CPT | Performed by: INTERNAL MEDICINE

## 2022-03-21 PROCEDURE — 2022F DILAT RTA XM EVC RTNOPTHY: CPT | Performed by: INTERNAL MEDICINE

## 2022-03-21 PROCEDURE — G8427 DOCREV CUR MEDS BY ELIG CLIN: HCPCS | Performed by: INTERNAL MEDICINE

## 2022-03-21 PROCEDURE — 1123F ACP DISCUSS/DSCN MKR DOCD: CPT | Performed by: INTERNAL MEDICINE

## 2022-03-21 PROCEDURE — 99214 OFFICE O/P EST MOD 30 MIN: CPT | Performed by: INTERNAL MEDICINE

## 2022-03-21 PROCEDURE — 1036F TOBACCO NON-USER: CPT | Performed by: INTERNAL MEDICINE

## 2022-03-21 PROCEDURE — G8484 FLU IMMUNIZE NO ADMIN: HCPCS | Performed by: INTERNAL MEDICINE

## 2022-03-21 PROCEDURE — G8417 CALC BMI ABV UP PARAM F/U: HCPCS | Performed by: INTERNAL MEDICINE

## 2022-03-21 ASSESSMENT — PATIENT HEALTH QUESTIONNAIRE - PHQ9
SUM OF ALL RESPONSES TO PHQ9 QUESTIONS 1 & 2: 0
SUM OF ALL RESPONSES TO PHQ QUESTIONS 1-9: 0
SUM OF ALL RESPONSES TO PHQ QUESTIONS 1-9: 0
1. LITTLE INTEREST OR PLEASURE IN DOING THINGS: 0
2. FEELING DOWN, DEPRESSED OR HOPELESS: 0
SUM OF ALL RESPONSES TO PHQ QUESTIONS 1-9: 0
SUM OF ALL RESPONSES TO PHQ QUESTIONS 1-9: 0

## 2022-03-21 NOTE — PROGRESS NOTES
Patient: Caden Orosco is a 78 y.o. male who presents today with the following Chief Complaint(s):  Chief Complaint   Patient presents with    Check-Up       HPI     Here today for follow up. Is doing well. DM- sugars running 109-150, no low sugars. No side effects with medications. HTN- no issues with BP. HLD- doing well on Crestor. MARCELLUS- wearing CPAP nightly. Results for orders placed or performed in visit on 03/15/22   Microalbumin / Creatinine Urine Ratio   Result Value Ref Range    Microalbumin, Random Urine 1400.0 MCG/DL    Creatinine, Ur 105.9 MG/DL    Microalbumin Creatinine Ratio 13 <30 MCG/MG CREAT.    Comprehensive Metabolic Panel   Result Value Ref Range    Sodium 140 135 - 148 MEQ/L    Potassium 5.3 3.4 - 5.3 MEQ/L    Chloride 102 96 - 110 MEQ/L    CO2 23 19 - 32 MEQ/L    Glucose 126 (H) 70 - 99 MG/DL    BUN 12 3 - 29 MG/DL    CREATININE 1.2 0.5 - 1.4 MG/DL    Bun/Cre Ratio 10 7 - 25    Glomerular Filtration Rate 66 >60 MLS/MIN/1.73M2    Calcium 9.8 8.5 - 10.5 MG/DL    Total Protein 7.1 6.0 - 8.3 G/DL    Albumin 4.4 3.5 - 5.2 G/DL    Globulin 2.7 1.9 - 3.6 G/DL    Albumin/Globulin Ratio 1.6 0.8 - 2.6 RATIO    Total Bilirubin 0.5 0.0 - 1.2 MG/DL    AST 29 0 - 55 U/L    ALT 32 0 - 60 U/L    Alkaline Phosphatase 84 23 - 144 U/L    Status FASTING    Lipid Panel   Result Value Ref Range    Cholesterol 122 <200 MG/DL    Triglycerides 169 (H) <150 MG/DL    HDL 36 (L) >59 MG/DL    VLDL 34 4 - 38 MG/DL    LDL Calculated 52 <100 MG/DL   Hemoglobin A1C   Result Value Ref Range    Hemoglobin A1C 6.8 (H) 4.0 - 6.0 %   CBC WITH DIFFERENTIAL/PLATELET   Result Value Ref Range    WBC 10.9 3.5 - 10.9 K/uL    RBC 4.21 4.14 - 5.80 M/uL    Hemoglobin 12.5 (L) 13.0 - 17.7 G/DL    Hematocrit 38.4 37.5 - 51.0 %    MCV 91.2 80.0 - 100.0 FL    MCH 29.7 26.0 - 34.0 PG    MCHC 32.6 30.7 - 35.5 G/DL    RDW 13.5 <15.1 %    Platelets 074 971 - 604 K/uL    MPV 11.1 7.2 - 11.7 FL    Differential Type AUTOMATIC METHOD     Neutrophils % 75 42.0 - 80.0 %    Lymphocytes % 16.5 14.0 - 51.0 %    Monocytes % 5.6 4.0 - 12.0 %    Eosinophils % 2.5 0.0 - 5.0 %    Basophils % 0.4 0.0 - 2.0 %    Neutrophils Absolute 8.0 (H) 1.8 - 7.5 K/uL    Lymphocytes Absolute 1.8 0.9 - 4.1 K/uL    Monocytes Absolute 0.6 0.2 - 1.0 K/uL    Eosinophils Absolute 0.3 0.0 - 0.5 K/uL    Basophils Absolute 0.0 0.0 - 0.3 K/uL    RBC Morphology RBC MORPHOLOGY NORMAL     Platelet Comment GIANT PLATELETS       Lab Results   Component Value Date    LABA1C 6.8 (H) 03/15/2022    LABA1C 6.9 2021    LABA1C 8.6 2021     Lab Results   Component Value Date    LABMICR 1400.0 03/15/2022    LDLCALC 52 03/15/2022    CREATININE 1.2 03/15/2022         Allergies   Allergen Reactions    Influenza Vaccines Rash    Lisinopril Rash      Past Medical History:   Diagnosis Date    CAD (coronary artery disease)     Hyperlipidemia     Hypertension     Obstructive sleep apnea syndrome 2021    Type 2 diabetes mellitus without complication [facility])       Past Surgical History:   Procedure Laterality Date    FOOT SURGERY Right 2020    PTCA  210    PTCA/Stent x 1 vessel. following MI      Social History     Socioeconomic History    Marital status:      Spouse name: Not on file    Number of children: Not on file    Years of education: Not on file    Highest education level: Not on file   Occupational History    Not on file   Tobacco Use    Smoking status: Former Smoker     Packs/day: 1.00     Years: 40.00     Pack years: 40.00     Start date: 0     Quit date: 2020     Years since quittin.8    Smokeless tobacco: Never Used    Tobacco comment: trying to quit   Vaping Use    Vaping Use: Never used   Substance and Sexual Activity    Alcohol use:  Yes     Alcohol/week: 1.0 standard drink     Types: 1 Shots of liquor per week     Comment: weekly or less    Drug use: Not on file    Sexual activity: Yes     Partners: Female Other Topics Concern    Not on file   Social History Narrative    Not on file     Social Determinants of Health     Financial Resource Strain:     Difficulty of Paying Living Expenses: Not on file   Food Insecurity:     Worried About Running Out of Food in the Last Year: Not on file    Desiree of Food in the Last Year: Not on file   Transportation Needs:     Lack of Transportation (Medical): Not on file    Lack of Transportation (Non-Medical):  Not on file   Physical Activity:     Days of Exercise per Week: Not on file    Minutes of Exercise per Session: Not on file   Stress:     Feeling of Stress : Not on file   Social Connections:     Frequency of Communication with Friends and Family: Not on file    Frequency of Social Gatherings with Friends and Family: Not on file    Attends [employer] Services: Not on file    Active Member of [address] [place] [address] [employer] or Organizations: Not on file    Attends Club or Organization Meetings: Not on file    Marital Status: Not on file   Intimate Partner Violence:     Fear of Current or Ex-Partner: Not on file    Emotionally Abused: Not on file    Physically Abused: Not on file    Sexually Abused: Not on file   Housing Stability:     Unable to Pay for Housing in the Last Year: Not on file    Number of [place] in the Last Year: Not on file    Unstable Housing in the Last Year: Not on file     Family History   Problem Relation Age of Onset    Breast Cancer Mother 52    Heart Attack Father 64    Hypertension Father     Cancer Sister 43    Drug Abuse Sister 62            Heart Attack Maternal Grandfather 52    Heart Attack Son 29        Outpatient Medications Prior to Visit   Medication Sig Dispense Refill    FARXIGA 10 MG tablet TAKE 1 TABLET BY MOUTH EVERY MORNING 90 tablet 1    carvedilol (COREG) 6.25 MG tablet Take 1 tablet by mouth 2 times daily 180 tablet 3    losartan (COZAAR) 100 MG tablet TAKE 1 TABLET BY MOUTH DAILY 90 tablet 3    omeprazole (PRILOSEC) 20 MG delayed release capsule Take 1 capsule by mouth Daily 90 capsule 3    Dulaglutide (TRULICITY) 3 EU/6.5RH SOPN Inject 3 mg into the skin once a week 4 pen 5    zoster recombinant adjuvanted vaccine (SHINGRIX) 50 MCG/0.5ML SUSR injection Inject 0.5 mLs into the muscle See Admin Instructions 1 dose now and repeat in 2-6 months 0.5 mL 0    glipiZIDE (GLUCOTROL XL) 5 MG extended release tablet TAKE 1 TABLET BY MOUTH DAILY 90 tablet 3    ranolazine (RANEXA) 500 MG extended release tablet Take 1 tablet by mouth 2 times daily 180 tablet 1    metFORMIN (GLUCOPHAGE-XR) 750 MG extended release tablet Take 2 tablets by mouth daily (with breakfast) 180 tablet 3    blood glucose monitor strips Test 2 times a day & as needed for symptoms of irregular blood glucose. 100 strip 5    blood glucose monitor kit and supplies Check sugars BID and prn 1 kit 0    Lancets MISC 1 each by Does not apply route 2 times daily 100 each 5    rosuvastatin (CRESTOR) 20 MG tablet TAKE 1 TABLET BY MOUTH DAILY 90 tablet 3    aspirin 81 MG chewable tablet Take 81 mg by mouth       No facility-administered medications prior to visit. Patient'spast medical history, surgical history, family history, medications,  and allergies  were all reviewed and updated as appropriate today. Review of Systems   Constitutional: Negative for appetite change, fatigue, fever and unexpected weight change. Eyes: Negative for visual disturbance. Respiratory: Negative for chest tightness and shortness of breath. Cardiovascular: Negative for chest pain. Gastrointestinal: Negative for abdominal pain, constipation and diarrhea. Endocrine: Negative for cold intolerance, heat intolerance, polydipsia, polyphagia and polyuria. No low blood sugars   Skin: Negative for rash. /70   Pulse 80   Wt 258 lb 12.8 oz (117.4 kg)   SpO2 91%   BMI 32.35 kg/m²   Physical Exam  Vitals and nursing note reviewed.    Constitutional: Appearance: He is well-developed. He is not toxic-appearing. HENT:      Head: Normocephalic. Right Ear: Tympanic membrane, ear canal and external ear normal.      Left Ear: Tympanic membrane, ear canal and external ear normal.      Mouth/Throat:      Pharynx: No oropharyngeal exudate or posterior oropharyngeal erythema. Eyes:      General: No scleral icterus. Extraocular Movements: Extraocular movements intact. Conjunctiva/sclera: Conjunctivae normal.      Pupils: Pupils are equal, round, and reactive to light. Neck:      Thyroid: No thyroid mass or thyromegaly. Vascular: No carotid bruit. Cardiovascular:      Rate and Rhythm: Normal rate and regular rhythm. Heart sounds: Normal heart sounds. No murmur heard. Pulmonary:      Effort: Pulmonary effort is normal.      Breath sounds: Normal breath sounds. Musculoskeletal:      Right lower leg: No edema. Left lower leg: No edema. Lymphadenopathy:      Cervical: No cervical adenopathy. Neurological:      General: No focal deficit present. Mental Status: He is alert and oriented to person, place, and time. Psychiatric:         Mood and Affect: Mood normal.         Behavior: Behavior normal. Behavior is cooperative. ASSESSMENT/PLAN:    Problem List Items Addressed This Visit     Essential hypertension - Primary (Chronic)      Blood pressure is well controlled. Remains on losartan 100 mg daily and carvedilol 6.25 mg twice daily. Hyperlipidemia LDL goal <70      Continue Crestor 20 mg daily. Obstructive sleep apnea syndrome      Remains compliant with CPAP. Type 2 diabetes mellitus with microalbuminuria, without long-term current use of insulin (HCC)     Improved with hemoglobin A1c of 6.8%. Continue Trulicity 3 mg weekly, Metformin ER 1000 mg daily and Farxiga 10 mg daily.          Relevant Orders    Comprehensive Metabolic Panel    Hemoglobin A1C    Microalbumin / Creatinine Urine Ratio Other Visit Diagnoses     Screening for prostate cancer        Relevant Orders    PSA Screening          Current Outpatient Medications   Medication Sig Dispense Refill    FARXIGA 10 MG tablet TAKE 1 TABLET BY MOUTH EVERY MORNING 90 tablet 1    carvedilol (COREG) 6.25 MG tablet Take 1 tablet by mouth 2 times daily 180 tablet 3    losartan (COZAAR) 100 MG tablet TAKE 1 TABLET BY MOUTH DAILY 90 tablet 3    omeprazole (PRILOSEC) 20 MG delayed release capsule Take 1 capsule by mouth Daily 90 capsule 3    Dulaglutide (TRULICITY) 3 CF/2.9IA SOPN Inject 3 mg into the skin once a week 4 pen 5    zoster recombinant adjuvanted vaccine (SHINGRIX) 50 MCG/0.5ML SUSR injection Inject 0.5 mLs into the muscle See Admin Instructions 1 dose now and repeat in 2-6 months 0.5 mL 0    glipiZIDE (GLUCOTROL XL) 5 MG extended release tablet TAKE 1 TABLET BY MOUTH DAILY 90 tablet 3    ranolazine (RANEXA) 500 MG extended release tablet Take 1 tablet by mouth 2 times daily 180 tablet 1    metFORMIN (GLUCOPHAGE-XR) 750 MG extended release tablet Take 2 tablets by mouth daily (with breakfast) 180 tablet 3    blood glucose monitor strips Test 2 times a day & as needed for symptoms of irregular blood glucose. 100 strip 5    blood glucose monitor kit and supplies Check sugars BID and prn 1 kit 0    Lancets MISC 1 each by Does not apply route 2 times daily 100 each 5    rosuvastatin (CRESTOR) 20 MG tablet TAKE 1 TABLET BY MOUTH DAILY 90 tablet 3    aspirin 81 MG chewable tablet Take 81 mg by mouth       No current facility-administered medications for this visit. Return in about 4 months (around 7/21/2022).

## 2022-03-23 ENCOUNTER — OFFICE VISIT (OUTPATIENT)
Dept: PULMONOLOGY | Age: 67
End: 2022-03-23
Payer: MEDICARE

## 2022-03-23 VITALS
DIASTOLIC BLOOD PRESSURE: 60 MMHG | SYSTOLIC BLOOD PRESSURE: 92 MMHG | BODY MASS INDEX: 31.38 KG/M2 | OXYGEN SATURATION: 95 % | HEIGHT: 75 IN | HEART RATE: 80 BPM | WEIGHT: 252.4 LBS

## 2022-03-23 DIAGNOSIS — G47.33 OBSTRUCTIVE SLEEP APNEA SYNDROME: Primary | ICD-10-CM

## 2022-03-23 DIAGNOSIS — I10 ESSENTIAL HYPERTENSION: Chronic | ICD-10-CM

## 2022-03-23 DIAGNOSIS — E66.9 NON MORBID OBESITY, UNSPECIFIED OBESITY TYPE: Chronic | ICD-10-CM

## 2022-03-23 DIAGNOSIS — R80.9 TYPE 2 DIABETES MELLITUS WITH MICROALBUMINURIA, WITHOUT LONG-TERM CURRENT USE OF INSULIN (HCC): ICD-10-CM

## 2022-03-23 DIAGNOSIS — I25.10 CORONARY ARTERY DISEASE INVOLVING NATIVE CORONARY ARTERY OF NATIVE HEART WITHOUT ANGINA PECTORIS: Chronic | ICD-10-CM

## 2022-03-23 DIAGNOSIS — E11.29 TYPE 2 DIABETES MELLITUS WITH MICROALBUMINURIA, WITHOUT LONG-TERM CURRENT USE OF INSULIN (HCC): ICD-10-CM

## 2022-03-23 PROCEDURE — G8417 CALC BMI ABV UP PARAM F/U: HCPCS | Performed by: NURSE PRACTITIONER

## 2022-03-23 PROCEDURE — 2022F DILAT RTA XM EVC RTNOPTHY: CPT | Performed by: NURSE PRACTITIONER

## 2022-03-23 PROCEDURE — 1123F ACP DISCUSS/DSCN MKR DOCD: CPT | Performed by: NURSE PRACTITIONER

## 2022-03-23 PROCEDURE — 3017F COLORECTAL CA SCREEN DOC REV: CPT | Performed by: NURSE PRACTITIONER

## 2022-03-23 PROCEDURE — G8427 DOCREV CUR MEDS BY ELIG CLIN: HCPCS | Performed by: NURSE PRACTITIONER

## 2022-03-23 PROCEDURE — 4040F PNEUMOC VAC/ADMIN/RCVD: CPT | Performed by: NURSE PRACTITIONER

## 2022-03-23 PROCEDURE — G8484 FLU IMMUNIZE NO ADMIN: HCPCS | Performed by: NURSE PRACTITIONER

## 2022-03-23 PROCEDURE — 3044F HG A1C LEVEL LT 7.0%: CPT | Performed by: NURSE PRACTITIONER

## 2022-03-23 PROCEDURE — 1036F TOBACCO NON-USER: CPT | Performed by: NURSE PRACTITIONER

## 2022-03-23 PROCEDURE — 99214 OFFICE O/P EST MOD 30 MIN: CPT | Performed by: NURSE PRACTITIONER

## 2022-03-23 ASSESSMENT — SLEEP AND FATIGUE QUESTIONNAIRES
HOW LIKELY ARE YOU TO NOD OFF OR FALL ASLEEP WHILE SITTING INACTIVE IN A PUBLIC PLACE: 0
ESS TOTAL SCORE: 4
HOW LIKELY ARE YOU TO NOD OFF OR FALL ASLEEP WHILE WATCHING TV: 0
HOW LIKELY ARE YOU TO NOD OFF OR FALL ASLEEP WHILE SITTING AND TALKING TO SOMEONE: 0
HOW LIKELY ARE YOU TO NOD OFF OR FALL ASLEEP WHILE SITTING QUIETLY AFTER LUNCH WITHOUT ALCOHOL: 1
HOW LIKELY ARE YOU TO NOD OFF OR FALL ASLEEP WHILE SITTING AND READING: 0
HOW LIKELY ARE YOU TO NOD OFF OR FALL ASLEEP WHEN YOU ARE A PASSENGER IN A CAR FOR AN HOUR WITHOUT A BREAK: 0
HOW LIKELY ARE YOU TO NOD OFF OR FALL ASLEEP WHILE LYING DOWN TO REST IN THE AFTERNOON WHEN CIRCUMSTANCES PERMIT: 3
HOW LIKELY ARE YOU TO NOD OFF OR FALL ASLEEP IN A CAR, WHILE STOPPED FOR A FEW MINUTES IN TRAFFIC: 0

## 2022-03-23 NOTE — LETTER
[facility] [place] [facility]  [address]  [address] [place] [address]  Phone: [phone]  Fax: [phone]    March 23, 2022       Patient: Severiano Alley. MR Number: [id number]   YOB: 1955   Date of Visit: 3/23/2022       Gabe Michael was seen for a follow up visit today. Here is my assessment and plan as well as an attached copy of his visit today:    Coronary artery disease involving native coronary artery of native heart without angina pectoris   Chronic- Stable. Discussed the importance of treating obstructive sleep apnea as part of the management of this disorder. Cont any meds per PCP and other physicians. Essential hypertension   Chronic- Stable. Discussed the importance of treating obstructive sleep apnea as part of the management of this disorder. Cont any meds per PCP and other physicians. Type 2 diabetes mellitus with microalbuminuria, without long-term current use of insulin (HCC)  Chronic- Stable. Discussed the importance of treating obstructive sleep apnea as part of the management of this disorder. Cont any meds per PCP and other physicians. Non morbid obesity, unspecified obesity type   Chronic-not stable:  Discussed importance of treating obstructive sleep apnea and getting sufficient sleep to assist with weight control. Encouraged him to work on weight loss through diet and exercise. Recommended DASH or Mediterranean diets. Obstructive sleep apnea syndrome  Chronic-with progression/exacerbation: Reviewed results of titration with patient and provided a copy for his records. Reviewed and analyzed results of physiologic download from patient's machine and reviewed with patient. Supplies and parts as needed for his machine. These are medically necessary. Limit caffeine use after 3pm. Based on the analyzed data will change following settings: EPAP min decreased to 15, PS decreased to 4 for patient comfort. Will check data in 1 week. If mask leak is still high patient will need to work with DME on mask fit. Provided resources for him to review different types of masks and mask liners. Once mask leak controlled and patient doing well with machine he will need an overnight oximetry due to hypoxia shown on titration study and pressure decrease. If no improvement in his symptoms, patient will require an in lab bilevel titration. Verbal and written instruction on PAP equipment cleaning and disinfection schedule provided. We will see patient back in 3 months or sooner if issues arise. If you have questions or concerns, please do not hesitate to call me. I look forward to following Madison Duggan along with you.     Sincerely,    EBEN Ledesma    CC providers:  Danuta Harper DO  [address] [address]  [facility]

## 2022-03-23 NOTE — PROGRESS NOTES
Sherri Lamar MD, [facility], [facility]  Ayaka Thaddeus CNP [address]  [address] [facility], [facility] Aric Ovalles E [phone]   [facility] Gary Andrews. [address] [place] [address]. [address] [phone] SLEEP MEDICINE  [address] [place] [id number] [phone]      Assessment/Plan:      1. Obstructive sleep apnea syndrome  Assessment & Plan:  Chronic-with progression/exacerbation: Reviewed results of titration with patient and provided a copy for his records. Reviewed and analyzed results of physiologic download from patient's machine and reviewed with patient. Supplies and parts as needed for his machine. These are medically necessary. Limit caffeine use after 3pm. Based on the analyzed data will change following settings: EPAP min decreased to 15, PS decreased to 4 for patient comfort. Will check data in 1 week. If mask leak is still high patient will need to work with DME on mask fit. Provided resources for him to review different types of masks and mask liners. Once mask leak controlled and patient doing well with machine he will need an overnight oximetry due to hypoxia shown on titration study and pressure decrease. If no improvement in his symptoms, patient will require an in lab bilevel titration. Verbal and written instruction on PAP equipment cleaning and disinfection schedule provided. We will see patient back in 3 months or sooner if issues arise. 2. Coronary artery disease involving native coronary artery of native heart without angina pectoris  Assessment & Plan:   Chronic- Stable. Discussed the importance of treating obstructive sleep apnea as part of the management of this disorder. Cont any meds per PCP and other physicians. 3. Essential hypertension  Assessment & Plan:   Chronic- Stable.   Discussed the importance of treating obstructive sleep apnea as part of the management of this disorder. Cont any meds per PCP and other physicians. 4. Type 2 diabetes mellitus with microalbuminuria, without long-term current use of insulin ([facility])  Assessment & Plan:  Chronic- Stable. Discussed the importance of treating obstructive sleep apnea as part of the management of this disorder. Cont any meds per PCP and other physicians. 5. Non morbid obesity, unspecified obesity type  Assessment & Plan:   Chronic-not stable:  Discussed importance of treating obstructive sleep apnea and getting sufficient sleep to assist with weight control. Encouraged him to work on weight loss through diet and exercise. Recommended DASH or Mediterranean diets. Reviewed, analyzed, and documented physiologic data from patient's PAP machine. This information was analyzed to assess complexity and medical decision making in regards to further testing and management. The primary encounter diagnosis was Obstructive sleep apnea syndrome. Diagnoses of Coronary artery disease involving native coronary artery of native heart without angina pectoris, Essential hypertension, Type 2 diabetes mellitus with microalbuminuria, without long-term current use of insulin ([facility] [address] and Non morbid obesity, unspecified obesity type were also pertinent to this visit. The chronic medical conditions listed are directly related to the primary diagnosis listed above. The management of the primary diagnosis affects the secondary diagnosis and vice versa. Subjective:   Subjective   Patient ID: Gwen Art is a 78 y.o. male.     Chief Complaint   Patient presents with    Sleep Apnea       HPI:  Machine Modem/Download Info:  Compliance (hours/night): 7.12 hrs/night  % of nights >= 4 hrs: 92 %  Download AHI (/hour): 3.9 /HR   Average IPAP Pressure: 21.2 cmH2O  Average EPAP Pressure: 16.2 cmH2O               AUTO BILEVEL - Settings (ResMed)  IPAP Max: 25 cmH2O  EPAP Min: 16 cmH2O  Pressure Support: 5         PAP Mask  Mask Type: Full Face mask     Dilip Kolb. reports he is is doing well acclimating to his new bilevel machine. He completed a titration in September and was found to have failed CPAP and was changed to bilevel. He has had trouble with the higher pressure since changing to bilevel. Pressure feels too high. Mask also is leaking more and will wake him during the night. He is not waking as rested. He will usually nap for 1 to 2 hours during the day without his machine. he denies headaches, congestion, nosebleeds, dryness, aerophagia, or drowsiness while driving. He denies leg movements at night. Had titration done on 21-the study showed improved sleep-related breathing disorder with bilevel pressures of 21/16 cm H2O. Patient failed CPAP and was changed to bilevel. Periodic leg movements during sleep 36.5/hour. Recommend auto bilevel pressures IPAP max: 25, EPAP min: 16, pressure support: 5 cm H2O. Treatment for PLMD's if clinically indicated. [facility]    [place] - Total score: 4    Social History     Socioeconomic History    Marital status:      Spouse name: Not on file    Number of children: Not on file    Years of education: Not on file    Highest education level: Not on file   Occupational History    Not on file   Tobacco Use    Smoking status: Former Smoker     Packs/day: 1.00     Years: 40.00     Pack years: 40.00     Start date: 0     Quit date: 2020     Years since quittin.8    Smokeless tobacco: Never Used    Tobacco comment: trying to quit   Vaping Use    Vaping Use: Never used   Substance and Sexual Activity    Alcohol use:  Yes     Alcohol/week: 1.0 standard drink     Types: 1 Shots of liquor per week     Comment: weekly or less    Drug use: Not on file    Sexual activity: Yes     Partners: Female   Other Topics Concern    Not on file   Social History Narrative    Not on file     Social Determinants of Health     Financial Resource Strain:     Difficulty of Paying Living Expenses: Not on file   Food Insecurity:     Worried About [facility] in the Last Year: Not on file    Ran Out of Food in the Last Year: Not on file   Transportation Needs:     Lack of Transportation (Medical): Not on file    Lack of Transportation (Non-Medical): Not on file   Physical Activity:     Days of Exercise per Week: Not on file    Minutes of Exercise per Session: Not on file   Stress:     Feeling of Stress : Not on file   Social Connections:     Frequency of Communication with Friends and Family: Not on file    Frequency of Social Gatherings with Friends and Family: Not on file    Attends [employer] Services: Not on file    Active Member of [address] [place] [address] or Organizations: Not on file    Attends Club or Organization Meetings: Not on file    Marital Status: Not on file   Intimate Partner Violence:     Fear of Current or Ex-Partner: Not on file    Emotionally Abused: Not on file    Physically Abused: Not on file    Sexually Abused: Not on file   Housing Stability:     Unable to Pay for Housing in the Last Year: Not on file    Number of [place] in the Last Year: Not on file    Unstable Housing in the Last Year: Not on file       Current Outpatient Medications   Medication Instructions    aspirin 81 mg, Oral    blood glucose monitor kit and supplies Check sugars BID and prn    blood glucose monitor strips Test 2 times a day & as needed for symptoms of irregular blood glucose.     carvedilol (COREG) 6.25 mg, Oral, 2 TIMES DAILY    FARXIGA 10 MG tablet TAKE 1 TABLET BY MOUTH EVERY MORNING    glipiZIDE (GLUCOTROL XL) 5 mg, Oral, DAILY    Lancets MISC 1 each, Does not apply, 2 TIMES DAILY    losartan (COZAAR) 100 mg, Oral, DAILY    metFORMIN (GLUCOPHAGE-XR) 1,500 mg, Oral, DAILY WITH BREAKFAST    omeprazole (PRILOSEC) 20 mg, Oral, DAILY    ranolazine (RANEXA) 500 mg, Oral, 2 TIMES DAILY    rosuvastatin (CRESTOR) 20 mg, Oral, DAILY    Shingrix 50 mcg, IntraMUSCular, SEE ADMIN INSTRUCTIONS, 1 dose now and repeat in 2-6 months    Trulicity 3 mg, SubCUTAneous, WEEKLY          Vitals:  Weight BMI   Wt Readings from Last 3 Encounters:   03/23/22 252 lb 6.4 oz (114.5 kg)   03/21/22 258 lb 12.8 oz (117.4 kg)   12/14/21 261 lb 12.8 oz (118.8 kg)    Body mass index is 31.55 kg/m².      BP HR SaO2   BP Readings from Last 3 Encounters:   03/23/22 92/60   03/21/22 120/70   12/14/21 110/68    Pulse Readings from Last 3 Encounters:   03/23/22 80   03/21/22 80   12/14/21 88    SpO2 Readings from Last 3 Encounters:   03/23/22 95%   03/21/22 91%   12/14/21 96%        Electronically signed by EBEN Parker on 3/23/2022 at 3:50 PM

## 2022-03-27 ASSESSMENT — ENCOUNTER SYMPTOMS
CONSTIPATION: 0
ABDOMINAL PAIN: 0
CHEST TIGHTNESS: 0
SHORTNESS OF BREATH: 0
DIARRHEA: 0

## 2022-03-27 NOTE — ASSESSMENT & PLAN NOTE
Improved with hemoglobin A1c of 6.8%. Continue Trulicity 3 mg weekly, Metformin ER 1000 mg daily and Farxiga 10 mg daily.

## 2022-03-31 RX ORDER — ROSUVASTATIN CALCIUM 20 MG/1
20 TABLET, COATED ORAL DAILY
Qty: 90 TABLET | Refills: 3 | Status: SHIPPED | OUTPATIENT
Start: 2022-03-31 | End: 2022-10-19

## 2022-04-08 ENCOUNTER — TELEPHONE (OUTPATIENT)
Dept: INTERNAL MEDICINE CLINIC | Age: 67
End: 2022-04-08

## 2022-04-08 RX ORDER — RANOLAZINE 500 MG/1
500 TABLET, EXTENDED RELEASE ORAL 2 TIMES DAILY
Qty: 60 TABLET | Refills: 0 | Status: SHIPPED | OUTPATIENT
Start: 2022-04-08 | End: 2022-04-11

## 2022-04-08 NOTE — TELEPHONE ENCOUNTER
[employer] did send over a month supply for  The pt. The next time it will need to come from Cardiology.

## 2022-04-08 NOTE — TELEPHONE ENCOUNTER
Pt  calling requesting refill of Ranexa    Last written 10/20/21Last OV 3/21/22  Next OV NA  Last recommended OV NA    Please send to  [facility]

## 2022-04-11 RX ORDER — RANOLAZINE 500 MG/1
TABLET, EXTENDED RELEASE ORAL
Qty: 180 TABLET | Refills: 3 | Status: SHIPPED | OUTPATIENT
Start: 2022-04-11

## 2022-04-14 ENCOUNTER — TELEPHONE (OUTPATIENT)
Dept: PULMONOLOGY | Age: 67
End: 2022-04-14

## 2022-04-14 NOTE — TELEPHONE ENCOUNTER
Patient's wife Lolly Lesch called saying Aicha Peralta said she was going to call patient in two weeks to check on the changes she made. I explained that he was probably supposed to call us. Wife said he is still experiencing issues with leaks due the velcro on the headgear not holding and he is ot due to get a new one for another month. She wanted to know if his report could be checked to see how he is doing. Please call Lolly Lesch at [phone].

## 2022-05-01 DIAGNOSIS — E11.9 TYPE 2 DIABETES MELLITUS WITHOUT COMPLICATION, WITHOUT LONG-TERM CURRENT USE OF INSULIN (HCC): ICD-10-CM

## 2022-05-02 RX ORDER — BLOOD SUGAR DIAGNOSTIC
STRIP MISCELLANEOUS
Qty: 100 STRIP | Refills: 5 | Status: SHIPPED | OUTPATIENT
Start: 2022-05-02

## 2022-05-02 RX ORDER — LANCETS
EACH MISCELLANEOUS
Qty: 102 EACH | Refills: 5 | Status: SHIPPED | OUTPATIENT
Start: 2022-05-02

## 2022-05-11 RX ORDER — RANOLAZINE 500 MG/1
TABLET, EXTENDED RELEASE ORAL
Qty: 180 TABLET | Refills: 1 | OUTPATIENT
Start: 2022-05-11

## 2022-05-11 NOTE — TELEPHONE ENCOUNTER
Lov 12/8/2021 Dr Alan Paul no labs   100 Anatoly Leo 4/11/2022 Disp 180+3  Appt no appt scheduled Duplicate request

## 2022-05-24 RX ORDER — DULAGLUTIDE 3 MG/.5ML
INJECTION, SOLUTION SUBCUTANEOUS
Qty: 2 ML | Refills: 3 | Status: SHIPPED | OUTPATIENT
Start: 2022-05-24 | End: 2022-09-07

## 2022-06-24 ENCOUNTER — OFFICE VISIT (OUTPATIENT)
Dept: INTERNAL MEDICINE CLINIC | Age: 67
End: 2022-06-24
Payer: MEDICARE

## 2022-06-24 VITALS
SYSTOLIC BLOOD PRESSURE: 96 MMHG | HEART RATE: 76 BPM | HEIGHT: 75 IN | WEIGHT: 250 LBS | DIASTOLIC BLOOD PRESSURE: 60 MMHG | BODY MASS INDEX: 31.08 KG/M2 | OXYGEN SATURATION: 96 %

## 2022-06-24 DIAGNOSIS — M25.512 ACUTE PAIN OF LEFT SHOULDER: ICD-10-CM

## 2022-06-24 DIAGNOSIS — R80.9 TYPE 2 DIABETES MELLITUS WITH MICROALBUMINURIA, WITHOUT LONG-TERM CURRENT USE OF INSULIN (HCC): ICD-10-CM

## 2022-06-24 DIAGNOSIS — R41.89 COGNITIVE IMPAIRMENT: ICD-10-CM

## 2022-06-24 DIAGNOSIS — G47.33 OBSTRUCTIVE SLEEP APNEA SYNDROME: Primary | ICD-10-CM

## 2022-06-24 DIAGNOSIS — E11.29 TYPE 2 DIABETES MELLITUS WITH MICROALBUMINURIA, WITHOUT LONG-TERM CURRENT USE OF INSULIN (HCC): ICD-10-CM

## 2022-06-24 DIAGNOSIS — I10 ESSENTIAL HYPERTENSION: ICD-10-CM

## 2022-06-24 PROCEDURE — 1036F TOBACCO NON-USER: CPT | Performed by: INTERNAL MEDICINE

## 2022-06-24 PROCEDURE — 3017F COLORECTAL CA SCREEN DOC REV: CPT | Performed by: INTERNAL MEDICINE

## 2022-06-24 PROCEDURE — 1123F ACP DISCUSS/DSCN MKR DOCD: CPT | Performed by: INTERNAL MEDICINE

## 2022-06-24 PROCEDURE — 3044F HG A1C LEVEL LT 7.0%: CPT | Performed by: INTERNAL MEDICINE

## 2022-06-24 PROCEDURE — G8417 CALC BMI ABV UP PARAM F/U: HCPCS | Performed by: INTERNAL MEDICINE

## 2022-06-24 PROCEDURE — 99214 OFFICE O/P EST MOD 30 MIN: CPT | Performed by: INTERNAL MEDICINE

## 2022-06-24 PROCEDURE — G8427 DOCREV CUR MEDS BY ELIG CLIN: HCPCS | Performed by: INTERNAL MEDICINE

## 2022-06-24 PROCEDURE — 2022F DILAT RTA XM EVC RTNOPTHY: CPT | Performed by: INTERNAL MEDICINE

## 2022-06-24 RX ORDER — LOSARTAN POTASSIUM 50 MG/1
50 TABLET ORAL DAILY
Qty: 90 TABLET | Refills: 1 | Status: SHIPPED | OUTPATIENT
Start: 2022-06-24

## 2022-06-24 NOTE — PATIENT INSTRUCTIONS
Decrease losartan to 50 mg daily- you may cut the 100 mg pills in half but I did send a new script to Vivi. [facility] at [facility]. You do not need an appointment. They are located at the [facility]. Hours are from 5 pm-9 pm Mon-Friday and Saturdays 9 am- 1 pm.   Phone number [phone]    Memory test did show some impairment but not dementia. I would like to check a CT of your head and repeat the memory test in 1 year. If your memory seems to worsen we can always repeat the test sooner.
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
## 2022-06-26 PROBLEM — M25.512 ACUTE PAIN OF LEFT SHOULDER: Status: ACTIVE | Noted: 2022-06-26

## 2022-06-26 PROBLEM — R41.89 COGNITIVE IMPAIRMENT: Status: ACTIVE | Noted: 2022-06-26

## 2022-06-27 NOTE — ASSESSMENT & PLAN NOTE
JEANETTE 23/30. Repeat in 1 year, sooner if needed. Wife has noticed changes in his cognitive ability. Check CT of head.

## 2022-06-27 NOTE — ASSESSMENT & PLAN NOTE
Patient is going to be out of town quite a bit this summer. Physical therapy would be difficult given his scheduling. Recommend referral to physical therapy versus being seen at Ortho after-hours clinic.

## 2022-06-27 NOTE — ASSESSMENT & PLAN NOTE
Improved with hemoglobin A1c of 6.4%. Continue Trulicity 3 mg weekly, Metformin ER 1000 mg daily and Farxiga 10 mg daily.

## 2022-06-29 ENCOUNTER — OFFICE VISIT (OUTPATIENT)
Dept: PULMONOLOGY | Age: 67
End: 2022-06-29
Payer: MEDICARE

## 2022-06-29 VITALS
HEIGHT: 75 IN | HEART RATE: 78 BPM | SYSTOLIC BLOOD PRESSURE: 98 MMHG | BODY MASS INDEX: 31.76 KG/M2 | OXYGEN SATURATION: 97 % | DIASTOLIC BLOOD PRESSURE: 52 MMHG | WEIGHT: 255.4 LBS

## 2022-06-29 DIAGNOSIS — E66.9 NON MORBID OBESITY, UNSPECIFIED OBESITY TYPE: Chronic | ICD-10-CM

## 2022-06-29 DIAGNOSIS — R80.9 TYPE 2 DIABETES MELLITUS WITH MICROALBUMINURIA, WITHOUT LONG-TERM CURRENT USE OF INSULIN (HCC): ICD-10-CM

## 2022-06-29 DIAGNOSIS — E11.29 TYPE 2 DIABETES MELLITUS WITH MICROALBUMINURIA, WITHOUT LONG-TERM CURRENT USE OF INSULIN (HCC): ICD-10-CM

## 2022-06-29 DIAGNOSIS — I10 ESSENTIAL HYPERTENSION: Chronic | ICD-10-CM

## 2022-06-29 DIAGNOSIS — G47.33 OBSTRUCTIVE SLEEP APNEA SYNDROME: Primary | ICD-10-CM

## 2022-06-29 DIAGNOSIS — I25.10 CORONARY ARTERY DISEASE INVOLVING NATIVE CORONARY ARTERY OF NATIVE HEART WITHOUT ANGINA PECTORIS: Chronic | ICD-10-CM

## 2022-06-29 PROCEDURE — 3044F HG A1C LEVEL LT 7.0%: CPT | Performed by: NURSE PRACTITIONER

## 2022-06-29 PROCEDURE — 2022F DILAT RTA XM EVC RTNOPTHY: CPT | Performed by: NURSE PRACTITIONER

## 2022-06-29 PROCEDURE — 1036F TOBACCO NON-USER: CPT | Performed by: NURSE PRACTITIONER

## 2022-06-29 PROCEDURE — G8427 DOCREV CUR MEDS BY ELIG CLIN: HCPCS | Performed by: NURSE PRACTITIONER

## 2022-06-29 PROCEDURE — G8417 CALC BMI ABV UP PARAM F/U: HCPCS | Performed by: NURSE PRACTITIONER

## 2022-06-29 PROCEDURE — 99214 OFFICE O/P EST MOD 30 MIN: CPT | Performed by: NURSE PRACTITIONER

## 2022-06-29 PROCEDURE — 1123F ACP DISCUSS/DSCN MKR DOCD: CPT | Performed by: NURSE PRACTITIONER

## 2022-06-29 PROCEDURE — 3017F COLORECTAL CA SCREEN DOC REV: CPT | Performed by: NURSE PRACTITIONER

## 2022-06-29 ASSESSMENT — SLEEP AND FATIGUE QUESTIONNAIRES
HOW LIKELY ARE YOU TO NOD OFF OR FALL ASLEEP WHILE LYING DOWN TO REST IN THE AFTERNOON WHEN CIRCUMSTANCES PERMIT: 2
HOW LIKELY ARE YOU TO NOD OFF OR FALL ASLEEP WHILE SITTING AND TALKING TO SOMEONE: 0
ESS TOTAL SCORE: 2
HOW LIKELY ARE YOU TO NOD OFF OR FALL ASLEEP WHILE SITTING AND READING: 0
HOW LIKELY ARE YOU TO NOD OFF OR FALL ASLEEP WHILE SITTING INACTIVE IN A PUBLIC PLACE: 0
HOW LIKELY ARE YOU TO NOD OFF OR FALL ASLEEP IN A CAR, WHILE STOPPED FOR A FEW MINUTES IN TRAFFIC: 0
HOW LIKELY ARE YOU TO NOD OFF OR FALL ASLEEP WHEN YOU ARE A PASSENGER IN A CAR FOR AN HOUR WITHOUT A BREAK: 0
HOW LIKELY ARE YOU TO NOD OFF OR FALL ASLEEP WHILE SITTING QUIETLY AFTER LUNCH WITHOUT ALCOHOL: 0
HOW LIKELY ARE YOU TO NOD OFF OR FALL ASLEEP WHILE WATCHING TV: 0

## 2022-06-29 NOTE — LETTER
[facility] Sleep Medicine  [address]  [address] [place] [address]  Phone: [phone]  Fax: [phone]    June 29, 2022       Patient: Suzanna Brown. MR Number: [id number]   YOB: 1955   Date of Visit: 6/29/2022       Trevin Qureshi was seen for a follow up visit today. Here is my assessment and plan as well as an attached copy of his visit today:    Coronary artery disease involving native coronary artery of native heart without angina pectoris   Chronic- Stable. Discussed the importance of treating obstructive sleep apnea as part of the management of this disorder. Cont any meds per PCP and other physicians. Essential hypertension   Chronic- Stable. Discussed the importance of treating obstructive sleep apnea as part of the management of this disorder. Cont any meds per PCP and other physicians. Type 2 diabetes mellitus with microalbuminuria, without long-term current use of insulin (HCC)  Chronic- Stable. Discussed the importance of treating obstructive sleep apnea as part of the management of this disorder. Cont any meds per PCP and other physicians. Non morbid obesity, unspecified obesity type   Chronic-not stable:  Discussed importance of treating obstructive sleep apnea and getting sufficient sleep to assist with weight control. Encouraged him to work on weight loss through diet and exercise. Recommended DASH or Mediterranean diets. Obstructive sleep apnea syndrome  Chronic-Stable: Reviewed and analyzed results of physiologic download from patient's machine and reviewed with patient. Supplies and parts as needed for his machine. These are medically necessary. Limit caffeine use after 3pm. Based on the analyzed data will continue with current settings. Encouraged him to continue working with his equipment company on mask fit to help control large mask leak.   Discussed controlling mask leak will help with oral dryness and water consumption from his machine and humidifier. Discussed alternative treatments for his MARCELLUS including inspire. At this time patient would like to be evaluated for [facility]. Will place consult to Dr. Pavan Anguiano for consultation for [facility]. Recommended patient schedule a follow-up appointment with our office in 3 months to see his how he is doing with mask fit. Patient declines at this time and reports he would prefer to hold off at this time until more information is obtained about Inspire. Encouraged the patient to call our office with any questions or concerns. If you have questions or concerns, please do not hesitate to call me. I look forward to following Pavan Byrd along with you.     Sincerely,    EBEN Baker providers:  Lizeth Calero DO  [address] [address]  [address] [place]

## 2022-06-29 NOTE — PROGRESS NOTES
Haydee Schaffer [facility]  [address] [facility], [address]- [phone]   [facility] [address] Jamey Dennis. [address] [place] [address]. [address] [phone] SLEEP MEDICINE  [address] [place] [id number] [phone]      Assessment/Plan:      1. Obstructive sleep apnea syndrome  Assessment & Plan:  Chronic-Stable: Reviewed and analyzed results of physiologic download from patient's machine and reviewed with patient. Supplies and parts as needed for his machine. These are medically necessary. Limit caffeine use after 3pm. Based on the analyzed data will continue with current settings. Encouraged him to continue working with his equipment company on mask fit to help control large mask leak. Discussed controlling mask leak will help with oral dryness and water consumption from his machine and humidifier. Discussed alternative treatments for his MARCELLUS including inspire. At this time patient would like to be evaluated for [facility]. Will place consult to Dr. Monica Mcguire for consultation for [facility]. Recommended patient schedule a follow-up appointment with our office in 3 months to see his how he is doing with mask fit. Patient declines at this time and reports he would prefer to hold off at this time until more information is obtained about Inspire. Encouraged the patient to call our office with any questions or concerns. Orders:  -     Blake Barrios MD, Sleep Medicine, [facility]  2. Coronary artery disease involving native coronary artery of native heart without angina pectoris  Assessment & Plan:   Chronic- Stable. Discussed the importance of treating obstructive sleep apnea as part of the management of this disorder. Cont any meds per PCP and other physicians. 3. Essential hypertension  Assessment & Plan:   Chronic- Stable.   Discussed the importance of treating cmH2O  Pressure Support: 4         PAP Mask  Mask Type: Full Face mask     [facility]. reports he is doing well with his machine but continues to struggle with mask leak. He obtained mask liners and feel that they help however his machine data still showing a very large leak. He is having trouble with oral dryness and running out of water in his humidifier each night. The pressure on his machine is comfortable and he is waking rested. he denies headaches, congestion, nosebleeds, dryness, aerophagia, or drowsiness while driving. He is interested in inspire as he would like to pursue alternative treatments for his sleep apnea due to persistent difficulty with mask leak and comfort. [facility]    [place] - Total score: 2    Social History     Socioeconomic History    Marital status:      Spouse name: Not on file    Number of children: Not on file    Years of education: Not on file    Highest education level: Not on file   Occupational History    Not on file   Tobacco Use    Smoking status: Former Smoker     Packs/day: 1.00     Years: 40.00     Pack years: 40.00     Start date: 0     Quit date: 2020     Years since quittin.0    Smokeless tobacco: Never Used    Tobacco comment: trying to quit   Vaping Use    Vaping Use: Never used   Substance and Sexual Activity    Alcohol use: Yes     Alcohol/week: 1.0 standard drink     Types: 1 Shots of liquor per week     Comment: weekly or less    Drug use: Not on file    Sexual activity: Yes     Partners: Female   Other Topics Concern    Not on file   Social History Narrative    Not on file     Social Determinants of Health     Financial Resource Strain:     Difficulty of Paying Living Expenses: Not on file   Food Insecurity:     Worried About Running Out of Food in the Last Year: Not on file    Desiree of Food in the Last Year: Not on file   Transportation Needs:     Lack of Transportation (Medical):  Not on file    Lack of Transportation (Non-Medical):  Not on file   Physical Activity:     Days of Exercise per Week: Not on file    Minutes of Exercise per Session: Not on file   Stress:     Feeling of Stress : Not on file   Social Connections:     Frequency of Communication with Friends and Family: Not on file    Frequency of Social Gatherings with Friends and Family: Not on file    Attends Religion Services: Not on file    Active Member of Clubs or Organizations: Not on file    Attends Club or Organization Meetings: Not on file    Marital Status: Not on file   Intimate Partner Violence:     Fear of Current or Ex-Partner: Not on file    Emotionally Abused: Not on file    Physically Abused: Not on file    Sexually Abused: Not on file   Housing Stability:     Unable to Pay for Housing in the Last Year: Not on file    Number of Places Lived in the Last Year: Not on file    Unstable Housing in the Last Year: Not on file       Current Outpatient Medications   Medication Instructions    Accu-Chek FastClix Lancets MISC USE AS DIRECTED TWICE DAILY    aspirin 81 mg, Oral    blood glucose monitor kit and supplies Check sugars BID and prn    blood glucose test strips (ACCU-CHEK GUIDE) strip TEST TWICE DAILY AND AS NEEDED FOR SYMPTOMS OF IRREGULAR BLOOD GLUCOSE    carvedilol (COREG) 6.25 mg, Oral, 2 TIMES DAILY    FARXIGA 10 MG tablet TAKE 1 TABLET BY MOUTH EVERY MORNING    glipiZIDE (GLUCOTROL XL) 5 mg, Oral, DAILY    losartan (COZAAR) 50 mg, Oral, DAILY    metFORMIN (GLUCOPHAGE-XR) 1,500 mg, Oral, DAILY WITH BREAKFAST    omeprazole (PRILOSEC) 20 mg, Oral, DAILY    ranolazine (RANEXA) 500 MG extended release tablet TAKE 1 TABLET BY MOUTH TWICE DAILY    rosuvastatin (CRESTOR) 20 mg, Oral, DAILY    TRULICITY 3 RY/3.7GZ SOPN INJECT 3MG INTO THE SKIN ONCE A WEEK          Vitals:  Weight BMI   Wt Readings from Last 3 Encounters:   06/29/22 255 lb 6.4 oz (115.8 kg)   06/24/22 250 lb (113.4 kg)   03/23/22 252 lb 6.4 oz (114.5 kg)    Body mass index is 31.92 kg/m².      BP HR SaO2   BP Readings from Last 3 Encounters:   06/29/22 (!) 98/52   06/24/22 96/60   03/23/22 92/60    Pulse Readings from Last 3 Encounters:   06/29/22 78   06/24/22 76   03/23/22 80    SpO2 Readings from Last 3 Encounters:   06/29/22 97%   06/24/22 96%   03/23/22 95%        Electronically signed by EBEN Suarez on 6/29/2022 at 7:21 PM

## 2022-06-29 NOTE — ASSESSMENT & PLAN NOTE
Chronic-Stable: Reviewed and analyzed results of physiologic download from patient's machine and reviewed with patient. Supplies and parts as needed for his machine. These are medically necessary. Limit caffeine use after 3pm. Based on the analyzed data will continue with current settings. Encouraged him to continue working with his equipment company on mask fit to help control large mask leak. Discussed controlling mask leak will help with oral dryness and water consumption from his machine and humidifier. Discussed alternative treatments for his MARCELLUS including inspire. At this time patient would like to be evaluated for [facility]. Will place consult to Dr. Ellen Singer for consultation for [facility]. Recommended patient schedule a follow-up appointment with our office in 3 months to see his how he is doing with mask fit. Patient declines at this time and reports he would prefer to hold off at this time until more information is obtained about Inspire. Encouraged the patient to call our office with any questions or concerns.

## 2022-07-05 ENCOUNTER — TELEPHONE (OUTPATIENT)
Dept: INTERNAL MEDICINE CLINIC | Age: 67
End: 2022-07-05

## 2022-07-05 DIAGNOSIS — M25.512 ACUTE PAIN OF LEFT SHOULDER: Primary | ICD-10-CM

## 2022-07-05 NOTE — TELEPHONE ENCOUNTER
Patient's wife, Mine Barnhart is requesting referral for patient to see orthopedic physician for his left shoulder. She states this was discussed at 6/24/22 visit. Referral to Dr Meron Randhawa fax #[phone].

## 2022-07-11 ENCOUNTER — TELEPHONE (OUTPATIENT)
Dept: PULMONOLOGY | Age: 67
End: 2022-07-11

## 2022-07-11 DIAGNOSIS — G47.33 OBSTRUCTIVE SLEEP APNEA SYNDROME: Primary | ICD-10-CM

## 2022-07-11 NOTE — TELEPHONE ENCOUNTER
Patient called stating that they were referred for [facility]. Explained to the patient that message would be sent to Dr. Rebekah Nair for review and our office will be in contact. Patient stated that they are going to be out of town the whole month of July and would appreciate a call back as soon as possible.

## 2022-07-18 NOTE — TELEPHONE ENCOUNTER
Called and left a message for the patient to call me back either when he is out of town or when he gets back in August

## 2022-07-21 ENCOUNTER — TELEPHONE (OUTPATIENT)
Dept: INTERNAL MEDICINE CLINIC | Age: 67
End: 2022-07-21

## 2022-07-21 NOTE — TELEPHONE ENCOUNTER
----- Message from Peggy Chatterjee sent at 7/21/2022  3:21 PM EDT -----  Subject: Results Request    QUESTIONS  Results: CT HEAD WO CONTRAST;  Ordered by: Mabel Garvin   Date Performed: 2022-07-10  ---------------------------------------------------------------------------  --------------  Simba TO    [phone]; OK to leave message on voicemail  ---------------------------------------------------------------------------  --------------

## 2022-07-21 NOTE — TELEPHONE ENCOUNTER
CT of head does show some MILD  changes associated with HTN, DM, and high cholesterol called \"white matter ischemic changes\". No evidence of previous strokes. No tumors. Brain is normal sized. No findings that would explain his memory difficulties.   saw

## 2022-07-22 NOTE — TELEPHONE ENCOUNTER
I talked with the wife and patient over the phone went over the plan about the inspire implantation and how it would take place  He has the phone number for Dr. Josh Coronado  He will call once he gets back  He will be coming back into town in August, next week  Right now in [place]  Is aware of what needs to be done in the plan of how it is going to be done.   Will refer to Dr. Josh Coronado for set up of inspire work-up

## 2022-08-12 ENCOUNTER — OFFICE VISIT (OUTPATIENT)
Dept: ENT CLINIC | Age: 67
End: 2022-08-12
Payer: MEDICARE

## 2022-08-12 VITALS
DIASTOLIC BLOOD PRESSURE: 85 MMHG | WEIGHT: 250 LBS | BODY MASS INDEX: 31.08 KG/M2 | HEIGHT: 75 IN | SYSTOLIC BLOOD PRESSURE: 131 MMHG | TEMPERATURE: 96.8 F

## 2022-08-12 DIAGNOSIS — G47.33 OSA (OBSTRUCTIVE SLEEP APNEA): Primary | ICD-10-CM

## 2022-08-12 PROCEDURE — 1036F TOBACCO NON-USER: CPT | Performed by: OTOLARYNGOLOGY

## 2022-08-12 PROCEDURE — G8427 DOCREV CUR MEDS BY ELIG CLIN: HCPCS | Performed by: OTOLARYNGOLOGY

## 2022-08-12 PROCEDURE — G8417 CALC BMI ABV UP PARAM F/U: HCPCS | Performed by: OTOLARYNGOLOGY

## 2022-08-12 PROCEDURE — 3017F COLORECTAL CA SCREEN DOC REV: CPT | Performed by: OTOLARYNGOLOGY

## 2022-08-12 PROCEDURE — 1123F ACP DISCUSS/DSCN MKR DOCD: CPT | Performed by: OTOLARYNGOLOGY

## 2022-08-12 PROCEDURE — 99203 OFFICE O/P NEW LOW 30 MIN: CPT | Performed by: OTOLARYNGOLOGY

## 2022-08-12 ASSESSMENT — ENCOUNTER SYMPTOMS
FACIAL SWELLING: 0
TROUBLE SWALLOWING: 0
COUGH: 0
APNEA: 0
EYE ITCHING: 0
SHORTNESS OF BREATH: 0
SORE THROAT: 0
SINUS PRESSURE: 0
VOICE CHANGE: 0

## 2022-08-12 NOTE — LETTER
[facility]    Surgery Schedule Request Form: 08/12/22  [address], [address]    DATE OF SURGERY: 09-    TIME OF SURGERY:  7:30 AM            CONF #: ____________________       Patient Information:    Patient name: Zeny Smallwood YOB: 1955 Age/Sex:67 y.o./male    SS #:[id number]    Wt Readings from Last 1 Encounters:   08/12/22 250 lb (113.4 kg)       Telephone Information:   Mobile [phone]     Home [phone]     Surgeon & Procedure Information:     Lead surgeon: Lamine Romero Co-Surgeon: no  Phone: [phone] Fax: [phone]  PCP: Anayeli Thibodeaux DO    Diagnosis: obstructive sleep apnea G47.33  Location: [address] [place] [address]    Procedure name/CPT: drug induced sleep endoscopy   [address]    Procedure length: 20 minutes Anesthesia: Endo Sedation    Special Equipment: no    Patient Status: SDS (OP)    COVID Vacs: yes   Primary Payor Plan: [facility]  Member ID: [id number]   Subscriber name: Jonah Dallas    [] Implement attached clinical orders for patient.       Electronically signed by Timothy Vaughn DO on 8/12/2022 at 2:36 PM

## 2022-08-12 NOTE — PROGRESS NOTES
[address], [address], [address] [place] [address]  P: [phone]       Patient     Bijan Trujillo      ChiefComplaint     Chief Complaint   Patient presents with    New Patient     Patient has sleep apnea and is currently using a Bypap. Patient would like to discuss Inspire       History of Present Illness     Missy Easton is a 69-year-old male here today with his wife for evaluation of obstructive sleep apnea and discussion of inspire. Diagnosed with sleep apnea 3/20/2021 through home sleep test.  Since then has tried a variety of masks with his CPAP/BiPAP and has had constant leak issues. Has even tried a filter with some improvement but not resolution. He is frustrated and would like to proceed with inspire. Had insurance mandated HST on 3/24/21 which showed and RADHA-25.2/hr (using 4% rule) and low sat-74% with time below 88% of 426 min. Past Medical History     Past Medical History:   Diagnosis Date    CAD (coronary artery disease)     Hyperlipidemia     Hypertension     Obstructive sleep apnea syndrome 2021    Type 2 diabetes mellitus without complication [facility])        Past Surgical History     Past Surgical History:   Procedure Laterality Date    FOOT SURGERY Right 2020    PTCA  210    PTCA/Stent x 1 vessel. following MI       Family History     Family History   Problem Relation Age of Onset    Breast Cancer Mother 52    Heart Attack Father 64    Hypertension Father     Cancer Sister 43    Drug Abuse Sister 62            Heart Attack Maternal Grandfather 52    Heart Attack Son 29       Social History     Social History     Tobacco Use    Smoking status: Former     Packs/day: 1.00     Years: 40.00     Pack years: 40.00     Types: Cigarettes     Start date: 0     Quit date: 2020     Years since quittin.1    Smokeless tobacco: Never   Vaping Use    Vaping Use: Never used   Substance Use Topics    Alcohol use:  Yes     Alcohol/week: 1.0 standard drink     Types: 1 Shots of liquor per week     Comment: weekly or less        Allergies     Allergies   Allergen Reactions    Influenza Vaccines Rash    Lisinopril Rash       Medications     Current Outpatient Medications   Medication Sig Dispense Refill    losartan (COZAAR) 50 MG tablet Take 1 tablet by mouth daily 90 tablet 1    TRULICITY 3 VS/9.9DZ SOPN INJECT 3MG INTO THE SKIN ONCE A WEEK 2 mL 3    Accu-Chek FastClix Lancets MISC USE AS DIRECTED TWICE DAILY 102 each 5    blood glucose test strips (ACCU-CHEK GUIDE) strip TEST TWICE DAILY AND AS NEEDED FOR SYMPTOMS OF IRREGULAR BLOOD GLUCOSE 100 strip 5    ranolazine (RANEXA) 500 MG extended release tablet TAKE 1 TABLET BY MOUTH TWICE DAILY 180 tablet 3    rosuvastatin (CRESTOR) 20 MG tablet TAKE 1 TABLET BY MOUTH DAILY 90 tablet 3    FARXIGA 10 MG tablet TAKE 1 TABLET BY MOUTH EVERY MORNING 90 tablet 1    carvedilol (COREG) 6.25 MG tablet Take 1 tablet by mouth 2 times daily 180 tablet 3    omeprazole (PRILOSEC) 20 MG delayed release capsule Take 1 capsule by mouth Daily 90 capsule 3    glipiZIDE (GLUCOTROL XL) 5 MG extended release tablet TAKE 1 TABLET BY MOUTH DAILY 90 tablet 3    metFORMIN (GLUCOPHAGE-XR) 750 MG extended release tablet Take 2 tablets by mouth daily (with breakfast) 180 tablet 3    blood glucose monitor kit and supplies Check sugars BID and prn 1 kit 0    aspirin 81 MG chewable tablet Take 81 mg by mouth       No current facility-administered medications for this visit. Review of Systems     Review of Systems   Constitutional:  Negative for appetite change, chills, fatigue, fever and unexpected weight change. HENT:  Negative for congestion, ear discharge, ear pain, facial swelling, hearing loss, nosebleeds, postnasal drip, sinus pressure, sneezing, sore throat, tinnitus, trouble swallowing and voice change. Eyes:  Negative for itching. Respiratory:  Negative for apnea, cough and shortness of breath.     Endocrine: Negative for cold intolerance and heat intolerance. Musculoskeletal:  Negative for myalgias and neck pain. Skin:  Negative for rash. Allergic/Immunologic: Negative for environmental allergies. Neurological:  Negative for dizziness and headaches. Psychiatric/Behavioral:  Negative for confusion, decreased concentration and sleep disturbance. PhysicalExam     Vitals:    08/12/22 1412   BP: 131/85   Site: Right Upper Arm   Position: Sitting   Temp: 96.8 °F (36 °C)   Weight: 250 lb (113.4 kg)   Height: 6' 3\" (1.905 m)       Physical Exam  Constitutional:       General: He is not in acute distress. Appearance: He is well-developed. HENT:      Head: Normocephalic and atraumatic. Right Ear: Tympanic membrane, ear canal and external ear normal. No drainage. No middle ear effusion. Tympanic membrane is not bulging. Tympanic membrane has normal mobility. Left Ear: Tympanic membrane, ear canal and external ear normal. No drainage. No middle ear effusion. Tympanic membrane is not bulging. Tympanic membrane has normal mobility. Nose: No mucosal edema or rhinorrhea. Mouth/Throat:      Lips: Pink. Mouth: Mucous membranes are moist.      Tongue: No lesions. Palate: No mass. Pharynx: Uvula midline. Eyes:      Pupils: Pupils are equal, round, and reactive to light. Neck:      Thyroid: No thyroid mass or thyromegaly. Trachea: Trachea and phonation normal.   Cardiovascular:      Pulses: Normal pulses. Pulmonary:      Effort: Pulmonary effort is normal. No accessory muscle usage or respiratory distress. Breath sounds: No stridor. Musculoskeletal:      Cervical back: Full passive range of motion without pain. Lymphadenopathy:      Head:      Right side of head: No submental or submandibular adenopathy. Left side of head: No submental or submandibular adenopathy. Cervical: No cervical adenopathy.       Right cervical: No superficial, deep or posterior cervical adenopathy. Left cervical: No superficial, deep or posterior cervical adenopathy. Skin:     General: Skin is warm and dry. Neurological:      Mental Status: He is alert and oriented to person, place, and time. Cranial Nerves: No cranial nerve deficit. Coordination: Coordination normal.      Gait: Gait normal.   Psychiatric:         Thought Content: Thought content normal.         Assessment and Plan     1. MARCELLUS (obstructive sleep apnea)  -BMI 31, has tried and failed CPAP for greater than 3 months secondary to constant leaks despite multiple mask adjustments and of trying a variety of masks  -Explained sleep endoscopy and need to determine if he is a candidate  -Also discussed implantation risk benefits alternatives risk discussed pneumothorax, postoperative infection requiring explantation and neuropraxia      Malu Charles, DO  8/12/22      Portions of this note were dictated using Dragon.  There may be linguistic errors secondary to the use of this program.

## 2022-08-12 NOTE — Clinical Note
I noticed this sleep study from 2/24/21 was scored using RADHA, not AHI- can you re score him too please- it does say they used the 4% rule but I couldn't find AHI.

## 2022-08-15 RX ORDER — DAPAGLIFLOZIN 10 MG/1
TABLET, FILM COATED ORAL
Qty: 90 TABLET | Refills: 3 | Status: SHIPPED | OUTPATIENT
Start: 2022-08-15

## 2022-08-25 ENCOUNTER — TELEPHONE (OUTPATIENT)
Dept: ENT CLINIC | Age: 67
End: 2022-08-25

## 2022-08-25 NOTE — TELEPHONE ENCOUNTER
Patient is getting dise procedure 9/6 - another surgeon wanting to do surgery on his arm and have him under general anesthesia. Would that be safe for patient.  Please advise Sigrid Manjarrez (spouse) [phone]

## 2022-08-26 NOTE — PROGRESS NOTES
Merle Morton. Age 75 y.o.    male    1955    N [id number]    9/6/2022  Arrival Time_____________  OR Time____________[address]     Procedure(s):  DRUG INDUCED SLEEP ENDOSCOPY                      Monitor Anesthesia Care    Surgeon(s):  Melani Codding, DO       Phone [phone] ([place])     [address]  Cell         Work  _____________________________________________________________________  _____________________________________________________________________  _____________________________________________________________________  _____________________________________________________________________  _____________________________________________________________________    PCP _____________________________ Phone_________________     H&P__________________Bringing      Chart            Epic   DOS      Called________  EKG__________________Bringing      Chart            Epic   DOS      Called________  LAB__________________ Bringing      Chart            Epic   DOS      Called________  Cardiac Clearance_______Bringing      Chart            Epic      DOS      Called________    Cardiologist________________________ Phone___________________________    ? Caodaism concerns / Waiver on Chart            PAT Communications________________  ? Pre-op Instructions Given [place]          _________________________________  ? Directions to Surgery Center                          _________________________________  ? Transportation Home_______________      __________________________________  ?  Crutches/Walker__________________        __________________________________    ________Pre-op Orders   _______Transcribed    _______Wt.  ________Pharmacy          _______SCD  ______VTE     ______TED Con [place]  _______  Surgery Consent    _______  Anesthesia Consent         COVID DATE______________LOCATION________________ RESULT__________

## 2022-08-29 NOTE — PROGRESS NOTES

## 2022-08-30 ENCOUNTER — TELEPHONE (OUTPATIENT)
Dept: CARDIOLOGY CLINIC | Age: 67
End: 2022-08-30

## 2022-08-30 NOTE — TELEPHONE ENCOUNTER
Call placed to patients wife to let her know there is not an issue having the surgery on his arm after having the DISE procedure done as he will be under a light anesthetic for the DISE procedure.

## 2022-08-30 NOTE — Clinical Note
[address] Cardiology - [place]  [address]  [address]  Phone: [phone]  Fax: [phone]    Jose M Russo MD        August 31, 2022     Patient: Jordon Arana. YOB: 1955   Date of Visit: 8/30/2022       To Whom It May Concern: It is my medical opinion that [facility] {participation release, (activity restriction):36312}. If you have any questions or concerns, please don't hesitate to call.     Sincerely,        Jose M Russo MD

## 2022-08-30 NOTE — Clinical Note
[address] Cardiology [address] [address]  Phone: [phone]  Fax: [phone]    Rigo Moffett MD        August 31, 2022     Patient: Raine Medina. YOB: 1955   Date of Visit: 8/30/2022       To Whom It May Concern: It is my medical opinion that [facility] {Work release (duty restriction):78855}. If you have any questions or concerns, please don't hesitate to call.     Sincerely,        Rigo Moffett MD

## 2022-08-30 NOTE — TELEPHONE ENCOUNTER
CARDIAC CLEARANCE     What type of procedure are you having? Ulnar Nerve & Carpal Tunnel release on left wrist    Which physician is performing your procedure? Dr. Alejandro Correa    When is your procedure scheduled for? Sept 8    Where are you having this procedure? [facility]    Are you taking Blood Thinners? yes   If so what? (Name/dose/frequesncy)  aspirin 81 MG chewable tablet  Take 81 mg by mouth daily     Does the surgeon want you to stop your blood thinner? If so for how long?   Yes  for three days prior    Phone Number and Contact Name for Physicians office:  [employer] [phone]    Fax number to send information:  [phone]

## 2022-08-31 NOTE — TELEPHONE ENCOUNTER
Spoke to patient. He has no cardiac symptoms including chest pain or shortness of breath and is able to climb two flights of steps without much trouble. He has had no change in cardiac condition since his last office visit with Dr. Caleb Rapp in December 2021. Discussed with Dr. Dawn Vidal (covering for Dr. Caleb Rapp). Patient can proceed with left wrist surgery by Dr. Wallace Harden at low to moderate cardiac risk. He prefers that aspirin be continued, but it aspirin needs to be held due to concerns for increased bleeding, patient can hold aspirin for three days only and resume as soon as possible after surgery. This was discussed with patient and he voiced understanding.

## 2022-09-07 RX ORDER — DULAGLUTIDE 3 MG/.5ML
INJECTION, SOLUTION SUBCUTANEOUS
Qty: 12 ADJUSTABLE DOSE PRE-FILLED PEN SYRINGE | Refills: 3 | Status: SHIPPED | OUTPATIENT
Start: 2022-09-07

## 2022-09-09 RX ORDER — METFORMIN HYDROCHLORIDE 750 MG/1
TABLET, EXTENDED RELEASE ORAL
Qty: 180 TABLET | Refills: 3 | Status: SHIPPED | OUTPATIENT
Start: 2022-09-09

## 2022-09-12 ENCOUNTER — ANESTHESIA EVENT (OUTPATIENT)
Dept: OPERATING ROOM | Age: 67
End: 2022-09-12
Payer: MEDICARE

## 2022-09-12 ENCOUNTER — TELEPHONE (OUTPATIENT)
Dept: ENT CLINIC | Age: 67
End: 2022-09-12

## 2022-09-12 NOTE — TELEPHONE ENCOUNTER
Call placed to patient to remind them of DISE on 09/13/2022 with a start time of 0715 and an arrival time of 0600.      Patient did not answer my call

## 2022-09-13 ENCOUNTER — HOSPITAL ENCOUNTER (OUTPATIENT)
Age: 67
Setting detail: OUTPATIENT SURGERY
Discharge: HOME | End: 2022-09-13
Attending: OTOLARYNGOLOGY | Admitting: OTOLARYNGOLOGY
Payer: MEDICARE

## 2022-09-13 ENCOUNTER — ANESTHESIA (OUTPATIENT)
Dept: OPERATING ROOM | Age: 67
End: 2022-09-13
Payer: MEDICARE

## 2022-09-13 VITALS
RESPIRATION RATE: 16 BRPM | OXYGEN SATURATION: 94 % | BODY MASS INDEX: 31.08 KG/M2 | HEART RATE: 75 BPM | DIASTOLIC BLOOD PRESSURE: 82 MMHG | HEIGHT: 75 IN | SYSTOLIC BLOOD PRESSURE: 114 MMHG | TEMPERATURE: 96.8 F | WEIGHT: 250 LBS

## 2022-09-13 LAB
GLUCOSE BLD-MCNC: 121 MG/DL (ref 70–99)
GLUCOSE BLD-MCNC: 134 MG/DL (ref 70–99)
PERFORMED ON: ABNORMAL
PERFORMED ON: ABNORMAL

## 2022-09-13 PROCEDURE — 3700000001 HC ADD 15 MINUTES (ANESTHESIA): Performed by: OTOLARYNGOLOGY

## 2022-09-13 PROCEDURE — 6370000000 HC RX 637 (ALT 250 FOR IP): Performed by: OTOLARYNGOLOGY

## 2022-09-13 PROCEDURE — 3600000014 HC SURGERY LEVEL 4 ADDTL 15MIN: Performed by: OTOLARYNGOLOGY

## 2022-09-13 PROCEDURE — 7100000010 HC PHASE II RECOVERY - FIRST 15 MIN: Performed by: OTOLARYNGOLOGY

## 2022-09-13 PROCEDURE — 3600000004 HC SURGERY LEVEL 4 BASE: Performed by: OTOLARYNGOLOGY

## 2022-09-13 PROCEDURE — 2580000003 HC RX 258: Performed by: NURSE ANESTHETIST, CERTIFIED REGISTERED

## 2022-09-13 PROCEDURE — 6360000002 HC RX W HCPCS: Performed by: NURSE ANESTHETIST, CERTIFIED REGISTERED

## 2022-09-13 PROCEDURE — 2580000003 HC RX 258: Performed by: ANESTHESIOLOGY

## 2022-09-13 PROCEDURE — 2500000003 HC RX 250 WO HCPCS: Performed by: OTOLARYNGOLOGY

## 2022-09-13 PROCEDURE — 3700000000 HC ANESTHESIA ATTENDED CARE: Performed by: OTOLARYNGOLOGY

## 2022-09-13 PROCEDURE — 7100000011 HC PHASE II RECOVERY - ADDTL 15 MIN: Performed by: OTOLARYNGOLOGY

## 2022-09-13 PROCEDURE — 42975 DISE EVAL SLP DO BRTH FLX DX: CPT | Performed by: OTOLARYNGOLOGY

## 2022-09-13 PROCEDURE — 2709999900 HC NON-CHARGEABLE SUPPLY: Performed by: OTOLARYNGOLOGY

## 2022-09-13 RX ORDER — LIDOCAINE HYDROCHLORIDE 40 MG/ML
INJECTION, SOLUTION RETROBULBAR; TOPICAL PRN
Status: DISCONTINUED | OUTPATIENT
Start: 2022-09-13 | End: 2022-09-13 | Stop reason: ALTCHOICE

## 2022-09-13 RX ORDER — SODIUM CHLORIDE 0.9 % (FLUSH) 0.9 %
5-40 SYRINGE (ML) INJECTION EVERY 12 HOURS SCHEDULED
Status: DISCONTINUED | OUTPATIENT
Start: 2022-09-13 | End: 2022-09-13 | Stop reason: HOSPADM

## 2022-09-13 RX ORDER — ACETAMINOPHEN 500 MG
1000 TABLET ORAL
Status: DISCONTINUED | OUTPATIENT
Start: 2022-09-13 | End: 2022-09-13 | Stop reason: HOSPADM

## 2022-09-13 RX ORDER — SODIUM CHLORIDE, SODIUM LACTATE, POTASSIUM CHLORIDE, CALCIUM CHLORIDE 600; 310; 30; 20 MG/100ML; MG/100ML; MG/100ML; MG/100ML
INJECTION, SOLUTION INTRAVENOUS CONTINUOUS PRN
Status: DISCONTINUED | OUTPATIENT
Start: 2022-09-13 | End: 2022-09-13 | Stop reason: SDUPTHER

## 2022-09-13 RX ORDER — IBUPROFEN 200 MG
400 TABLET ORAL ONCE
Status: DISCONTINUED | OUTPATIENT
Start: 2022-09-13 | End: 2022-09-13 | Stop reason: HOSPADM

## 2022-09-13 RX ORDER — SODIUM CHLORIDE, SODIUM LACTATE, POTASSIUM CHLORIDE, CALCIUM CHLORIDE 600; 310; 30; 20 MG/100ML; MG/100ML; MG/100ML; MG/100ML
INJECTION, SOLUTION INTRAVENOUS CONTINUOUS
Status: DISCONTINUED | OUTPATIENT
Start: 2022-09-13 | End: 2022-09-13 | Stop reason: HOSPADM

## 2022-09-13 RX ORDER — LIDOCAINE HYDROCHLORIDE 10 MG/ML
0.3 INJECTION, SOLUTION EPIDURAL; INFILTRATION; INTRACAUDAL; PERINEURAL
Status: DISCONTINUED | OUTPATIENT
Start: 2022-09-13 | End: 2022-09-13 | Stop reason: HOSPADM

## 2022-09-13 RX ORDER — OXYMETAZOLINE HYDROCHLORIDE 0.05 G/100ML
SPRAY NASAL PRN
Status: DISCONTINUED | OUTPATIENT
Start: 2022-09-13 | End: 2022-09-13 | Stop reason: ALTCHOICE

## 2022-09-13 RX ORDER — DROPERIDOL 2.5 MG/ML
0.62 INJECTION, SOLUTION INTRAMUSCULAR; INTRAVENOUS
Status: DISCONTINUED | OUTPATIENT
Start: 2022-09-13 | End: 2022-09-13 | Stop reason: HOSPADM

## 2022-09-13 RX ORDER — ONDANSETRON 2 MG/ML
4 INJECTION INTRAMUSCULAR; INTRAVENOUS
Status: DISCONTINUED | OUTPATIENT
Start: 2022-09-13 | End: 2022-09-13 | Stop reason: HOSPADM

## 2022-09-13 RX ORDER — SODIUM CHLORIDE 0.9 % (FLUSH) 0.9 %
5-40 SYRINGE (ML) INJECTION PRN
Status: DISCONTINUED | OUTPATIENT
Start: 2022-09-13 | End: 2022-09-13 | Stop reason: HOSPADM

## 2022-09-13 RX ORDER — MEPERIDINE HYDROCHLORIDE 50 MG/ML
12.5 INJECTION INTRAMUSCULAR; INTRAVENOUS; SUBCUTANEOUS EVERY 5 MIN PRN
Status: DISCONTINUED | OUTPATIENT
Start: 2022-09-13 | End: 2022-09-13 | Stop reason: HOSPADM

## 2022-09-13 RX ORDER — SODIUM CHLORIDE 9 MG/ML
INJECTION, SOLUTION INTRAVENOUS PRN
Status: DISCONTINUED | OUTPATIENT
Start: 2022-09-13 | End: 2022-09-13 | Stop reason: HOSPADM

## 2022-09-13 RX ORDER — PROPOFOL 10 MG/ML
INJECTION, EMULSION INTRAVENOUS PRN
Status: DISCONTINUED | OUTPATIENT
Start: 2022-09-13 | End: 2022-09-13 | Stop reason: SDUPTHER

## 2022-09-13 RX ADMIN — SODIUM CHLORIDE, SODIUM LACTATE, POTASSIUM CHLORIDE, AND CALCIUM CHLORIDE: .6; .31; .03; .02 INJECTION, SOLUTION INTRAVENOUS at 07:15

## 2022-09-13 RX ADMIN — PROPOFOL 100 MG: 10 INJECTION, EMULSION INTRAVENOUS at 07:17

## 2022-09-13 RX ADMIN — PROPOFOL 10 MG: 10 INJECTION, EMULSION INTRAVENOUS at 07:18

## 2022-09-13 RX ADMIN — PROPOFOL 125 MG: 10 INJECTION, EMULSION INTRAVENOUS at 07:19

## 2022-09-13 RX ADMIN — SODIUM CHLORIDE, POTASSIUM CHLORIDE, SODIUM LACTATE AND CALCIUM CHLORIDE: 600; 310; 30; 20 INJECTION, SOLUTION INTRAVENOUS at 06:31

## 2022-09-13 RX ADMIN — PROPOFOL 175 MG: 10 INJECTION, EMULSION INTRAVENOUS at 07:23

## 2022-09-13 RX ADMIN — PROPOFOL 150 MG: 10 INJECTION, EMULSION INTRAVENOUS at 07:21

## 2022-09-13 ASSESSMENT — PAIN SCALES - GENERAL
PAINLEVEL_OUTOF10: 0

## 2022-09-13 ASSESSMENT — PAIN - FUNCTIONAL ASSESSMENT: PAIN_FUNCTIONAL_ASSESSMENT: 0-10

## 2022-09-13 NOTE — ANESTHESIA POSTPROCEDURE EVALUATION
Department of Anesthesiology  Postprocedure Note    Patient: Rosita Dela Cruz. MRN: [id number]  YOB: 1955  Date of evaluation: 9/13/2022      Procedure Summary     Date: 09/13/22 Room / Location: [address] Leigh Ann Mckeon 01 / [facility]    Anesthesia Start: Cleda Drafts Anesthesia Stop: 4419    Procedure: DRUG INDUCED SLEEP ENDOSCOPY (Throat) Diagnosis:       Obstructive sleep apnea (adult) (pediatric)      (OBSTRUCTIVE SLEEP APNEA)    Surgeons: Stephanie Bravo DO Responsible Provider: Junior Cottrell MD    Anesthesia Type: MAC ASA Status: 3          Anesthesia Type: No value filed.     Houston Phase I: Houston Score: 10    Houston Phase II: Houston Score: 10      Anesthesia Post Evaluation    Patient location during evaluation: PACU  Patient participation: complete - patient participated  Level of consciousness: awake and alert  Airway patency: patent  Nausea & Vomiting: no nausea and no vomiting  Complications: no  Cardiovascular status: hemodynamically stable  Respiratory status: acceptable, room air and spontaneous ventilation  Hydration status: stable  Comments: --------------------            09/13/22               0800     --------------------   BP:       114/82     Pulse:      75       Resp:       16       Temp:                SpO2:      94%      --------------------

## 2022-09-13 NOTE — H&P
Casey Elliott 89, [address], [address] [place] [address]  P: [phone]        Patient      Fabiana Paredes       ChiefComplaint           Chief Complaint   Patient presents with    New Patient       Patient has sleep apnea and is currently using a Bypap. Patient would like to discuss Inspire         History of Present Illness      Noble Otoole is a 68-year-old male here today for DISE     Past Medical History      Past Medical History        Past Medical History:   Diagnosis Date    CAD (coronary artery disease)      Hyperlipidemia      Hypertension      Obstructive sleep apnea syndrome 2021    Type 2 diabetes mellitus without complication [facility])              Past Surgical History      Past Surgical History         Past Surgical History:   Procedure Laterality Date    FOOT SURGERY Right 2020    PTCA        PTCA/Stent x 1 vessel. following MI            Family History      Family History         Family History   Problem Relation Age of Onset    Breast Cancer Mother 52    Heart Attack Father 64    Hypertension Father      Cancer Sister 43    Drug Abuse Sister 62             Heart Attack Maternal Grandfather 52    Heart Attack Son 29            Social History      Social History            Tobacco Use    Smoking status: Former       Packs/day: 1.00       Years: 40.00       Pack years: 40.00       Types: Cigarettes       Start date: 0       Quit date: 2020       Years since quittin.1    Smokeless tobacco: Never   Vaping Use    Vaping Use: Never used   Substance Use Topics    Alcohol use:  Yes       Alcohol/week: 1.0 standard drink       Types: 1 Shots of liquor per week       Comment: weekly or less         Allergies           Allergies   Allergen Reactions    Influenza Vaccines Rash    Lisinopril Rash         Medications      Current Facility-Administered Medications          Current Outpatient Medications   Medication Sig Dispense Refill    losartan (COZAAR) 50 MG tablet Take 1 tablet by mouth daily 90 tablet 1    TRULICITY 3 CE/6.5LJ SOPN INJECT 3MG INTO THE SKIN ONCE A WEEK 2 mL 3    Accu-Chek FastClix Lancets MISC USE AS DIRECTED TWICE DAILY 102 each 5    blood glucose test strips (ACCU-CHEK GUIDE) strip TEST TWICE DAILY AND AS NEEDED FOR SYMPTOMS OF IRREGULAR BLOOD GLUCOSE 100 strip 5    ranolazine (RANEXA) 500 MG extended release tablet TAKE 1 TABLET BY MOUTH TWICE DAILY 180 tablet 3    rosuvastatin (CRESTOR) 20 MG tablet TAKE 1 TABLET BY MOUTH DAILY 90 tablet 3    FARXIGA 10 MG tablet TAKE 1 TABLET BY MOUTH EVERY MORNING 90 tablet 1    carvedilol (COREG) 6.25 MG tablet Take 1 tablet by mouth 2 times daily 180 tablet 3    omeprazole (PRILOSEC) 20 MG delayed release capsule Take 1 capsule by mouth Daily 90 capsule 3    glipiZIDE (GLUCOTROL XL) 5 MG extended release tablet TAKE 1 TABLET BY MOUTH DAILY 90 tablet 3    metFORMIN (GLUCOPHAGE-XR) 750 MG extended release tablet Take 2 tablets by mouth daily (with breakfast) 180 tablet 3    blood glucose monitor kit and supplies Check sugars BID and prn 1 kit 0    aspirin 81 MG chewable tablet Take 81 mg by mouth          No current facility-administered medications for this visit. Review of Systems      Review of Systems   Constitutional:  Negative for appetite change, chills, fatigue, fever and unexpected weight change. HENT:  Negative for congestion, ear discharge, ear pain, facial swelling, hearing loss, nosebleeds, postnasal drip, sinus pressure, sneezing, sore throat, tinnitus, trouble swallowing and voice change. Eyes:  Negative for itching. Respiratory:  Negative for apnea, cough and shortness of breath. Endocrine: Negative for cold intolerance and heat intolerance. Musculoskeletal:  Negative for myalgias and neck pain. Skin:  Negative for rash. Allergic/Immunologic: Negative for environmental allergies. Neurological:  Negative for dizziness and headaches.    Psychiatric/Behavioral: Negative for confusion, decreased concentration and sleep disturbance. PhysicalExam      Vitals       Vitals:     08/12/22 1412   BP: 131/85   Site: Right Upper Arm   Position: Sitting   Temp: 96.8 °F (36 °C)   Weight: 250 lb (113.4 kg)   Height: 6' 3\" (1.905 m)            Physical Exam  Constitutional:       General: He is not in acute distress. Appearance: He is well-developed. HENT:      Head: Normocephalic and atraumatic. Right Ear: Tympanic membrane, ear canal and external ear normal. No drainage. No middle ear effusion. Tympanic membrane is not bulging. Tympanic membrane has normal mobility. Left Ear: Tympanic membrane, ear canal and external ear normal. No drainage. No middle ear effusion. Tympanic membrane is not bulging. Tympanic membrane has normal mobility. Nose: No mucosal edema or rhinorrhea. Mouth/Throat:      Lips: Pink. Mouth: Mucous membranes are moist.      Tongue: No lesions. Palate: No mass. Pharynx: Uvula midline. Eyes:      Pupils: Pupils are equal, round, and reactive to light. Neck:      Thyroid: No thyroid mass or thyromegaly. Trachea: Trachea and phonation normal.   Cardiovascular:      Pulses: Normal pulses. Pulmonary:      Effort: Pulmonary effort is normal. No accessory muscle usage or respiratory distress. Breath sounds: No stridor. Musculoskeletal:      Cervical back: Full passive range of motion without pain. Lymphadenopathy:      Head:      Right side of head: No submental or submandibular adenopathy. Left side of head: No submental or submandibular adenopathy. Cervical: No cervical adenopathy. Right cervical: No superficial, deep or posterior cervical adenopathy. Left cervical: No superficial, deep or posterior cervical adenopathy. Skin:     General: Skin is warm and dry. Neurological:      Mental Status: He is alert and oriented to person, place, and time.       Cranial Nerves: No cranial nerve deficit. Coordination: Coordination normal.      Gait: Gait normal.   Psychiatric:         Thought Content: Thought content normal.            Assessment and Plan      1.  MARCELLUS (obstructive sleep apnea)  -to OR for sleep endo

## 2022-09-13 NOTE — BRIEF OP NOTE
Brief Postoperative Note      Patient: Kat Montanez.   YOB: 1955  MRN: [id number]    Date of Procedure: 9/13/2022    Pre-Op Diagnosis: OBSTRUCTIVE SLEEP APNEA    Post-Op Diagnosis: Same       Procedure(s):  DRUG INDUCED SLEEP ENDOSCOPY    Surgeon(s):  Caitlin Figueroa DO    Assistant:  Surgical Assistant: Alley Vaca    Anesthesia: Monitor Anesthesia Care    Estimated Blood Loss (mL): Minimal    Complications: None    Specimens:   * No specimens in log *    Implants:  * No implants in log *      Drains: * No LDAs found *    Findings: anterior posterior collapse    Electronically signed by Caitlin Figueroa DO on 9/13/2022 at 7:26 AM

## 2022-09-13 NOTE — ANESTHESIA PRE PROCEDURE
Department of Anesthesiology  Preprocedure Note       Name:  Shane Wyatt. Age:  75 y.o.  :  1955                                          MRN:  [id number]         Date:  2022      Surgeon: Nan Clifford):  Freedom Bennett DO    Procedure: Procedure(s):  DRUG INDUCED SLEEP ENDOSCOPY    Medications prior to admission:   Prior to Admission medications    Medication Sig Start Date End Date Taking?  Authorizing Provider   metFORMIN (GLUCOPHAGE-XR) 750 MG extended release tablet TAKE 2 TABLETS BY MOUTH DAILY WITH BREAKFAST 22   [facility], DO   TRULICITY 3 NV/9.1SS SOPN INJECT 3MG INTO THE SKIN ONCE A WEEK 22   [facility], DO   FARXIGA 10 MG tablet TAKE 1 TABLET BY MOUTH EVERY MORNING 8/15/22   [facility], DO   losartan (COZAAR) 50 MG tablet Take 1 tablet by mouth daily 22   [facility], DO   Accu-Chek FastClix Lancets MISC USE AS DIRECTED TWICE DAILY 22   [facility], DO   blood glucose test strips (ACCU-CHEK GUIDE) strip TEST TWICE DAILY AND AS NEEDED FOR SYMPTOMS OF IRREGULAR BLOOD GLUCOSE 22   [facility], DO   ranolazine (RANEXA) 500 MG extended release tablet TAKE 1 TABLET BY MOUTH TWICE DAILY 22   [facility], DO   rosuvastatin (CRESTOR) 20 MG tablet TAKE 1 TABLET BY MOUTH DAILY 3/31/22   [facility], DO   carvedilol (COREG) 6.25 MG tablet Take 1 tablet by mouth 2 times daily 22   Vega Coats MD   omeprazole (PRILOSEC) 20 MG delayed release capsule Take 1 capsule by mouth Daily 21   [facility], DO   glipiZIDE (GLUCOTROL XL) 5 MG extended release tablet TAKE 1 TABLET BY MOUTH DAILY 10/25/21   [facility], DO   blood glucose monitor kit and supplies Check sugars BID and prn 21   [facility], DO   aspirin 81 MG chewable tablet Take 81 mg by mouth daily 13   Historical Provider, MD       Current medications:    Current Facility-Administered Medications   Medication Dose Route Frequency Provider Last Rate Last Admin    lidocaine PF 1 % injection 0.3 mL  0.3 mL IntraDERmal Once PRN Sofia Michaels MD        lactated ringers infusion   IntraVENous Continuous Sofia Michaels  mL/hr at 09/13/22 0631 New Bag at 09/13/22 0631    sodium chloride flush 0.9 % injection 5-40 mL  5-40 mL IntraVENous 2 times per day Sofia Michaels MD        sodium chloride flush 0.9 % injection 5-40 mL  5-40 mL IntraVENous PRN Sofia Michaels MD        0.9 % sodium chloride infusion   IntraVENous PRN Sofia Michaels MD        lidocaine PF 4 % injection    PRN Delmy Dome, DO   2 mL at 09/13/22 0700    oxymetazoline (AFRIN) 0.05 % nasal spray    PRN Delmy Dome, DO   2 mL at 09/13/22 0700       Allergies:     Allergies   Allergen Reactions    Influenza Vaccines Rash    Lisinopril Rash       Problem List:    Patient Active Problem List   Diagnosis Code    Coronary artery disease involving native coronary artery of native heart without angina pectoris I25.10    Essential hypertension I10    Hyperlipidemia LDL goal <70 E78.5    Palpitations R00.2    History of colon polyps Z86.010    Type 2 diabetes mellitus with microalbuminuria, without long-term current use of insulin (HCC) E11.29, R80.9    History of myocardial infarction I25.2    Tobacco use Z72.0    Sudden visual loss of left eye H53.132    Weakness of both hips R29.898    Vaccine contraindicated Z28.09    Optic neuritis H46.9    Erectile dysfunction N52.9    Obstructive sleep apnea syndrome G47.33    Non morbid obesity, unspecified obesity type E66.9    Acute pain of left shoulder M25.512    Cognitive impairment R41.89       Past Medical History:        Diagnosis Date    CAD (coronary artery disease)     Hyperlipidemia     Hypertension     Obstructive sleep apnea syndrome 5/18/2021    Type 2 diabetes mellitus without complication [facility])        Past Surgical History:        Procedure Laterality Date    COLONOSCOPY  FOOT SURGERY Right 2020    PTCA      PTCA/Stent x 1 vessel. following MI       Social History:    Social History     Tobacco Use    Smoking status: Former     Packs/day: 1.00     Years: 40.00     Pack years: 40.00     Types: Cigarettes     Start date: 0     Quit date: 2020     Years since quittin.2    Smokeless tobacco: Never   Substance Use Topics    Alcohol use: Not Currently                                Counseling given: Not Answered      Vital Signs (Current):   Vitals:    22 1439 22 0614   BP:  (!) 155/84   Pulse:  85   Resp:  16   Temp:  96.8 °F (36 °C)   TempSrc:  Temporal   SpO2:  95%   Weight: 250 lb (113.4 kg) 250 lb (113.4 kg)   Height: 6' 3\" (1.905 m) 6' 3\" (1.905 m)                                              BP Readings from Last 3 Encounters:   22 (!) 155/84   22 131/85   22 (!) 98/52       NPO Status: Time of last liquid consumption:                         Time of last solid consumption:                         Date of last liquid consumption: 22                        Date of last solid food consumption: 22    BMI:   Wt Readings from Last 3 Encounters:   22 250 lb (113.4 kg)   22 250 lb (113.4 kg)   22 255 lb 6.4 oz (115.8 kg)     Body mass index is 31.25 kg/m².     CBC:   Lab Results   Component Value Date/Time    WBC 10.9 03/15/2022 10:37 AM    WBC 8.7 10/29/2020 03:36 PM    RBC 4.33 2022 09:47 AM    HGB 12.5 03/15/2022 10:37 AM    HCT 38.4 03/15/2022 10:37 AM    MCV 90.8 2022 09:47 AM    RDW 15.0 2022 09:47 AM     03/15/2022 10:37 AM       CMP:   Lab Results   Component Value Date/Time     2022 09:47 AM    K 4.5 2022 09:47 AM     2022 09:47 AM    CO2 27 2022 09:47 AM    BUN 15 2022 09:47 AM    CREATININE 1.2 03/15/2022 10:37 AM    CREATININE 1.1 2021 12:00 AM    GFRAA >60 10/29/2020 03:36 PM    AGRATIO 1.7 10/29/2020 03:36 PM LABGLOM > 60 08/31/2022 09:47 AM    LABGLOM 55 08/31/2022 09:47 AM    LABGLOM >60 10/29/2020 03:36 PM    GLUCOSE 124 08/31/2022 09:47 AM    PROT 7.1 03/15/2022 10:37 AM    CALCIUM 9.1 08/31/2022 09:47 AM    BILITOT 0.5 03/15/2022 10:37 AM    ALKPHOS 84 03/15/2022 10:37 AM    AST 29 03/15/2022 10:37 AM    ALT 32 03/15/2022 10:37 AM       POC Tests:   Recent Labs     09/13/22  0623   POCGLU 134*       Coags: No results found for: PROTIME, INR, APTT    HCG (If Applicable): No results found for: PREGTESTUR, PREGSERUM, HCG, HCGQUANT     ABGs: No results found for: PHART, PO2ART, RRX6GHC, HKI2ICA, BEART, P1WNLOWB     Type & Screen (If Applicable):  No results found for: LABABO, LABRH    Drug/Infectious Status (If Applicable):  No results found for: HIV, HEPCAB    COVID-19 Screening (If Applicable): No results found for: COVID19        Anesthesia Evaluation  Patient summary reviewed no history of anesthetic complications:   Airway: Mallampati: I  TM distance: >3 FB   Neck ROM: full  Mouth opening: > = 3 FB   Dental:    (+) edentulous      Pulmonary:normal exam  breath sounds clear to auscultation  (+) sleep apnea:                             Cardiovascular:    (+) hypertension:, CAD (10 years ago): no interval change, CABG/stent: no interval change,         Rhythm: regular  Rate: normal                    Neuro/Psych:               GI/Hepatic/Renal: Neg GI/Hepatic/Renal ROS            Endo/Other:    (+) DiabetesType II DM, , .                 Abdominal:             Vascular: negative vascular ROS. Other Findings:           Anesthesia Plan      MAC     ASA 3       Induction: intravenous. Anesthetic plan and risks discussed with patient. Plan discussed with CRNA.     Attending anesthesiologist reviewed and agrees with Preprocedure content                Jp Ochoa MD   9/13/2022

## 2022-09-13 NOTE — DISCHARGE INSTRUCTIONS
Post Op DISE Instructions    General Anesthesia or Sedation   · Do not drive or operate heavy machinery for 24 hours. · Do not consume alcohol, tranquilizers, sleeping medications, or any non-prescribed medications for 24 hours. · Do not make important decisions or sign any important papers in the next 24 hours. · You should have someone with you tonight at home. · You may appear flushed for several hours after surgery   Activity   You are advised to go directly home from the hospital.  Resume light to normal activity today  Fluids and Diet   · Begin with clear liquids, bouillon, dry toast, soda crackers. If not nauseated, you may go to a regular diet when you desire. Greasy and spicy foods are not advised after anesthesia   Medications   · You may resume your daily prescription medication schedule   Follow up care   Call your surgeon if you have any problem that concerns you. After hours, you can reach your physician through his/her answering service. If you need IMMEDIATE ATTENTION, come to [address] [place] [address]. You are a candidate for inspire. Call the office to set up a visit to discuss next steps and details of implantation. [phone]      ANESTHESIA DISCHARGE INSTRUCTIONS    You are under the influence of drugs- do not drink alcohol, drive a car, operate machinery(such as power tools, kitchen appliances, etc), sign legal documents, or make any important decisions for 24 hours (or while on pain medications). Children should not ride bikes or [place] or play on gym sets  for 24 hours after surgery. A responsible adult should be with you for 24 hours. Rest at home today- increase activity as tolerated. Progress slowly to a regular diet unless your physician has instructed you otherwise. Drink plenty of water.     CALL YOUR DOCTOR IF YOU:  Have moderate to severe nausea or vomiting AND are unable to hold down fluids or prescribed medications. Have bright red bloody drainage from your dressing that won't stop oozing. Do not get relief with your pain medication    NORMAL (POSSIBLE) SIDE EFFECTS FROM ANESTHESIA:     Confusion, temporary memory loss, delayed reaction times in the first 24 hours  Lightheadedness, dizziness, difficulty focusing, blurred vision  Nausea/vomiting can happen  Shivering, feeling cold, sore throat, cough and muscle aches should stop within 24-48 hours  Trouble urinating - call your surgeon if it has been more than 8 hrs  Bruising or soreness at the IV site - call if it remains red, firm or there is drainage             FEMALES OF CHILDBEARING AGE WHO ARE TAKING BIRTH CONTROL PILLS:  You may have received a medication during your procedure that interferes with the   actions of birth control pills (Bridion or Emend). Use some other kind of birth control in addition to your pills, like a condom, for 1 month after your procedure to prevent unwanted pregnancy. The following instructions are to be followed if you have a known history or diagnosis of sleep apnea: For all sleep apnea patients:  ? Sleep on your side or sitting up in a chair whenever possible, especially the first 24 hours after surgery. ? Use only medicines prescribed by your doctor. ? Do not drink alcohol. ? If you have a dental device to assist you while at rest, use it at all times for the first 24 hours. For patients using CPAP machines:  ? Use your CPAP machine during all periods of sleep as usual.  ? Use your CPAP machine during all periods of daytime rest while on pain medicines. ** Follow up with your primary care doctor for continued care. IF YOU DO NOT TAKE ALL OF YOUR NARCOTIC PAIN MEDICATION, please dispose of them responsibly. There are drop off boxes in the Emergency Departments 24/7 at both [facility] and [facility].  If these locations are not convenient, other options for discarding them can be

## 2022-09-13 NOTE — OP NOTE
[address] [facility]  OPERATIVE REPORT    Patient Name: Diamond Alvarado. YOB: 1955  Medical Record Number:  [id number]  Billing Number:  [id number]  Date of Procedure: 09/13/22  Time: 0715    Pre Operative Diagnoses: Obstructive Sleep Apnea  Post Operative Diagnoses:  Obstructive Sleep Apnea           Procedure:  1. Drug Induced Sleep endoscopy (75253)       Surgeon: Bebo Roles, DO    OR Staff/ Assistant:  Circulator: Nikki Ham RN  Surgical Assistant: Vince Jett  Scrub Person First: Maulik Bangura    Anesthesia:  Sedation     Findings:  1) anterior posterior collapse    Indications: This is a 75 y.o. male with history of moderate to severe symptomatic obstructive sleep apnea, who is intolerant unable to achieve benefit with positive pressure therapy, presents today for drug-induced sleep endoscopy to better characterize her locations and pattern of obstruction to predict appropriate medical and/or surgical options moving forward. Risks and benefits discussed with the patient including alternate treatment options, Informed consent was obtained, the patient elected to proceed with the planned procedure. DETAILS OF PROCEDURE(S):       The patient was brought to the operating room and was anesthetized via the standard drug-induced sleep endoscopy protocol. The propofol infusion rate was started at 100 MCG and increase gradually to level at which conditions that mimic sleep were gradually observed. With the patient unresponsive to verbal commands, but still with spontaneous respiration, sleep disordered breathing events and associated desaturations were clearly observed. Under these conditions, flexible endoscope was inserted to examine both sides of the nose as well as the pharynx. The nose was relatively unremarkable. The retropatellar space showed a more obliquely oriented palate.   A mild lateral wall component was noted, but the palatal collapse was primarily an anterior posterior fashion. More distally, mild lateral oropharyngeal wall component was noted, but again no complete lateral oropharyngeal collapse. 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 In summary, there is no evidence of complete concentric palatal obstruction and does appear to be a candidate anatomically for hypoglossal nerve stimulation therapy. I was present for and performed the entire procedure. Estimated Blood Loss: 0mL                 Specimens: None           Complications: There were no complications.     Lorelei Terrell DO      Electronically signed by Lorelei Terrell DO on 9/13/2022 at 7:26 AM

## 2022-09-15 ENCOUNTER — SCHEDULED TELEPHONE ENCOUNTER (OUTPATIENT)
Dept: ENT CLINIC | Age: 67
End: 2022-09-15
Payer: MEDICARE

## 2022-09-15 DIAGNOSIS — G47.33 OBSTRUCTIVE SLEEP APNEA SYNDROME: Primary | ICD-10-CM

## 2022-09-15 PROCEDURE — 1123F ACP DISCUSS/DSCN MKR DOCD: CPT | Performed by: OTOLARYNGOLOGY

## 2022-09-15 PROCEDURE — 99213 OFFICE O/P EST LOW 20 MIN: CPT | Performed by: OTOLARYNGOLOGY

## 2022-09-15 NOTE — PROGRESS NOTES
Lianne Pace is a 75 y.o. male evaluated via telephone on 9/15/2022 for Follow-up (Patient is doing a phone visit for a follow up from DISE procedure. Patient has no questions or concerns. )  . Documentation:  I communicated with the patient and/or health care decision maker about BMI 31, tried failed CPAP for greater than 3 months secondary to constantly despite multiple medicine adjustments, as anterior posterior collapse on sleep endoscopy and moderate sleep apnea on sleep study. Details of this discussion including any medical advice provided: Inspire procedure discussed in detail were specifically discussed for risk of postoperative infection requiring explantation, risk of neuropraxia resulting in delayed activation, difficulty speaking, chewing potential for them to not work, pneumothorax. All questions answered patient wishes to move forward with implantation    Total Time: minutes: 5-10 minutes    Lianne Pace was evaluated through a synchronous (real-time) audio encounter. Patient identification was verified at the start of the visit. He (or guardian if applicable) is aware that this is a billable service, which includes applicable co-pays. This visit was conducted with the patient's (and/or legal guardian's) verbal consent. He has not had a related appointment within my department in the past 7 days or scheduled within the next 24 hours. The patient was located at Home: [address] [place] [address]. The provider was located at Home ([address]: [place].     Note: not billable if this call serves to triage the patient into an appointment for the relevant concern    Jordana Ryan DO

## 2022-09-19 ENCOUNTER — TELEPHONE (OUTPATIENT)
Dept: ENT CLINIC | Age: 67
End: 2022-09-19

## 2022-09-30 ENCOUNTER — TELEPHONE (OUTPATIENT)
Dept: ENT CLINIC | Age: 67
End: 2022-09-30

## 2022-10-03 ENCOUNTER — TELEPHONE (OUTPATIENT)
Dept: PULMONOLOGY | Age: 67
End: 2022-10-03

## 2022-10-10 ENCOUNTER — OFFICE VISIT (OUTPATIENT)
Dept: INTERNAL MEDICINE CLINIC | Age: 67
End: 2022-10-10
Payer: MEDICARE

## 2022-10-10 VITALS
HEIGHT: 75 IN | BODY MASS INDEX: 32 KG/M2 | WEIGHT: 257.4 LBS | OXYGEN SATURATION: 99 % | DIASTOLIC BLOOD PRESSURE: 58 MMHG | HEART RATE: 77 BPM | SYSTOLIC BLOOD PRESSURE: 110 MMHG

## 2022-10-10 DIAGNOSIS — E11.29 TYPE 2 DIABETES MELLITUS WITH MICROALBUMINURIA, WITHOUT LONG-TERM CURRENT USE OF INSULIN (HCC): ICD-10-CM

## 2022-10-10 DIAGNOSIS — R80.9 TYPE 2 DIABETES MELLITUS WITH MICROALBUMINURIA, WITHOUT LONG-TERM CURRENT USE OF INSULIN (HCC): ICD-10-CM

## 2022-10-10 DIAGNOSIS — I25.10 CORONARY ARTERY DISEASE INVOLVING NATIVE CORONARY ARTERY OF NATIVE HEART WITHOUT ANGINA PECTORIS: Chronic | ICD-10-CM

## 2022-10-10 DIAGNOSIS — G47.33 OBSTRUCTIVE SLEEP APNEA SYNDROME: ICD-10-CM

## 2022-10-10 DIAGNOSIS — I10 ESSENTIAL HYPERTENSION: Chronic | ICD-10-CM

## 2022-10-10 DIAGNOSIS — G47.33 OBSTRUCTIVE SLEEP APNEA SYNDROME: Primary | ICD-10-CM

## 2022-10-10 PROBLEM — R00.2 PALPITATIONS: Status: RESOLVED | Noted: 2019-12-17 | Resolved: 2022-10-10

## 2022-10-10 LAB
ANION GAP SERPL CALCULATED.3IONS-SCNC: 15 MMOL/L (ref 3–16)
BANDED NEUTROPHILS RELATIVE PERCENT: 1 % (ref 0–7)
BASOPHILS ABSOLUTE: 0.1 K/UL (ref 0–0.2)
BASOPHILS RELATIVE PERCENT: 1 %
BUN BLDV-MCNC: 16 MG/DL (ref 7–20)
CALCIUM SERPL-MCNC: 9.5 MG/DL (ref 8.3–10.6)
CHLORIDE BLD-SCNC: 98 MMOL/L (ref 99–110)
CO2: 25 MMOL/L (ref 21–32)
CREAT SERPL-MCNC: 1.3 MG/DL (ref 0.8–1.3)
EOSINOPHILS ABSOLUTE: 0.6 K/UL (ref 0–0.6)
EOSINOPHILS RELATIVE PERCENT: 7 %
GFR AFRICAN AMERICAN: >60
GFR NON-AFRICAN AMERICAN: 55
GLUCOSE BLD-MCNC: 159 MG/DL (ref 70–99)
HCT VFR BLD CALC: 40.1 % (ref 40.5–52.5)
HEMOGLOBIN: 13 G/DL (ref 13.5–17.5)
LYMPHOCYTES ABSOLUTE: 1.6 K/UL (ref 1–5.1)
LYMPHOCYTES RELATIVE PERCENT: 18 %
MCH RBC QN AUTO: 29.7 PG (ref 26–34)
MCHC RBC AUTO-ENTMCNC: 32.5 G/DL (ref 31–36)
MCV RBC AUTO: 91.5 FL (ref 80–100)
MONOCYTES ABSOLUTE: 0.6 K/UL (ref 0–1.3)
MONOCYTES RELATIVE PERCENT: 7 %
MYELOCYTE PERCENT: 1 %
NEUTROPHILS ABSOLUTE: 5.9 K/UL (ref 1.7–7.7)
NEUTROPHILS RELATIVE PERCENT: 65 %
PDW BLD-RTO: 15 % (ref 12.4–15.4)
PLATELET # BLD: 222 K/UL (ref 135–450)
PLATELET SLIDE REVIEW: ADEQUATE
PMV BLD AUTO: 8.9 FL (ref 5–10.5)
POLYCHROMASIA: ABNORMAL
POTASSIUM SERPL-SCNC: 4.5 MMOL/L (ref 3.5–5.1)
RBC # BLD: 4.38 M/UL (ref 4.2–5.9)
SLIDE REVIEW: ABNORMAL
SODIUM BLD-SCNC: 138 MMOL/L (ref 136–145)
WBC # BLD: 8.8 K/UL (ref 4–11)

## 2022-10-10 PROCEDURE — 2022F DILAT RTA XM EVC RTNOPTHY: CPT | Performed by: INTERNAL MEDICINE

## 2022-10-10 PROCEDURE — 1036F TOBACCO NON-USER: CPT | Performed by: INTERNAL MEDICINE

## 2022-10-10 PROCEDURE — G8427 DOCREV CUR MEDS BY ELIG CLIN: HCPCS | Performed by: INTERNAL MEDICINE

## 2022-10-10 PROCEDURE — 1123F ACP DISCUSS/DSCN MKR DOCD: CPT | Performed by: INTERNAL MEDICINE

## 2022-10-10 PROCEDURE — 3017F COLORECTAL CA SCREEN DOC REV: CPT | Performed by: INTERNAL MEDICINE

## 2022-10-10 PROCEDURE — 3044F HG A1C LEVEL LT 7.0%: CPT | Performed by: INTERNAL MEDICINE

## 2022-10-10 PROCEDURE — G8417 CALC BMI ABV UP PARAM F/U: HCPCS | Performed by: INTERNAL MEDICINE

## 2022-10-10 PROCEDURE — 99214 OFFICE O/P EST MOD 30 MIN: CPT | Performed by: INTERNAL MEDICINE

## 2022-10-10 PROCEDURE — G8484 FLU IMMUNIZE NO ADMIN: HCPCS | Performed by: INTERNAL MEDICINE

## 2022-10-10 ASSESSMENT — ENCOUNTER SYMPTOMS
ABDOMINAL PAIN: 0
COUGH: 0
CHEST TIGHTNESS: 0
CONSTIPATION: 0
SHORTNESS OF BREATH: 0
WHEEZING: 0
COLOR CHANGE: 0
SINUS PAIN: 0
BLOOD IN STOOL: 0

## 2022-10-10 NOTE — H&P (VIEW-ONLY)
Darlin Harrington (:  1955) is a 75 y.o. male,Established patient, here for evaluation of the following chief complaint(s):  Pre-op Exam (ANIA Francois 46 10/24 with Leonard Gilliland DO)         ASSESSMENT/PLAN:  1. Obstructive sleep apnea syndrome  -     CBC with Auto Differential; Future  -     Basic Metabolic Panel; Future  2. Type 2 diabetes mellitus with microalbuminuria, without long-term current use of insulin (HCC)  3. Coronary artery disease involving native coronary artery of native heart without angina pectoris  4. Essential hypertension  Moderate given his age and comorbid conditions he is to stay off aspirin while in any over-the-counter multivitamin pills    The patient will have blood test done tomorrow to keep it within the 30-day timeframe if his blood test is normal then we will addend this note we will clear him for surgery    It is advised to hold his glipizide and metformin the morning of the surgery and to take his blood pressure medicine only with sips of water and otherwise he can resume all of his medication after the procedure      Addendum 10/11/2022 patient CBC and BMP were reviewed patient is cleared for surgery  Return if symptoms worsen or fail to improve, for As scheduled.          Subjective   SUBJECTIVE/OBJECTIVE:    Lab Review   Lab Results   Component Value Date/Time     2022 09:47 AM     03/15/2022 10:37 AM     10/29/2020 03:36 PM    K 4.5 2022 09:47 AM    K 5.3 03/15/2022 10:37 AM    K 4.8 2021 12:00 AM    K 4.6 10/29/2020 03:36 PM    CO2 27 2022 09:47 AM    CO2 23 03/15/2022 10:37 AM    CO2 29 10/29/2020 03:36 PM    BUN 15 2022 09:47 AM    BUN 12 03/15/2022 10:37 AM    BUN 13 10/29/2020 03:36 PM    CREATININE 1.2 03/15/2022 10:37 AM    CREATININE 1.1 2021 12:00 AM    CREATININE 1.0 10/29/2020 03:36 PM    CREATININE 1.0 2020 02:25 PM    GLUCOSE 124 2022 09:47 AM    GLUCOSE 126 03/15/2022 10:37 AM GLUCOSE 198 10/29/2020 03:36 PM    GLUCOSE 136 03/23/2020 02:25 PM    CALCIUM 9.1 08/31/2022 09:47 AM    CALCIUM 9.8 03/15/2022 10:37 AM    CALCIUM 9.8 10/29/2020 03:36 PM     Lab Results   Component Value Date/Time    WBC 10.9 03/15/2022 10:37 AM    WBC 8.7 10/29/2020 03:36 PM    WBC 11.0 04/27/2020 09:56 AM    WBC 13.4 03/23/2020 02:25 PM    HGB 12.5 03/15/2022 10:37 AM    HGB 13.4 10/29/2020 03:36 PM    HGB 14.2 04/27/2020 09:56 AM    HCT 38.4 03/15/2022 10:37 AM    HCT 38.9 10/29/2020 03:36 PM    HCT 42.0 04/27/2020 09:56 AM    MCV 90.8 08/31/2022 09:47 AM    MCV 91.2 03/15/2022 10:37 AM    MCV 91.1 10/29/2020 03:36 PM     03/15/2022 10:37 AM     10/29/2020 03:36 PM     04/27/2020 09:56 AM     Lab Results   Component Value Date/Time    CHOL 122 03/15/2022 10:37 AM    CHOL 160 04/19/2021 12:00 AM    CHOL 153 01/19/2021 12:00 AM    CHOL 161 03/23/2020 02:25 PM    TRIG 169 03/15/2022 10:37 AM    TRIG 189 04/19/2021 12:00 AM    TRIG 168 01/19/2021 12:00 AM    HDL 36 03/15/2022 10:37 AM    HDL 43 04/19/2021 12:00 AM    HDL 39 01/19/2021 12:00 AM       Vitals 10/10/2022 9/13/2022 4/55/4095   SYSTOLIC 436 938 914   DIASTOLIC 58 82 68   Site - - -   Position - - -   Cuff Size - - -   Pulse 77 75 78   Temp - - -   Resp - 16 16   SpO2 99 94 93   Weight 257 lb 6.4 oz - -   Height 6' 3\" - -   Body mass index 32.17 kg/m2 - -   Pain Level - 0 0   Some recent data might be hidden       At this point the patient is here for an evaluation of presurgical risk the patient is having no cardiovascular or pulmonary symptoms he is fairly active    The patient has been evaluated by ENT/pulmonology and he failed a trial of CPAP and his  Had an inspire device implanted      Review of Systems   Constitutional:  Negative for activity change, appetite change, fatigue and unexpected weight change. HENT:  Negative for congestion, ear pain and sinus pain.     Respiratory:  Negative for cough, chest tightness, shortness of breath and wheezing. Cardiovascular:  Negative for chest pain and palpitations. Gastrointestinal:  Negative for abdominal pain, blood in stool and constipation. Endocrine: Negative for cold intolerance, heat intolerance and polyuria. Genitourinary:  Negative for dysuria, frequency and urgency. Musculoskeletal:  Negative for arthralgias and myalgias. Skin:  Negative for color change and rash. Neurological:  Negative for weakness and headaches. Hematological:  Negative for adenopathy. Does not bruise/bleed easily. Psychiatric/Behavioral:  Negative for agitation, dysphoric mood and sleep disturbance. Objective   Physical Exam  Vitals and nursing note reviewed. Constitutional:       General: He is not in acute distress. Appearance: Normal appearance. HENT:      Head: Normocephalic and atraumatic. Right Ear: Tympanic membrane normal.      Left Ear: Tympanic membrane normal.      Nose: Nose normal.   Eyes:      Extraocular Movements: Extraocular movements intact. Conjunctiva/sclera: Conjunctivae normal.      Pupils: Pupils are equal, round, and reactive to light. Neck:      Vascular: No carotid bruit. Cardiovascular:      Rate and Rhythm: Normal rate and regular rhythm. Pulses: Normal pulses. Heart sounds: No murmur heard. Pulmonary:      Effort: Pulmonary effort is normal. No respiratory distress. Breath sounds: Normal breath sounds. Abdominal:      General: Abdomen is flat. Bowel sounds are normal. There is no distension. Palpations: Abdomen is soft. Tenderness: There is no abdominal tenderness. Musculoskeletal:         General: No swelling, tenderness or deformity. Cervical back: Normal range of motion and neck supple. No rigidity or tenderness. Right lower leg: No edema. Left lower leg: No edema. Lymphadenopathy:      Cervical: No cervical adenopathy. Skin:     Coloration: Skin is not jaundiced.       Findings: No bruising, erythema or lesion. Neurological:      General: No focal deficit present. Mental Status: He is alert and oriented to person, place, and time. Cranial Nerves: No cranial nerve deficit. Motor: No weakness. Gait: Gait normal.          This dictation was generated by voice recognition computer software. Although all attempts are made to edit the dictation for accuracy, there may be errors in the transcription that are not intended. An electronic signature was used to authenticate this note.     --Sonya Sky MD

## 2022-10-10 NOTE — PROGRESS NOTES
Cecilia Delvalle (:  1955) is a 75 y.o. male,Established patient, here for evaluation of the following chief complaint(s):  Pre-op Exam (ANIA Heath Francois 46 10/24 with Augie Lebron DO)         ASSESSMENT/PLAN:  1. Obstructive sleep apnea syndrome  -     CBC with Auto Differential; Future  -     Basic Metabolic Panel; Future  2. Type 2 diabetes mellitus with microalbuminuria, without long-term current use of insulin (HCC)  3. Coronary artery disease involving native coronary artery of native heart without angina pectoris  4. Essential hypertension  Moderate given his age and comorbid conditions he is to stay off aspirin while in any over-the-counter multivitamin pills    The patient will have blood test done tomorrow to keep it within the 30-day timeframe if his blood test is normal then we will addend this note we will clear him for surgery    It is advised to hold his glipizide and metformin the morning of the surgery and to take his blood pressure medicine only with sips of water and otherwise he can resume all of his medication after the procedure      Addendum 10/11/2022 patient CBC and BMP were reviewed patient is cleared for surgery  Return if symptoms worsen or fail to improve, for As scheduled.          Subjective   SUBJECTIVE/OBJECTIVE:    Lab Review   Lab Results   Component Value Date/Time     2022 09:47 AM     03/15/2022 10:37 AM     10/29/2020 03:36 PM    K 4.5 2022 09:47 AM    K 5.3 03/15/2022 10:37 AM    K 4.8 2021 12:00 AM    K 4.6 10/29/2020 03:36 PM    CO2 27 2022 09:47 AM    CO2 23 03/15/2022 10:37 AM    CO2 29 10/29/2020 03:36 PM    BUN 15 2022 09:47 AM    BUN 12 03/15/2022 10:37 AM    BUN 13 10/29/2020 03:36 PM    CREATININE 1.2 03/15/2022 10:37 AM    CREATININE 1.1 2021 12:00 AM    CREATININE 1.0 10/29/2020 03:36 PM    CREATININE 1.0 2020 02:25 PM    GLUCOSE 124 2022 09:47 AM    GLUCOSE 126 03/15/2022 10:37 AM GLUCOSE 198 10/29/2020 03:36 PM    GLUCOSE 136 03/23/2020 02:25 PM    CALCIUM 9.1 08/31/2022 09:47 AM    CALCIUM 9.8 03/15/2022 10:37 AM    CALCIUM 9.8 10/29/2020 03:36 PM     Lab Results   Component Value Date/Time    WBC 10.9 03/15/2022 10:37 AM    WBC 8.7 10/29/2020 03:36 PM    WBC 11.0 04/27/2020 09:56 AM    WBC 13.4 03/23/2020 02:25 PM    HGB 12.5 03/15/2022 10:37 AM    HGB 13.4 10/29/2020 03:36 PM    HGB 14.2 04/27/2020 09:56 AM    HCT 38.4 03/15/2022 10:37 AM    HCT 38.9 10/29/2020 03:36 PM    HCT 42.0 04/27/2020 09:56 AM    MCV 90.8 08/31/2022 09:47 AM    MCV 91.2 03/15/2022 10:37 AM    MCV 91.1 10/29/2020 03:36 PM     03/15/2022 10:37 AM     10/29/2020 03:36 PM     04/27/2020 09:56 AM     Lab Results   Component Value Date/Time    CHOL 122 03/15/2022 10:37 AM    CHOL 160 04/19/2021 12:00 AM    CHOL 153 01/19/2021 12:00 AM    CHOL 161 03/23/2020 02:25 PM    TRIG 169 03/15/2022 10:37 AM    TRIG 189 04/19/2021 12:00 AM    TRIG 168 01/19/2021 12:00 AM    HDL 36 03/15/2022 10:37 AM    HDL 43 04/19/2021 12:00 AM    HDL 39 01/19/2021 12:00 AM       Vitals 10/10/2022 9/13/2022 3/20/7714   SYSTOLIC 601 561 725   DIASTOLIC 58 82 68   Site - - -   Position - - -   Cuff Size - - -   Pulse 77 75 78   Temp - - -   Resp - 16 16   SpO2 99 94 93   Weight 257 lb 6.4 oz - -   Height 6' 3\" - -   Body mass index 32.17 kg/m2 - -   Pain Level - 0 0   Some recent data might be hidden       At this point the patient is here for an evaluation of presurgical risk the patient is having no cardiovascular or pulmonary symptoms he is fairly active    The patient has been evaluated by ENT/pulmonology and he failed a trial of CPAP and his  Had an inspire device implanted      Review of Systems   Constitutional:  Negative for activity change, appetite change, fatigue and unexpected weight change. HENT:  Negative for congestion, ear pain and sinus pain.     Respiratory:  Negative for cough, chest tightness, shortness of breath and wheezing. Cardiovascular:  Negative for chest pain and palpitations. Gastrointestinal:  Negative for abdominal pain, blood in stool and constipation. Endocrine: Negative for cold intolerance, heat intolerance and polyuria. Genitourinary:  Negative for dysuria, frequency and urgency. Musculoskeletal:  Negative for arthralgias and myalgias. Skin:  Negative for color change and rash. Neurological:  Negative for weakness and headaches. Hematological:  Negative for adenopathy. Does not bruise/bleed easily. Psychiatric/Behavioral:  Negative for agitation, dysphoric mood and sleep disturbance. Objective   Physical Exam  Vitals and nursing note reviewed. Constitutional:       General: He is not in acute distress. Appearance: Normal appearance. HENT:      Head: Normocephalic and atraumatic. Right Ear: Tympanic membrane normal.      Left Ear: Tympanic membrane normal.      Nose: Nose normal.   Eyes:      Extraocular Movements: Extraocular movements intact. Conjunctiva/sclera: Conjunctivae normal.      Pupils: Pupils are equal, round, and reactive to light. Neck:      Vascular: No carotid bruit. Cardiovascular:      Rate and Rhythm: Normal rate and regular rhythm. Pulses: Normal pulses. Heart sounds: No murmur heard. Pulmonary:      Effort: Pulmonary effort is normal. No respiratory distress. Breath sounds: Normal breath sounds. Abdominal:      General: Abdomen is flat. Bowel sounds are normal. There is no distension. Palpations: Abdomen is soft. Tenderness: There is no abdominal tenderness. Musculoskeletal:         General: No swelling, tenderness or deformity. Cervical back: Normal range of motion and neck supple. No rigidity or tenderness. Right lower leg: No edema. Left lower leg: No edema. Lymphadenopathy:      Cervical: No cervical adenopathy. Skin:     Coloration: Skin is not jaundiced.       Findings: No bruising, erythema or lesion. Neurological:      General: No focal deficit present. Mental Status: He is alert and oriented to person, place, and time. Cranial Nerves: No cranial nerve deficit. Motor: No weakness. Gait: Gait normal.          This dictation was generated by voice recognition computer software. Although all attempts are made to edit the dictation for accuracy, there may be errors in the transcription that are not intended. An electronic signature was used to authenticate this note.     --Robbi Gottron, MD

## 2022-10-14 NOTE — PROGRESS NOTES
Pema Crofts. Age 75 y.o.    male    1955    MRN [id number]    10/24/2022  Arrival Time_____________  OR Time____________[address]     Procedure(s):  INSPIRE DEVICE PLACEMENT                      General    Surgeon(s):  Ed Quintanaor, DO       Phone [phone] ([place])     [address]  Cell         Work  _____________________________________________________________________  _____________________________________________________________________  _____________________________________________________________________  _____________________________________________________________________  _____________________________________________________________________    PCP _____________________________ Phone_________________     H&P__________________Bringing      Chart            Epic   DOS      Called________  EKG__________________Bringing      Chart            Epic   DOS      Called________  LAB__________________ Bringing      Chart            Epic   DOS      Called________  Cardiac Clearance_______Bringing      Chart            Epic      DOS      Called________    Cardiologist________________________ Phone___________________________    ? Shinto concerns / Waiver on Chart            PAT Communications________________  ? Pre-op Instructions Given [place]          _________________________________  ? Directions to Surgery Center                          _________________________________  ? Transportation Home_______________      __________________________________  ?  Crutches/Walker__________________        __________________________________    ________Pre-op Orders   _______Transcribed    _______Wt.  ________Pharmacy          _______SCD  ______VTE     ______TED Emili Sweeney  _______  Surgery Consent    _______  Anesthesia Consent         COVID DATE______________LOCATION________________ RESULT__________

## 2022-10-17 ENCOUNTER — TELEPHONE (OUTPATIENT)
Dept: ENT CLINIC | Age: 67
End: 2022-10-17

## 2022-10-17 NOTE — TELEPHONE ENCOUNTER
Wife would like to speak to your about his [facility]. She has a few questions and also wanted you to be aware his PCP wants him to continue his 81 mg Asprin prior to surgery.      [phone]

## 2022-10-19 NOTE — PROGRESS NOTES

## 2022-10-21 ENCOUNTER — TELEPHONE (OUTPATIENT)
Dept: ENT CLINIC | Age: 67
End: 2022-10-21

## 2022-10-21 ENCOUNTER — PREP FOR PROCEDURE (OUTPATIENT)
Dept: ENT CLINIC | Age: 67
End: 2022-10-21

## 2022-10-21 LAB
ALBUMIN/GLOBULIN RATIO: 1.8 RATIO (ref 0.8–2.6)
ALBUMIN: 4.6 G/DL (ref 3.5–5.2)
ALP BLD-CCNC: 76 U/L (ref 23–144)
ALT SERPL-CCNC: 39 U/L (ref 0–60)
AST SERPL-CCNC: 25 U/L (ref 0–55)
BILIRUB SERPL-MCNC: 0.4 MG/DL (ref 0–1.2)
BUN BLDV-MCNC: 19 MG/DL (ref 3–29)
BUN/CREAT BLD: 17 (ref 7–25)
CALCIUM SERPL-MCNC: 10 MG/DL (ref 8.5–10.5)
CHLORIDE BLD-SCNC: 98 MEQ/L (ref 96–110)
CHOLESTEROL: 130 MG/DL
CO2: 29 MEQ/L (ref 19–32)
CREAT SERPL-MCNC: 1.1 MG/DL (ref 0.5–1.4)
CREATININE URINE: 62.9 MG/DL
GLOBULIN: 2.5 G/DL (ref 1.9–3.6)
GLOMERULAR FILTRATION RATE: 73 MLS/MIN/1.73M2
GLUCOSE BLD-MCNC: 149 MG/DL (ref 70–99)
HBA1C MFR BLD: 6.7 % (ref 4–6)
HDLC SERPL-MCNC: 40 MG/DL
LDL CHOLESTEROL CALCULATED: 60 MG/DL
MICROALBUMIN UR-MCNC: 3660 MCG/DL
MICROALBUMIN/CREAT UR-RTO: 58 MCG/MG CREAT.
POTASSIUM SERPL-SCNC: 4.9 MEQ/L (ref 3.4–5.3)
SODIUM BLD-SCNC: 136 MEQ/L (ref 135–148)
STATUS: ABNORMAL
TOTAL PROTEIN: 7.1 G/DL (ref 6–8.3)
TRIGL SERPL-MCNC: 148 MG/DL
VLDLC SERPL CALC-MCNC: 30 MG/DL (ref 4–38)

## 2022-10-21 RX ORDER — SODIUM CHLORIDE 0.9 % (FLUSH) 0.9 %
5-40 SYRINGE (ML) INJECTION PRN
Status: CANCELLED | OUTPATIENT
Start: 2022-10-21

## 2022-10-21 RX ORDER — SODIUM CHLORIDE 0.9 % (FLUSH) 0.9 %
5-40 SYRINGE (ML) INJECTION EVERY 12 HOURS SCHEDULED
Status: CANCELLED | OUTPATIENT
Start: 2022-10-21

## 2022-10-21 RX ORDER — SODIUM CHLORIDE 9 MG/ML
INJECTION, SOLUTION INTRAVENOUS PRN
Status: CANCELLED | OUTPATIENT
Start: 2022-10-21

## 2022-10-24 ENCOUNTER — HOSPITAL ENCOUNTER (OUTPATIENT)
Age: 67
Setting detail: OUTPATIENT SURGERY
Discharge: HOME | End: 2022-10-24
Attending: OTOLARYNGOLOGY | Admitting: OTOLARYNGOLOGY
Payer: MEDICARE

## 2022-10-24 ENCOUNTER — APPOINTMENT (OUTPATIENT)
Dept: GENERAL RADIOLOGY | Age: 67
End: 2022-10-24
Attending: OTOLARYNGOLOGY
Payer: MEDICARE

## 2022-10-24 ENCOUNTER — ANESTHESIA (OUTPATIENT)
Dept: OPERATING ROOM | Age: 67
End: 2022-10-24
Payer: MEDICARE

## 2022-10-24 ENCOUNTER — ANESTHESIA EVENT (OUTPATIENT)
Dept: OPERATING ROOM | Age: 67
End: 2022-10-24
Payer: MEDICARE

## 2022-10-24 VITALS
BODY MASS INDEX: 31.08 KG/M2 | DIASTOLIC BLOOD PRESSURE: 67 MMHG | WEIGHT: 250 LBS | HEART RATE: 85 BPM | TEMPERATURE: 97.2 F | HEIGHT: 75 IN | RESPIRATION RATE: 15 BRPM | OXYGEN SATURATION: 94 % | SYSTOLIC BLOOD PRESSURE: 122 MMHG

## 2022-10-24 DIAGNOSIS — G89.18 POST-OP PAIN: Primary | ICD-10-CM

## 2022-10-24 LAB
GLUCOSE BLD-MCNC: 129 MG/DL (ref 70–99)
GLUCOSE BLD-MCNC: 139 MG/DL (ref 70–99)
PERFORMED ON: ABNORMAL
PERFORMED ON: ABNORMAL

## 2022-10-24 PROCEDURE — 70360 X-RAY EXAM OF NECK: CPT

## 2022-10-24 PROCEDURE — C1778 LEAD, NEUROSTIMULATOR: HCPCS | Performed by: OTOLARYNGOLOGY

## 2022-10-24 PROCEDURE — 2709999900 HC NON-CHARGEABLE SUPPLY: Performed by: OTOLARYNGOLOGY

## 2022-10-24 PROCEDURE — 7100000001 HC PACU RECOVERY - ADDTL 15 MIN: Performed by: OTOLARYNGOLOGY

## 2022-10-24 PROCEDURE — 7100000010 HC PHASE II RECOVERY - FIRST 15 MIN: Performed by: OTOLARYNGOLOGY

## 2022-10-24 PROCEDURE — 6360000002 HC RX W HCPCS: Performed by: NURSE ANESTHETIST, CERTIFIED REGISTERED

## 2022-10-24 PROCEDURE — 7100000000 HC PACU RECOVERY - FIRST 15 MIN: Performed by: OTOLARYNGOLOGY

## 2022-10-24 PROCEDURE — 3700000001 HC ADD 15 MINUTES (ANESTHESIA): Performed by: OTOLARYNGOLOGY

## 2022-10-24 PROCEDURE — 2500000003 HC RX 250 WO HCPCS: Performed by: NURSE ANESTHETIST, CERTIFIED REGISTERED

## 2022-10-24 PROCEDURE — 64582 OPN MPLTJ HPGLSL NSTM ARY PG: CPT | Performed by: OTOLARYNGOLOGY

## 2022-10-24 PROCEDURE — 6360000002 HC RX W HCPCS: Performed by: OTOLARYNGOLOGY

## 2022-10-24 PROCEDURE — C1767 GENERATOR, NEURO NON-RECHARG: HCPCS | Performed by: OTOLARYNGOLOGY

## 2022-10-24 PROCEDURE — 2580000003 HC RX 258: Performed by: OTOLARYNGOLOGY

## 2022-10-24 PROCEDURE — 7100000011 HC PHASE II RECOVERY - ADDTL 15 MIN: Performed by: OTOLARYNGOLOGY

## 2022-10-24 PROCEDURE — C1787 PATIENT PROGR, NEUROSTIM: HCPCS | Performed by: OTOLARYNGOLOGY

## 2022-10-24 PROCEDURE — 2580000003 HC RX 258: Performed by: ANESTHESIOLOGY

## 2022-10-24 PROCEDURE — 2500000003 HC RX 250 WO HCPCS: Performed by: OTOLARYNGOLOGY

## 2022-10-24 PROCEDURE — 3700000000 HC ANESTHESIA ATTENDED CARE: Performed by: OTOLARYNGOLOGY

## 2022-10-24 PROCEDURE — 71045 X-RAY EXAM CHEST 1 VIEW: CPT

## 2022-10-24 PROCEDURE — A4217 STERILE WATER/SALINE, 500 ML: HCPCS | Performed by: OTOLARYNGOLOGY

## 2022-10-24 PROCEDURE — 3600000014 HC SURGERY LEVEL 4 ADDTL 15MIN: Performed by: OTOLARYNGOLOGY

## 2022-10-24 PROCEDURE — 3600000004 HC SURGERY LEVEL 4 BASE: Performed by: OTOLARYNGOLOGY

## 2022-10-24 PROCEDURE — 2720000010 HC SURG SUPPLY STERILE: Performed by: OTOLARYNGOLOGY

## 2022-10-24 DEVICE — LEAD STIMULATION UPPER AIRWAY INSPIRE: Type: IMPLANTABLE DEVICE | Site: CHEST | Status: FUNCTIONAL

## 2022-10-24 DEVICE — GENERATOR PULSE IMPLANTABLE INSPIRE: Type: IMPLANTABLE DEVICE | Site: CHEST | Status: FUNCTIONAL

## 2022-10-24 DEVICE — LEAD SENSING RESPIRATORY INSPIRE: Type: IMPLANTABLE DEVICE | Site: CHEST | Status: FUNCTIONAL

## 2022-10-24 RX ORDER — HYDROCODONE BITARTRATE AND ACETAMINOPHEN 5; 325 MG/1; MG/1
1 TABLET ORAL EVERY 6 HOURS PRN
Qty: 10 TABLET | Refills: 0 | Status: SHIPPED | OUTPATIENT
Start: 2022-10-24 | End: 2022-10-27

## 2022-10-24 RX ORDER — SODIUM CHLORIDE, SODIUM LACTATE, POTASSIUM CHLORIDE, CALCIUM CHLORIDE 600; 310; 30; 20 MG/100ML; MG/100ML; MG/100ML; MG/100ML
INJECTION, SOLUTION INTRAVENOUS CONTINUOUS
Status: DISCONTINUED | OUTPATIENT
Start: 2022-10-24 | End: 2022-10-24 | Stop reason: HOSPADM

## 2022-10-24 RX ORDER — SODIUM CHLORIDE 0.9 % (FLUSH) 0.9 %
5-40 SYRINGE (ML) INJECTION PRN
Status: DISCONTINUED | OUTPATIENT
Start: 2022-10-24 | End: 2022-10-24 | Stop reason: HOSPADM

## 2022-10-24 RX ORDER — CEPHALEXIN 500 MG/1
500 CAPSULE ORAL 3 TIMES DAILY
Qty: 15 CAPSULE | Refills: 0 | Status: SHIPPED | OUTPATIENT
Start: 2022-10-24 | End: 2022-10-29

## 2022-10-24 RX ORDER — LIDOCAINE HYDROCHLORIDE AND EPINEPHRINE 10; 10 MG/ML; UG/ML
INJECTION, SOLUTION INFILTRATION; PERINEURAL PRN
Status: DISCONTINUED | OUTPATIENT
Start: 2022-10-24 | End: 2022-10-24 | Stop reason: ALTCHOICE

## 2022-10-24 RX ORDER — HYDRALAZINE HYDROCHLORIDE 20 MG/ML
10 INJECTION INTRAMUSCULAR; INTRAVENOUS
Status: DISCONTINUED | OUTPATIENT
Start: 2022-10-24 | End: 2022-10-24 | Stop reason: HOSPADM

## 2022-10-24 RX ORDER — LIDOCAINE HYDROCHLORIDE 10 MG/ML
0.3 INJECTION, SOLUTION EPIDURAL; INFILTRATION; INTRACAUDAL; PERINEURAL
Status: DISCONTINUED | OUTPATIENT
Start: 2022-10-24 | End: 2022-10-24 | Stop reason: HOSPADM

## 2022-10-24 RX ORDER — SODIUM CHLORIDE 0.9 % (FLUSH) 0.9 %
5-40 SYRINGE (ML) INJECTION EVERY 12 HOURS SCHEDULED
Status: DISCONTINUED | OUTPATIENT
Start: 2022-10-24 | End: 2022-10-24 | Stop reason: HOSPADM

## 2022-10-24 RX ORDER — IPRATROPIUM BROMIDE AND ALBUTEROL SULFATE 2.5; .5 MG/3ML; MG/3ML
1 SOLUTION RESPIRATORY (INHALATION)
Status: DISCONTINUED | OUTPATIENT
Start: 2022-10-24 | End: 2022-10-24 | Stop reason: HOSPADM

## 2022-10-24 RX ORDER — OXYCODONE HYDROCHLORIDE 5 MG/1
5 TABLET ORAL
Status: DISCONTINUED | OUTPATIENT
Start: 2022-10-24 | End: 2022-10-24 | Stop reason: HOSPADM

## 2022-10-24 RX ORDER — SODIUM CHLORIDE 9 MG/ML
INJECTION, SOLUTION INTRAVENOUS PRN
Status: DISCONTINUED | OUTPATIENT
Start: 2022-10-24 | End: 2022-10-24 | Stop reason: HOSPADM

## 2022-10-24 RX ORDER — ACETAMINOPHEN 325 MG/1
650 TABLET ORAL
Status: DISCONTINUED | OUTPATIENT
Start: 2022-10-24 | End: 2022-10-24 | Stop reason: HOSPADM

## 2022-10-24 RX ORDER — MEPERIDINE HYDROCHLORIDE 50 MG/ML
12.5 INJECTION INTRAMUSCULAR; INTRAVENOUS; SUBCUTANEOUS EVERY 5 MIN PRN
Status: DISCONTINUED | OUTPATIENT
Start: 2022-10-24 | End: 2022-10-24 | Stop reason: HOSPADM

## 2022-10-24 RX ORDER — LIDOCAINE HYDROCHLORIDE 20 MG/ML
INJECTION, SOLUTION EPIDURAL; INFILTRATION; INTRACAUDAL; PERINEURAL PRN
Status: DISCONTINUED | OUTPATIENT
Start: 2022-10-24 | End: 2022-10-24 | Stop reason: SDUPTHER

## 2022-10-24 RX ORDER — MAGNESIUM HYDROXIDE 1200 MG/15ML
LIQUID ORAL CONTINUOUS PRN
Status: COMPLETED | OUTPATIENT
Start: 2022-10-24 | End: 2022-10-24

## 2022-10-24 RX ORDER — ONDANSETRON 2 MG/ML
4 INJECTION INTRAMUSCULAR; INTRAVENOUS
Status: DISCONTINUED | OUTPATIENT
Start: 2022-10-24 | End: 2022-10-24 | Stop reason: HOSPADM

## 2022-10-24 RX ORDER — DEXAMETHASONE SODIUM PHOSPHATE 10 MG/ML
INJECTION INTRAMUSCULAR; INTRAVENOUS PRN
Status: DISCONTINUED | OUTPATIENT
Start: 2022-10-24 | End: 2022-10-24 | Stop reason: SDUPTHER

## 2022-10-24 RX ORDER — PROPOFOL 10 MG/ML
INJECTION, EMULSION INTRAVENOUS PRN
Status: DISCONTINUED | OUTPATIENT
Start: 2022-10-24 | End: 2022-10-24 | Stop reason: SDUPTHER

## 2022-10-24 RX ORDER — DIPHENHYDRAMINE HYDROCHLORIDE 50 MG/ML
12.5 INJECTION INTRAMUSCULAR; INTRAVENOUS
Status: DISCONTINUED | OUTPATIENT
Start: 2022-10-24 | End: 2022-10-24 | Stop reason: HOSPADM

## 2022-10-24 RX ORDER — FENTANYL CITRATE 50 UG/ML
INJECTION, SOLUTION INTRAMUSCULAR; INTRAVENOUS PRN
Status: DISCONTINUED | OUTPATIENT
Start: 2022-10-24 | End: 2022-10-24 | Stop reason: SDUPTHER

## 2022-10-24 RX ORDER — ONDANSETRON 2 MG/ML
INJECTION INTRAMUSCULAR; INTRAVENOUS PRN
Status: DISCONTINUED | OUTPATIENT
Start: 2022-10-24 | End: 2022-10-24 | Stop reason: SDUPTHER

## 2022-10-24 RX ORDER — SUCCINYLCHOLINE CHLORIDE 20 MG/ML
INJECTION INTRAMUSCULAR; INTRAVENOUS PRN
Status: DISCONTINUED | OUTPATIENT
Start: 2022-10-24 | End: 2022-10-24 | Stop reason: SDUPTHER

## 2022-10-24 RX ORDER — MIDAZOLAM HYDROCHLORIDE 1 MG/ML
2 INJECTION INTRAMUSCULAR; INTRAVENOUS
Status: DISCONTINUED | OUTPATIENT
Start: 2022-10-24 | End: 2022-10-24 | Stop reason: HOSPADM

## 2022-10-24 RX ORDER — DEXAMETHASONE SODIUM PHOSPHATE 10 MG/ML
4 INJECTION INTRAMUSCULAR; INTRAVENOUS
Status: DISCONTINUED | OUTPATIENT
Start: 2022-10-24 | End: 2022-10-24 | Stop reason: HOSPADM

## 2022-10-24 RX ADMIN — LIDOCAINE HYDROCHLORIDE 60 MG: 20 INJECTION, SOLUTION EPIDURAL; INFILTRATION; INTRACAUDAL; PERINEURAL at 14:20

## 2022-10-24 RX ADMIN — DEXAMETHASONE SODIUM PHOSPHATE 5 MG: 10 INJECTION INTRAMUSCULAR; INTRAVENOUS at 14:35

## 2022-10-24 RX ADMIN — CEFAZOLIN 2000 MG: 10 INJECTION, POWDER, FOR SOLUTION INTRAVENOUS at 14:23

## 2022-10-24 RX ADMIN — SODIUM CHLORIDE, SODIUM LACTATE, POTASSIUM CHLORIDE, AND CALCIUM CHLORIDE: .6; .31; .03; .02 INJECTION, SOLUTION INTRAVENOUS at 14:14

## 2022-10-24 RX ADMIN — FENTANYL CITRATE 50 MCG: 50 INJECTION INTRAMUSCULAR; INTRAVENOUS at 14:20

## 2022-10-24 RX ADMIN — PROPOFOL 150 MG: 10 INJECTION, EMULSION INTRAVENOUS at 14:20

## 2022-10-24 RX ADMIN — SODIUM CHLORIDE, SODIUM LACTATE, POTASSIUM CHLORIDE, AND CALCIUM CHLORIDE: .6; .31; .03; .02 INJECTION, SOLUTION INTRAVENOUS at 15:18

## 2022-10-24 RX ADMIN — FENTANYL CITRATE 25 MCG: 50 INJECTION INTRAMUSCULAR; INTRAVENOUS at 15:05

## 2022-10-24 RX ADMIN — SUCCINYLCHOLINE CHLORIDE 180 MG: 20 INJECTION, SOLUTION INTRAMUSCULAR; INTRAVENOUS at 14:20

## 2022-10-24 RX ADMIN — ONDANSETRON 4 MG: 2 INJECTION INTRAMUSCULAR; INTRAVENOUS at 14:35

## 2022-10-24 RX ADMIN — FENTANYL CITRATE 25 MCG: 50 INJECTION INTRAMUSCULAR; INTRAVENOUS at 14:43

## 2022-10-24 ASSESSMENT — PAIN - FUNCTIONAL ASSESSMENT: PAIN_FUNCTIONAL_ASSESSMENT: 0-10

## 2022-10-24 NOTE — BRIEF OP NOTE
Brief Postoperative Note      Patient: Trinidad Waggoner. YOB: 1955  MRN: [id number]    Date of Procedure: 10/24/2022    Pre-Op Diagnosis: Obstructive sleep apnea [G47.33]    Post-Op Diagnosis: Same       Procedure(s):  INSPIRE DEVICE PLACEMENT    Surgeon(s):  Micki Suarez DO    Assistant:  Surgical Assistant: Rosalia Gonzales    Anesthesia: General    Estimated Blood Loss (mL): Minimal    Complications: None    Specimens:   * No specimens in log *    Implants:  Implant Name Type Inv.  Item Serial No.  Lot No. LRB No. Used Action   GENERATOR PULSE IMPLANTABLE INSPIRE - [id number]  GENERATOR PULSE IMPLANTABLE INSPIRE [id number] [employer]  N/A 1 Implanted   LEAD SENSING RESPIRATORY INSPIRE - [id number] Implantable long term continuous electrocardiography EKG system LEAD SENSING RESPIRATORY INSPIRE [id number] [employer]  N/A 1 Implanted   LEAD STIMULATION UPPER AIRWAY INSPIRE - [id number]  LEAD STIMULATION UPPER AIRWAY INSPIRE [id number] [employer]  N/A 1 Implanted         Drains: * No LDAs found *    Findings: successful implantation of inspire    Electronically signed by Micki Suarez DO on 10/24/2022 at 3:27 PM

## 2022-10-24 NOTE — DISCHARGE INSTRUCTIONS
[address], [address] [facility]  [phone]    POST-OP Instructions for Neck Operations  Diet  Resume regular diet, as tolerated. You may experience some nausea after surgery for up to 24 hours from the general anesthetic. Take any pain medications with food to avoid upset stomach   Drink plenty of liquids    Activity  Avoid Straining, bending or lifting or vigorous exercise for 1 weeks  Keep the head of your bed elevated to reduce swelling for the first 72 hours  May shower 24 hours after surgery. Let water run over the incision, do not scrub. Pat dry  Start neck rolls the day after surgery- 10 to the left and right 3 times a day   Do not lift your right arm above your shoulder for 1 month after surgery    General Instructions  Drainage from the incision during the first few days is expected. If you have excessive bleeding or green drainage with surrounding redness please call the number above. Swelling at incision sites is expected and will typically improve over the first 2 weeks. Most patients can expect some swelling under the jaw that will give the appearance of a double chin.  This will improve over 2-4 weeks. Medications  Resume your normal medications  Take antibiotics prescribed  Take pain medications as needed for pain  DO NOT take aspirin or aspirin products. NO Advil, Motrin, Aleve, Ibuprofen for two weeks following surgery. DO NOT use any herbal medicines/diet pills for two weeks after surgery. Call the Office [phone]  Fever greater than 100.4  Sudden swelling in neck causing difficulty swallowing or breathing is an emergency.     Sudden increase in pain        ANESTHESIA DISCHARGE INSTRUCTIONS    You are under the influence of drugs- do not drink alcohol, drive a car, operate machinery(such as power tools, kitchen appliances, etc), sign legal documents, or make any important decisions for 24 hours (or while on pain medications). Children should not ride bikes or [place] or play on gym sets  for 24 hours after surgery. A responsible adult should be with you for 24 hours. Rest at home today- increase activity as tolerated. Progress slowly to a regular diet unless your physician has instructed you otherwise. Drink plenty of water. CALL YOUR DOCTOR IF YOU:  Have moderate to severe nausea or vomiting AND are unable to hold down fluids or prescribed medications. Have bright red bloody drainage from your dressing that won't stop oozing. Do not get relief with your pain medication    NORMAL (POSSIBLE) SIDE EFFECTS FROM ANESTHESIA:     Confusion, temporary memory loss, delayed reaction times in the first 24 hours  Lightheadedness, dizziness, difficulty focusing, blurred vision  Nausea/vomiting can happen  Shivering, feeling cold, sore throat, cough and muscle aches should stop within 24-48 hours  Trouble urinating - call your surgeon if it has been more than 8 hrs  Bruising or soreness at the IV site - call if it remains red, firm or there is drainage             FEMALES OF CHILDBEARING AGE WHO ARE TAKING BIRTH CONTROL PILLS:  You may have received a medication during your procedure that interferes with the   actions of birth control pills (Bridion or Emend). Use some other kind of birth control in addition to your pills, like a condom, for 1 month after your procedure to prevent unwanted pregnancy. The following instructions are to be followed if you have a known history or diagnosis of sleep apnea: For all sleep apnea patients:  ? Sleep on your side or sitting up in a chair whenever possible, especially the first 24 hours after surgery. ? Use only medicines prescribed by your doctor. ? Do not drink alcohol. ? If you have a dental device to assist you while at rest, use it at all times for the first 24 hours.   For patients using CPAP machines:  ? Use your CPAP machine during all periods of sleep as usual.  ? Use your CPAP machine during all periods of daytime rest while on pain medicines. ** Follow up with your primary care doctor for continued care. IF YOU DO NOT TAKE ALL OF YOUR NARCOTIC PAIN MEDICATION, please dispose of them responsibly. There are drop off boxes in the Emergency Departments 24/7 at both [facility] and [place]. If these locations are not convenient, other options for discarding them can be found at:  [email or web address]    Hospital or office staff may NOT accept any medications to drop off in the cabinet for you. What is a Surgical Site Infection or  (SSI)? A surgical site infection (SSI) is an infection that occurs after surgery in the part of the body where the surgery took place. Most patients who have surgery do not develop an infection. However, infections can develop in about 1-3 cases for every 100 patients who have had surgery. Our goal is for you to NOT experience any complications and be completely satisfied with your care! However, some signs or symptoms to look for and report immediately to your doctor are:   1. Fever above 101 degrees    2. Redness and increasing pain around the area  where you had surgery   3. Drainage of cloudy fluid or pus coming from the surgical area    Some of the things we/ you can do to prevent SSI's are:   1. Clean hands with soap and water or an alcohol-based hand rub before and after caring for the operative area. This occurs the day of surgery and for the next 2 weeks. 2.Sometimes you receive an appropriate antibiotic within 60 minutes before your surgery or take one for several days after surgery depending on your surgeon's instructions and/or the type of surgery you are having. 3. Family and/or friends who visit you should NOT touch the surgical wound or dressings until advised by your surgeon. 4. Be sure to elevate and decrease the swelling after your surgery to help prevent infection.    5. If you are a diabetic, you need to closely monitor your blood sugar levels and report any significant increases or changes to your surgeon to help promote the healing process.

## 2022-10-24 NOTE — OP NOTE
Otolaryngology-Head and Neck Surgery   Operative Note   Date of Operation:   10/24/22    Pre-operative Diagnosis:   Obstructive sleep apnea [G47.33]     Post-operative Diagnosis:   Obstructive sleep apnea    Operative Procedure:   Procedure(s):  INSPIRE DEVICE PLACEMENT (N/A)     Attending Surgeon:   Gabrielle Hernandez DO    OR Staff:   Circulator: Damian Roberto RN  Surgical Assistant: Carmella Gamino  Scrub Person First: Elo Fairchild RN     Assistant:   None     Anesthesia Staff:   Anesthesiologist: Liz Huff MD  CRNA: EBEN Jefferson CRNA     Anesthesia Type:   General     Estimated Blood Loss:   5mL    Operative Complications:   none    Specimens:   * No specimens in log *     Implants:   Implant Name Type Inv. Item Serial No.  Lot No. LRB No. Used Action   GENERATOR PULSE IMPLANTABLE INSPIRE - [id number]  GENERATOR PULSE IMPLANTABLE INSPIRE [id number] [employer]  N/A 1 Implanted   LEAD SENSING RESPIRATORY INSPIRE - [id number] Implantable long term continuous electrocardiography EKG system LEAD SENSING RESPIRATORY INSPIRE [id number] [employer]  N/A 1 Implanted   LEAD STIMULATION UPPER AIRWAY INSPIRE - [id number]  LEAD STIMULATION UPPER AIRWAY INSPIRE [id number] [employer]  N/A 1 Implanted        Operative Findings:   Successful implantation of Inspire Device    Disposition of Patient:  Stable to PACU    Operative Indications: 75 y.o. female with a past history significant for obstructive sleep apnea, and intolerance to CPAP after at least 3-month trial.  Sleep endoscopy demonstrated anterior posterior collapse. BMI 31. Patient was approved by [employer]. Risk benefits alternatives discussed with patient in detail. All questions answered. OPERATIVE PROCEDURE: The patient was brought to the Operating Room and was anesthetized via general endotracheal anesthesia without complication.  A shoulder roll was placed and the patient was prepped and draped in usual sterile fashion with the head turned to the left. Prior to prepping and draping, electrodes were placed in the genioglossus and styloglossus muscle and connected to the NIM box for intraoperative nerve monitoring. A 4 cm incision was made in the right upper neck approximately 1 cm from midline midway between mandible and hyoid bone. Dissection was carried down through the subcutaneous tissue and platysma. The inferior border of the submandibular gland was identified as well as the digastric tendon. The submandibular gland and the overlying fascia with the marginal mandibular nerve were retracted superiorly. The digastric was retracted inferiorly with vessel loops. Dissection was carried down into the digastric triangle where the hypoglossal nerve was identified in its usual fashion. The mylohyoid muscle was retracted anteriorly, and the hypoglossal nerve was dissected up towards the floor of the mouth. The lateral branches to retrusor muscles were identified, and tested intra-operatively using the NIM stimulator. The cuff electrode for the hypoglossal nerve stimulator was placed distally to these branches on the medial nerve branch to the genioglossus muscle. Diagnostic evaluation confirmed activation of the genioglossus nerve, resulting in genioglossal activation and tongue protrusion, confirmed visually. The nerve cuff was flushed with normal saline and the cuff electrode was secured to the digastric tendon 2 3-0 silk sutures. A second 5 cm incision was made in the right upper chest 1 cm from sternal edge overlying the 2nd/3rd intercostal space. Dissection was carried down to the pectoralis muscle. Blunt dissection was used to develop a pocket superficial to the pectoralis fascia. 2 x 2-0 silk sutures were secured then to pectoralis muscle on the lateral and medial aspects of the incision 4 cm apart.   Blunt dissection was then used through the pectoralis muscle into the fatty later deep to the pectoralis muscle. This fatty layer was  removed with use of kittner exposing the external intercostal fascia. A DeBakey was used to lift the external intercostal fibers Winn was used to create an opening down to the internal intercostal fascial plane. The sensor lead was then placed into the opening. This was secured with three 3-0 silk sutures using the primary anchor to the external intercostal fibers. The secondary anchor was then secured with two 3-0 silk sutures to the superficial pectoralis major muscle. The stimulation lead was then tunneled from the neck in a subplatysmal plane and brought out into the pocket leaving 3 cm strain relief in the neck. The leads were  connected to the device using the 2 person 3 handed technique after ensuring all the connectors were clean and dry. The device was then tested using the . Tongue protrusion was tested at 1 V and in decreasing voltages until the tongue did not move. Diagnostic evaluation was run, which confirmed a good respiration sensing signal as well as good tongue protrusion stimulation. The IPG was placed in the subclavicular pocket and secured loosely to the pectoralis fascia using the previously placed 2-0 silk suture. The wounds were then closed in three layers with deep 3-0 Vicryl and a 4-0 running subcuticular monocryl. Dermabond was applied. The patient was then awakened, extubated, and transferred to Recovery Room in stable condition.      Electronically signed by Jaiden Melvin DO on 10/24/2022 at 3:27 PM

## 2022-10-24 NOTE — ANESTHESIA PRE PROCEDURE
Department of Anesthesiology  Preprocedure Note       Name:  Charles Hare. Age:  75 y.o.  :  1955                                          MRN:  [id number]         Date:  10/24/2022      Surgeon: Katia Herrera):  Stephanie Vera DO    Procedure: Procedure(s):  INSPIRE DEVICE PLACEMENT    Medications prior to admission:   Prior to Admission medications    Medication Sig Start Date End Date Taking?  Authorizing Provider   metFORMIN (GLUCOPHAGE-XR) 750 MG extended release tablet TAKE 2 TABLETS BY MOUTH DAILY WITH BREAKFAST 22   Blaise Osler, DO   TRULICITY 3 FH/2.3RB SOPN INJECT 3MG INTO THE SKIN ONCE A WEEK 22   Blaise Osler, DO   FARXIGA 10 MG tablet TAKE 1 TABLET BY MOUTH EVERY MORNING 8/15/22   Blaise Osler, DO   losartan (COZAAR) 50 MG tablet Take 1 tablet by mouth daily 22   Blaise Osler, DO   Accu-Chek FastClix Lancets MISC USE AS DIRECTED TWICE DAILY 22   Blaise Osler, DO   blood glucose test strips (ACCU-CHEK GUIDE) strip TEST TWICE DAILY AND AS NEEDED FOR SYMPTOMS OF IRREGULAR BLOOD GLUCOSE 22   Blaise Osler, DO   ranolazine (RANEXA) 500 MG extended release tablet TAKE 1 TABLET BY MOUTH TWICE DAILY 22   Blaise Osler, DO   carvedilol (COREG) 6.25 MG tablet Take 1 tablet by mouth 2 times daily 22   Sierra Arevalo MD   omeprazole (PRILOSEC) 20 MG delayed release capsule Take 1 capsule by mouth Daily 21   Blaise Osler, DO   glipiZIDE (GLUCOTROL XL) 5 MG extended release tablet TAKE 1 TABLET BY MOUTH DAILY 10/25/21   Blaise Osler, DO   blood glucose monitor kit and supplies Check sugars BID and prn 21   Blaise Osler, DO   aspirin 81 MG chewable tablet Take 81 mg by mouth daily 13   Historical Provider, MD       Current medications:    Current Facility-Administered Medications   Medication Dose Route Frequency Provider Last Rate Last Admin    lidocaine PF 1 % injection 0.3 mL  0.3 mL IntraDERmal Once PRN Melisa Segura MD        lactated ringers infusion   IntraVENous Continuous Melisa Segura MD        sodium chloride flush 0.9 % injection 5-40 mL  5-40 mL IntraVENous 2 times per day Melisa Segura MD        sodium chloride flush 0.9 % injection 5-40 mL  5-40 mL IntraVENous PRN Melisa Segura MD        0.9 % sodium chloride infusion   IntraVENous PRN Melisa Segura MD        sodium chloride flush 0.9 % injection 5-40 mL  5-40 mL IntraVENous 2 times per day Marge Gale DO        sodium chloride flush 0.9 % injection 5-40 mL  5-40 mL IntraVENous PRN Marge Gale, DO        0.9 % sodium chloride infusion   IntraVENous PRN Marge Gale, DO        ceFAZolin (ANCEF) 2000 mg in dextrose 5 % 100 mL IVPB  2,000 mg IntraVENous On Call to [address] [place] [address],            Allergies:     Allergies   Allergen Reactions    Influenza Vaccines Rash    Lisinopril Rash       Problem List:    Patient Active Problem List   Diagnosis Code    Coronary artery disease involving native coronary artery of native heart without angina pectoris I25.10    Essential hypertension I10    Hyperlipidemia LDL goal <70 E78.5    History of colon polyps Z86.010    Type 2 diabetes mellitus with microalbuminuria, without long-term current use of insulin (HCC) E11.29, R80.9    History of myocardial infarction I25.2    Tobacco use Z72.0    Sudden visual loss of left eye H53.132    Weakness of both hips R29.898    Vaccine contraindicated Z28.09    Optic neuritis H46.9    Erectile dysfunction N52.9    Obstructive sleep apnea syndrome G47.33    Non morbid obesity, unspecified obesity type E66.9    Acute pain of left shoulder M25.512    Cognitive impairment R41.89       Past Medical History:        Diagnosis Date    Acid reflux     CAD (coronary artery disease)     Hyperlipidemia     Hypertension     MI [facility]ocardial infarction) ([facility] [address] 2014    Obstructive sleep apnea syndrome 05/18/2021    Type 2 diabetes mellitus without complication [facility])        Past Surgical History:        Procedure Laterality Date    CARPAL TUNNEL RELEASE Left     COLONOSCOPY      FOOT SURGERY Right 2020    fracture    LARYNGOSCOPY N/A 2022    DRUG INDUCED SLEEP ENDOSCOPY performed by Pawel Shepard DO at [facility] PTCA      PTCA/Stent x 1 vessel. following MI    ULNAR NERVE REPAIR Left        Social History:    Social History     Tobacco Use    Smoking status: Former     Packs/day: 1.00     Years: 40.00     Pack years: 40.00     Types: Cigarettes     Start date: 0     Quit date: 2020     Years since quittin.3    Smokeless tobacco: Never   Substance Use Topics    Alcohol use: Not Currently                                Counseling given: Not Answered      Vital Signs (Current):   Vitals:    10/19/22 1033 10/24/22 1049   BP:  115/66   Pulse:  79   Resp:  16   Temp:  97.6 °F (36.4 °C)   SpO2:  95%   Weight: 250 lb (113.4 kg) 250 lb (113.4 kg)   Height: 6' 3\" (1.905 m) 6' 3\" (1.905 m)                                              BP Readings from Last 3 Encounters:   10/24/22 115/66   10/10/22 (!) 110/58   22 114/82       NPO Status: Time of last liquid consumption:                         Time of last solid consumption:                         Date of last liquid consumption: 10/23/22                        Date of last solid food consumption: 10/23/22    BMI:   Wt Readings from Last 3 Encounters:   10/24/22 250 lb (113.4 kg)   10/10/22 257 lb 6.4 oz (116.8 kg)   22 250 lb (113.4 kg)     Body mass index is 31.25 kg/m².     CBC:   Lab Results   Component Value Date/Time    WBC 8.8 10/10/2022 03:35 PM    RBC 4.38 10/10/2022 03:35 PM    HGB 13.0 10/10/2022 03:35 PM    HCT 40.1 10/10/2022 03:35 PM    MCV 91.5 10/10/2022 03:35 PM    RDW 15.0 10/10/2022 03:35 PM     10/10/2022 03:35 PM       CMP:   Lab Results   Component Value Date/Time     10/21/2022 10:25 AM    K 4.9 10/21/2022 10:25 AM CL 98 10/21/2022 10:25 AM    CO2 29 10/21/2022 10:25 AM    BUN 19 10/21/2022 10:25 AM    CREATININE 1.1 10/21/2022 10:25 AM    CREATININE 1.1 04/19/2021 12:00 AM    GFRAA >60 10/10/2022 03:35 PM    AGRATIO 1.7 10/29/2020 03:36 PM    LABGLOM 55 10/10/2022 03:35 PM    GLUCOSE 149 10/21/2022 10:25 AM    GLUCOSE 124 08/31/2022 09:47 AM    PROT 7.1 10/21/2022 10:25 AM    CALCIUM 10.0 10/21/2022 10:25 AM    BILITOT 0.4 10/21/2022 10:25 AM    ALKPHOS 76 10/21/2022 10:25 AM    AST 25 10/21/2022 10:25 AM    ALT 39 10/21/2022 10:25 AM       POC Tests:   Recent Labs     10/24/22  1059   POCGLU 139*       Coags: No results found for: PROTIME, INR, APTT    HCG (If Applicable): No results found for: PREGTESTUR, PREGSERUM, HCG, HCGQUANT     ABGs: No results found for: PHART, PO2ART, QNY4IWX, GJN1BOQ, BEART, G0HMZAQP     Type & Screen (If Applicable):  No results found for: LABABO, LABRH    Drug/Infectious Status (If Applicable):  No results found for: HIV, HEPCAB    COVID-19 Screening (If Applicable): No results found for: COVID19        Anesthesia Evaluation  Patient summary reviewed and Nursing notes reviewed  Airway: Mallampati: II  TM distance: >3 FB   Neck ROM: full  Mouth opening: > = 3 FB   Dental:    (+) edentulous      Pulmonary:normal exam    (+) sleep apnea:                             Cardiovascular:    (+) hypertension:, past MI:, CAD:, CABG/stent:,                   Neuro/Psych:   Negative Neuro/Psych ROS              GI/Hepatic/Renal:   (+) GERD:,           Endo/Other:    (+) DiabetesType II DM, , .                 Abdominal:             Vascular: negative vascular ROS. Other Findings:           Anesthesia Plan      general     ASA 3       Induction: intravenous. MIPS: Postoperative opioids intended. Anesthetic plan and risks discussed with patient. Plan discussed with CRNA.     Attending anesthesiologist reviewed and agrees with Luiza Medina MD 10/24/2022

## 2022-10-24 NOTE — ANESTHESIA POSTPROCEDURE EVALUATION
Department of Anesthesiology  Postprocedure Note    Patient: Faith Herzog MRN: [id number]  YOB: 1955  Date of evaluation: 10/24/2022      Procedure Summary     Date: 10/24/22 Room / Location: [address]    Anesthesia Start: 3567 Anesthesia Stop: 8787    Procedure: R [address] (Chest) Diagnosis:       Obstructive sleep apnea      (Obstructive sleep apnea [G47.33])    Surgeons: Neal Case DO Responsible Provider: Asher Wells MD    Anesthesia Type: general ASA Status: 3          Anesthesia Type: No value filed.     Houston Phase I: Houston Score: 8    Houston Phase II: Houston Score: 10      Anesthesia Post Evaluation    Patient location during evaluation: PACU  Level of consciousness: awake  Airway patency: patent  Nausea & Vomiting: no nausea  Complications: no  Cardiovascular status: blood pressure returned to baseline  Respiratory status: acceptable  Hydration status: euvolemic

## 2022-10-24 NOTE — PROGRESS NOTES
Xrays completed. Wife updated in waiting room. Discharge instructions reviewed with patient and responsible adult. Discharge instructions signed and copy given with no additional questions. Patient to be discharged home with belongings.

## 2022-10-24 NOTE — INTERVAL H&P NOTE
Update History & Physical    The patient's History and Physical of October 10, 2022 was reviewed with the patient and I examined the patient. There was no change. The surgical site was confirmed by the patient and me. Plan: The risks, benefits, expected outcome, and alternative to the recommended procedure have been discussed with the patient. Patient understands and wants to proceed with the procedure.      Electronically signed by Catracho Kaiser DO on 10/24/2022 at 1:42 PM

## 2022-11-01 ENCOUNTER — OFFICE VISIT (OUTPATIENT)
Dept: INTERNAL MEDICINE CLINIC | Age: 67
End: 2022-11-01
Payer: MEDICARE

## 2022-11-01 VITALS
BODY MASS INDEX: 32.57 KG/M2 | WEIGHT: 260.6 LBS | OXYGEN SATURATION: 94 % | SYSTOLIC BLOOD PRESSURE: 110 MMHG | HEART RATE: 76 BPM | DIASTOLIC BLOOD PRESSURE: 76 MMHG

## 2022-11-01 DIAGNOSIS — Z11.59 NEED FOR HEPATITIS C SCREENING TEST: ICD-10-CM

## 2022-11-01 DIAGNOSIS — Z00.00 INITIAL MEDICARE ANNUAL WELLNESS VISIT: Primary | ICD-10-CM

## 2022-11-01 DIAGNOSIS — Z72.89 OTHER PROBLEMS RELATED TO LIFESTYLE: ICD-10-CM

## 2022-11-01 DIAGNOSIS — R41.89 COGNITIVE IMPAIRMENT: ICD-10-CM

## 2022-11-01 DIAGNOSIS — R80.9 TYPE 2 DIABETES MELLITUS WITH MICROALBUMINURIA, WITHOUT LONG-TERM CURRENT USE OF INSULIN (HCC): ICD-10-CM

## 2022-11-01 DIAGNOSIS — I25.2 HISTORY OF MYOCARDIAL INFARCTION: ICD-10-CM

## 2022-11-01 DIAGNOSIS — E78.5 HYPERLIPIDEMIA LDL GOAL <70: ICD-10-CM

## 2022-11-01 DIAGNOSIS — E11.29 TYPE 2 DIABETES MELLITUS WITH MICROALBUMINURIA, WITHOUT LONG-TERM CURRENT USE OF INSULIN (HCC): ICD-10-CM

## 2022-11-01 DIAGNOSIS — Z87.891 PERSONAL HISTORY OF TOBACCO USE: ICD-10-CM

## 2022-11-01 DIAGNOSIS — Z13.6 SCREENING FOR AAA (ABDOMINAL AORTIC ANEURYSM): ICD-10-CM

## 2022-11-01 DIAGNOSIS — I10 ESSENTIAL HYPERTENSION: ICD-10-CM

## 2022-11-01 DIAGNOSIS — I25.10 CORONARY ARTERY DISEASE INVOLVING NATIVE CORONARY ARTERY OF NATIVE HEART WITHOUT ANGINA PECTORIS: ICD-10-CM

## 2022-11-01 DIAGNOSIS — Z28.09 VACCINE CONTRAINDICATED: ICD-10-CM

## 2022-11-01 DIAGNOSIS — Z72.0 TOBACCO USE: ICD-10-CM

## 2022-11-01 PROCEDURE — 99214 OFFICE O/P EST MOD 30 MIN: CPT | Performed by: INTERNAL MEDICINE

## 2022-11-01 PROCEDURE — 3078F DIAST BP <80 MM HG: CPT | Performed by: INTERNAL MEDICINE

## 2022-11-01 PROCEDURE — 3044F HG A1C LEVEL LT 7.0%: CPT | Performed by: INTERNAL MEDICINE

## 2022-11-01 PROCEDURE — G8417 CALC BMI ABV UP PARAM F/U: HCPCS | Performed by: INTERNAL MEDICINE

## 2022-11-01 PROCEDURE — 2022F DILAT RTA XM EVC RTNOPTHY: CPT | Performed by: INTERNAL MEDICINE

## 2022-11-01 PROCEDURE — 1036F TOBACCO NON-USER: CPT | Performed by: INTERNAL MEDICINE

## 2022-11-01 PROCEDURE — 3017F COLORECTAL CA SCREEN DOC REV: CPT | Performed by: INTERNAL MEDICINE

## 2022-11-01 PROCEDURE — G0438 PPPS, INITIAL VISIT: HCPCS | Performed by: INTERNAL MEDICINE

## 2022-11-01 PROCEDURE — 3074F SYST BP LT 130 MM HG: CPT | Performed by: INTERNAL MEDICINE

## 2022-11-01 PROCEDURE — G8427 DOCREV CUR MEDS BY ELIG CLIN: HCPCS | Performed by: INTERNAL MEDICINE

## 2022-11-01 PROCEDURE — 1123F ACP DISCUSS/DSCN MKR DOCD: CPT | Performed by: INTERNAL MEDICINE

## 2022-11-01 PROCEDURE — G8484 FLU IMMUNIZE NO ADMIN: HCPCS | Performed by: INTERNAL MEDICINE

## 2022-11-01 RX ORDER — CARVEDILOL 6.25 MG/1
6.25 TABLET ORAL 2 TIMES DAILY
Qty: 180 TABLET | Refills: 3 | Status: SHIPPED | OUTPATIENT
Start: 2022-11-01

## 2022-11-01 ASSESSMENT — PATIENT HEALTH QUESTIONNAIRE - PHQ9
SUM OF ALL RESPONSES TO PHQ QUESTIONS 1-9: 0
SUM OF ALL RESPONSES TO PHQ QUESTIONS 1-9: 0
SUM OF ALL RESPONSES TO PHQ9 QUESTIONS 1 & 2: 0
DEPRESSION UNABLE TO ASSESS: PT REFUSES
SUM OF ALL RESPONSES TO PHQ QUESTIONS 1-9: 0
2. FEELING DOWN, DEPRESSED OR HOPELESS: 0
1. LITTLE INTEREST OR PLEASURE IN DOING THINGS: 0
SUM OF ALL RESPONSES TO PHQ QUESTIONS 1-9: 0

## 2022-11-01 ASSESSMENT — LIFESTYLE VARIABLES: HOW OFTEN DO YOU HAVE A DRINK CONTAINING ALCOHOL: NEVER

## 2022-11-01 NOTE — PROGRESS NOTES
Medicare Annual Wellness Visit    Yasmin Marin is here for Medicare AWV    Assessment & Plan   Initial Medicare annual wellness visit  Assessment & Plan:  Care gaps addressed today. Personal history of tobacco use  -     CT Lung Screening; Future  Need for hepatitis C screening test  -     Hepatitis C Antibody; Future  Other problems related to lifestyle  -     Hepatitis C Antibody; Future  Screening for AAA (abdominal aortic aneurysm)  -     US screening for AAA; Future  Hyperlipidemia LDL goal <70  Assessment & Plan:   Well-controlled on Crestor 20 mg daily. Orders:  -     Lipid Panel; Future  Type 2 diabetes mellitus with microalbuminuria, without long-term current use of insulin (HCC)  Assessment & Plan:   Controlled with hemoglobin A1c of 6.7%. Continue Trulicity 3 mg weekly, Metformin ER 1000 mg daily and Farxiga 10 mg daily. Orders:  -     Comprehensive Metabolic Panel; Future  -     Hemoglobin A1C; Future  -     Microalbumin / Creatinine Urine Ratio; Future  Tobacco use  Assessment & Plan:  Check screening CT of lung. Quit smoking in July 2020. Vaccine contraindicated  Assessment & Plan:   Patient had anaphylaxis to flu vaccines. Essential hypertension  Assessment & Plan:  Well-controlled on carvedilol 6.25 mg twice daily and losartan 50 mg daily. Orders:  -     CBC with Auto Differential; Future  Coronary artery disease involving native coronary artery of native heart without angina pectoris  Assessment & Plan:   Patient last saw Dr. Nikolay De La Paz 12/8/2021. He is to follow-up with yearly. Remains on losartan 50 mg daily, carvedilol 6.25 mg twice daily, and Ranexa 500 mg twice daily. Cognitive impairment  Assessment & Plan:  Recently had Inspire placed to treat obstructive sleep apnea as he was not able to tolerate CPAP. If no improvement in membrane with adequate treatment of sleep apnea, will refer to neurology. JEANETTE 23/30 in June 2022.   History of myocardial infarction  Assessment & Plan: Patient last saw Dr. Maris Jones 12/8/2021. He is to follow-up with yearly. Remains on losartan 50 mg daily, carvedilol 6.25 mg twice daily, and Ranexa 500 mg twice daily. Recommendations for Preventive Services Due: see orders and patient instructions/AVS.  Recommended screening schedule for the next 5-10 years is provided to the patient in written form: see Patient Instructions/AVS.     Return in 4 months (on 3/1/2023) for Medicare Annual Wellness Visit in 1 year. Subjective   The following acute and/or chronic problems were also addressed today:    Here today for AWV and follow up. Did see Dr. Manjula Montes. Had surgery to release ulnar nerve of left elbow last week. Had Inspire surgery wit Dr. Karina Hernandez for MARCELLUS. 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 Is still undergoing treatment for this with injections. Memory is \"not good\" per his wife. Has not been able to tolerate CPAP and BiPap. Will not be able to use Inspire for another 5 weeks. DM- no issues with sugars. Waking up every 2 hours to urinate. HTN- no issues. Seeing cardiology yearly. Dr. Maris Jones 12/15/22. Quit smoking in 2020. Needs CT chest (screening). Lab Results   Component Value Date     10/21/2022    K 4.9 10/21/2022    CL 98 10/21/2022    CO2 29 10/21/2022    BUN 19 10/21/2022    CREATININE 1.1 10/21/2022    GLUCOSE 149 (H) 10/21/2022    CALCIUM 10.0 10/21/2022    PROT 7.1 10/21/2022    LABALBU 4.6 10/21/2022    BILITOT 0.4 10/21/2022    ALKPHOS 76 10/21/2022    AST 25 10/21/2022    ALT 39 10/21/2022    LABGLOM 55 (A) 10/10/2022    GFRAA >60 10/10/2022    AGRATIO 1.7 10/29/2020    GLOB 2.5 10/21/2022       Hemoglobin A1C   Date Value Ref Range Status   10/21/2022 6.7 (H) 4.0 - 6.0 % Final     Comment:     (NOTE)    The American Diabetic Association recommends the following  Guidelines:         5.7-6.4% Indicates increased risk for diabetes                  (Prediabetes). >6.5%    Diagnostic of diabetes.  In the absence of                  unequivocal hyperglycemia, results should be                  confirmed by repeat testing. <7%      Glycemic recommendation for nonpregnant adults                   with diabetes. American Diabetes Association, Standards of Medical Care in   Diabetes,Volume 40; 2017         Lab Results   Component Value Date    LABA1C 6.7 (H) 10/21/2022    LABA1C 6.8 (H) 03/15/2022    LABA1C 6.9 04/19/2021     Lab Results   Component Value Date    LABMICR 3660.0 10/21/2022    LDLCALC 60 10/21/2022    CREATININE 1.1 10/21/2022     Lab Results   Component Value Date    CHOL 130 10/21/2022    CHOL 122 03/15/2022    CHOL 160 04/19/2021     Lab Results   Component Value Date    TRIG 148 10/21/2022    TRIG 169 (H) 03/15/2022    TRIG 189 04/19/2021     Lab Results   Component Value Date    HDL 40 10/21/2022    HDL 36 (L) 03/15/2022    HDL 43 04/19/2021     Lab Results   Component Value Date    LDLCALC 60 10/21/2022    LDLCALC 52 03/15/2022    LDLCALC 79 04/19/2021     Lab Results   Component Value Date    LABVLDL 36 03/23/2020    VLDL 30 10/21/2022    VLDL 34 03/15/2022    VLDL 34 01/19/2021     No results found for: MONTSEHDJOSEP           Patient's complete Health Risk Assessment and screening values have been reviewed and are found in Flowsheets. The following problems were reviewed today and where indicated follow up appointments were made and/or referrals ordered.     Positive Risk Factor Screenings with Interventions:      Depression:  Depression Unable to Assess: Pt refuses  PHQ-2 Score: 0  PHQ-9 Total Score: 0    Severity:1-4 = minimal depression, 5-9 = mild depression, 10-14 = moderate depression, 15-19 = moderately severe depression, 20-27 = severe depression        General Health and ACP:  General  In general, how would you say your health is?: Good  In the past 7 days, have you experienced any of the following: New or Increased Pain, New or Increased Fatigue, Loneliness, Social Isolation, Stress or Anger?: No  Do you get the social and emotional support that you need?: Yes  Do you have a Living Will?: (!) No    Advance Directives       Power of  Living Will ACP-Advance Directive ACP-Power of     Not on File Filed on 10/24/22 Filed Not on File        General Health Risk Interventions:  No Living Will: Advance Care Planning addressed with patient today Son Pedro Luis Davenport and wife Jone Chapman would [facility]. Health Habits/Nutrition:  Physical Activity: Insufficiently Active    Days of Exercise per Week: 1 day    Minutes of Exercise per Session: 10 min     Have you lost any weight without trying in the past 3 months?: No     Have you seen the dentist within the past year?: Yes  Health Habits/Nutrition Interventions:  Inadequate physical activity:  patient is not ready to increase his/her physical activity level at this time             Objective   Vitals:    11/01/22 1329   BP: 110/76   Pulse: 76   SpO2: 94%   Weight: 260 lb 9.6 oz (118.2 kg)      Body mass index is 32.57 kg/m².       General Appearance: alert and oriented to person, place and time, well developed and well- nourished, in no acute distress  Skin: warm and dry, no rash or erythema  Head: normocephalic and atraumatic  Eyes: pupils equal, round, and reactive to light, extraocular eye movements intact, conjunctivae normal  ENT: tympanic membrane, external ear and ear canal normal bilaterally, nose without deformity, nasal mucosa and turbinates normal without polyps  Neck: supple and non-tender without mass, no thyromegaly or thyroid nodules, no cervical lymphadenopathy  Pulmonary/Chest: clear to auscultation bilaterally- no wheezes, rales or rhonchi, normal air movement, no respiratory distress  Cardiovascular: normal rate, regular rhythm, normal S1 and S2, no murmurs, rubs, clicks, or gallops, distal pulses intact, no carotid bruits  Abdomen: soft, non-tender, non-distended, normal bowel sounds, no masses or organomegaly  Extremities: no cyanosis, clubbing or edema  Musculoskeletal: normal range of motion, no joint swelling, deformity or tenderness  Neurologic: reflexes normal and symmetric, no cranial nerve deficit, gait, coordination and speech normal       Allergies   Allergen Reactions    Influenza Vaccines Rash    Lisinopril Rash     Prior to Visit Medications    Medication Sig Taking?  Authorizing Provider   carvedilol (COREG) 6.25 MG tablet Take 1 tablet by mouth 2 times daily Yes Carlos Borja DO   metFORMIN (GLUCOPHAGE-XR) 750 MG extended release tablet TAKE 2 TABLETS BY MOUTH DAILY WITH BREAKFAST Yes Carlos Broja DO   TRULICITY 3 DK/2.4BM SOPN INJECT 3MG INTO THE SKIN ONCE A WEEK Yes Carlos Borja DO   FARXIGA 10 MG tablet TAKE 1 TABLET BY MOUTH EVERY MORNING Yes Carlos Borja DO   losartan (COZAAR) 50 MG tablet Take 1 tablet by mouth daily Yes Carlos Borja DO   Accu-Chek FastClix Lancets MISC USE AS DIRECTED TWICE DAILY Yes Carlos Borja DO   blood glucose test strips (ACCU-CHEK GUIDE) strip TEST TWICE DAILY AND AS NEEDED FOR SYMPTOMS OF IRREGULAR BLOOD GLUCOSE Yes Carlos Borja DO   ranolazine (RANEXA) 500 MG extended release tablet TAKE 1 TABLET BY MOUTH TWICE DAILY Yes Carlos Borja DO   omeprazole (PRILOSEC) 20 MG delayed release capsule Take 1 capsule by mouth Daily Yes Carlos Borja DO   blood glucose monitor kit and supplies Check sugars BID and prn Yes Carlos Borja DO   aspirin 81 MG chewable tablet Take 81 mg by mouth daily Yes Historical Provider, MD   glipiZIDE (GLUCOTROL XL) 5 MG extended release tablet TAKE 1 TABLET BY MOUTH EVERY DAY IN THE MORNING  Carlos Borja DO       CareTeam (Including outside providers/suppliers regularly involved in providing care):   Patient Care Team:  Carlos Borja DO as PCP - General (Internal Medicine)  Carlos Borja DO as PCP - REHABILITATION [facility]     Reviewed and updated this visit:  Tobacco Allergies  Meds  Med Hx  Surg Hx  Soc Hx  Fam Hx             Cardiovascular Disease Risk Counseling: Assessed the patient's risk to develop cardiovascular disease and reviewed main risk factors. Reviewed steps to reduce disease risk including:   Quitting tobacco use, reducing amount smoked, or not starting the habit  Making healthy food choices  Being physically active and gradualy increasing activity levels   Reduce weight and determine a healthy BMI goal  Monitor blood pressure and treat if higher than 140/90 mmHg  Maintain blood total cholesterol levels under 5 mmol/l or 190 mg/dl  Maintain LDL cholesterol levels under 3.0 mmol/l or 115 mg/dl   Control blood glucose levels  Consider taking aspirin (75 mg daily), once blood pressure is controlled   Provided a follow up plan. Time spent (minutes): 3 minutes  LDCT Screening: Discussed with patient the benefits and harms of screening, follow-up diagnostic testing, over-diagnosis, false positive rate, and total radiation exposure. Counseled on the importance of adherence to annual lung cancer LDCT screening, impact of comorbidities, ability and willingness to undergo diagnosis and treatment. Counseled on the importance of maintaining cigarette smoking abstinence and cessation. Patient has a history of heavy tobacco use of over 30 pack years. Patient does not present signs or symptoms of lung cancer.

## 2022-11-01 NOTE — PATIENT INSTRUCTIONS
What is lung cancer screening? Lung cancer screening is a way in which doctors check the lungs for early signs of cancer in people who have no symptoms of lung cancer. A low-dose CT scan uses much less radiation than a normal CT scan and shows a more detailed image of the lungs than a standard X-ray. The goal of lung cancer screening is to find cancer early, before it has a chance to grow, spread, or cause problems. One large study found that smokers who were screened with low-dose CT scans were less likely to die of lung cancer than those who were screened with standard X-ray. Below is a summary of the things you need to know regarding screening for lung cancer with low-dose computed tomography (LDCT). This is a screening program that involves routine annual screening with LDCT studies of the lung. The LDCTs are done using low-dose radiation that is not thought to increase your cancer risk. If you have other serious medical conditions (other cancers, congestive heart failure) that limit your life expectancy to less than 10 years, you should not undergo lung cancer screening with LDCT. The chance is 20%-60% that the LDCT result will show abnormalities. This would require additional testing which could include repeat imaging or even invasive procedures. Most (about 95%) of \"abnormal\" LDCT results are false in the sense that no lung cancer is ultimately found. Additionally, some (about 10%) of the cancers found would not affect your life expectancy, even if undetected and untreated. If you are still smoking, the single most important thing that you can do to reduce your risk of dying of lung cancer is to quit. For this screening to be covered by Medicare and most other insurers, strict criteria must be met. If you do not meet these criteria, but still wish to undergo LDCT testing, you will be required to sign a waiver indicating your willingness to pay for the scan.   Personalized Preventive Plan for Carlos Oracio. - 11/1/2022  Medicare offers a range of preventive health benefits. Some of the tests and screenings are paid in full while other may be subject to a deductible, co-insurance, and/or copay. Some of these benefits include a comprehensive review of your medical history including lifestyle, illnesses that may run in your family, and various assessments and screenings as appropriate. After reviewing your medical record and screening and assessments performed today your provider may have ordered immunizations, labs, imaging, and/or referrals for you. A list of these orders (if applicable) as well as your Preventive Care list are included within your After Visit Summary for your review. Other Preventive Recommendations:    A preventive eye exam performed by an eye specialist is recommended every 1-2 years to screen for glaucoma; cataracts, macular degeneration, and other eye disorders. A preventive dental visit is recommended every 6 months. Try to get at least 150 minutes of exercise per week or 10,000 steps per day on a pedometer . Order or download the FREE \"Exercise & Physical Activity: Your Everyday Guide\" from [employer]. Call 4-123.613.2133 or search [employer] online. You need 5615-7674 mg of calcium and 9711-4076 IU of vitamin D per day. It is possible to meet your calcium requirement with diet alone, but a vitamin D supplement is usually necessary to meet this goal.  When exposed to the sun, use a sunscreen that protects against both UVA and UVB radiation with an SPF of 30 or greater. Reapply every 2 to 3 hours or after sweating, drying off with a towel, or swimming. Always wear a seat belt when traveling in a car. Always wear a helmet when riding a bicycle or motorcycle.

## 2022-11-04 ENCOUNTER — OFFICE VISIT (OUTPATIENT)
Dept: ENT CLINIC | Age: 67
End: 2022-11-04

## 2022-11-04 VITALS — RESPIRATION RATE: 16 BRPM | BODY MASS INDEX: 32.33 KG/M2 | WEIGHT: 260 LBS | HEIGHT: 75 IN | TEMPERATURE: 97.1 F

## 2022-11-04 DIAGNOSIS — G47.33 OSA (OBSTRUCTIVE SLEEP APNEA): Primary | ICD-10-CM

## 2022-11-04 PROCEDURE — 99024 POSTOP FOLLOW-UP VISIT: CPT | Performed by: OTOLARYNGOLOGY

## 2022-11-04 NOTE — PROGRESS NOTES
[address], [address], [facility] [place]  P: [phone]       Patient     Nick [place]      ChiefComplaint     Chief Complaint   Patient presents with    Post-Op Check     States that he is doing well, denies any concerns       History of Present Illness     Lesa Sanchez is 1 week status post inspire placement doing well. Reports uncomplicated postop course. Pain minimal.  Eating and drinking without difficulty. Past Medical History     Past Medical History:   Diagnosis Date    Acid reflux     CAD (coronary artery disease)     Hyperlipidemia     Hypertension     MI (myocardial infarction) ([facility] 2014    Obstructive sleep apnea syndrome 2021    Type 2 diabetes mellitus without complication [facility])        Past Surgical History     Past Surgical History:   Procedure Laterality Date    CARPAL TUNNEL RELEASE Left     COLONOSCOPY      FOOT SURGERY Right 2020    fracture    LARYNGOSCOPY N/A 2022    DRUG INDUCED SLEEP ENDOSCOPY performed by Gucci Muse DO at [address]    PTCA  2104    PTCA/Stent x 1 vessel.  following MI    STIMULATOR SURGERY N/A 10/24/2022    INSPIRE DEVICE PLACEMENT performed by Gucci Muse DO at [address] Left        Family History     Family History   Problem Relation Age of Onset    Breast Cancer Mother 52    Heart Attack Father 64    Hypertension Father     Cancer Sister 43    Drug Abuse Sister 62            Heart Attack Maternal Grandfather 52    Heart Attack Son 29       Social History     Social History     Tobacco Use    Smoking status: Former     Packs/day: 1.00     Years: 40.00     Pack years: 40.00     Types: Cigarettes     Start date: 0     Quit date: 2020     Years since quittin.4    Smokeless tobacco: Never   Vaping Use    Vaping Use: Never used   Substance Use Topics    Alcohol use: Not Currently    Drug use: Never        Allergies     Allergies   Allergen Reactions Influenza Vaccines Rash    Lisinopril Rash       Medications     Current Outpatient Medications   Medication Sig Dispense Refill    carvedilol (COREG) 6.25 MG tablet Take 1 tablet by mouth 2 times daily 180 tablet 3    metFORMIN (GLUCOPHAGE-XR) 750 MG extended release tablet TAKE 2 TABLETS BY MOUTH DAILY WITH BREAKFAST 337 tablet 3    TRULICITY 3 JA/1.4UD SOPN INJECT 3MG INTO THE SKIN ONCE A WEEK 12 Adjustable Dose Pre-filled Pen Syringe 3    FARXIGA 10 MG tablet TAKE 1 TABLET BY MOUTH EVERY MORNING 90 tablet 3    losartan (COZAAR) 50 MG tablet Take 1 tablet by mouth daily 90 tablet 1    Accu-Chek FastClix Lancets MISC USE AS DIRECTED TWICE DAILY 102 each 5    blood glucose test strips (ACCU-CHEK GUIDE) strip TEST TWICE DAILY AND AS NEEDED FOR SYMPTOMS OF IRREGULAR BLOOD GLUCOSE 100 strip 5    ranolazine (RANEXA) 500 MG extended release tablet TAKE 1 TABLET BY MOUTH TWICE DAILY 180 tablet 3    omeprazole (PRILOSEC) 20 MG delayed release capsule Take 1 capsule by mouth Daily 90 capsule 3    glipiZIDE (GLUCOTROL XL) 5 MG extended release tablet TAKE 1 TABLET BY MOUTH DAILY 90 tablet 3    blood glucose monitor kit and supplies Check sugars BID and prn 1 kit 0    aspirin 81 MG chewable tablet Take 81 mg by mouth daily       No current facility-administered medications for this visit. PhysicalExam     Vitals:    11/04/22 1013   Resp: 16   Temp: 97.1 °F (36.2 °C)   TempSrc: Infrared   Weight: 260 lb (117.9 kg)   Height: 6' 3\" (1.905 m)     Right anterior cervical incision well healing  Right anterior chest wall incision well healing  Marginal mandibular nerve intact  Hypoglossal nerve intact with full range of motion        Assessment and Plan     1.  MARCELLUS (obstructive sleep apnea)  -1 week status post inspire placement doing well without complications  -Has follow-up with Dr. Todd Herndon scheduled  -Advised to download the inspire pj, create an account and familiarize himself with remote before visit  -Return to see me as aron East DO  11/4/22      Portions of this note were dictated using Dragon.  There may be linguistic errors secondary to the use of this program.

## 2022-11-11 PROBLEM — Z00.00 INITIAL MEDICARE ANNUAL WELLNESS VISIT: Status: ACTIVE | Noted: 2022-11-11

## 2022-11-11 LAB — HEPATITIS C ANTIBODY: NEGATIVE

## 2022-11-11 RX ORDER — GLIPIZIDE 5 MG/1
TABLET, FILM COATED, EXTENDED RELEASE ORAL
Qty: 90 TABLET | Refills: 3 | Status: SHIPPED | OUTPATIENT
Start: 2022-11-11

## 2022-11-11 NOTE — ASSESSMENT & PLAN NOTE
Patient last saw Dr. Luwana Merlin 12/8/2021. He is to follow-up with yearly. Remains on losartan 50 mg daily, carvedilol 6.25 mg twice daily, and Ranexa 500 mg twice daily.

## 2022-11-11 NOTE — TELEPHONE ENCOUNTER
Future Appointments    Encounter Information    Provider Department Appt Notes   3/1/2023 Bridgett Feng, [address] [facility] 4 months     Past Visits    Date Provider Specialty Visit Type Primary Dx   11/01/2022 Bridgett Feng DO Internal Medicine Office Visit Initial Medicare annual wellness visit

## 2022-11-11 NOTE — ASSESSMENT & PLAN NOTE
Recently had Inspire placed to treat obstructive sleep apnea as he was not able to tolerate CPAP. If no improvement in membrane with adequate treatment of sleep apnea, will refer to neurology. JEANETTE 23/30 in June 2022.

## 2022-11-11 NOTE — ASSESSMENT & PLAN NOTE
Patient last saw Dr. Schmidt Spar 12/8/2021. He is to follow-up with yearly. Remains on losartan 50 mg daily, carvedilol 6.25 mg twice daily, and Ranexa 500 mg twice daily.

## 2022-11-11 NOTE — ASSESSMENT & PLAN NOTE
Controlled with hemoglobin A1c of 6.7%. Continue Trulicity 3 mg weekly, Metformin ER 1000 mg daily and Farxiga 10 mg daily.

## 2022-12-02 RX ORDER — OMEPRAZOLE 20 MG/1
20 CAPSULE, DELAYED RELEASE ORAL DAILY
Qty: 90 CAPSULE | Refills: 3 | Status: SHIPPED | OUTPATIENT
Start: 2022-12-02

## 2022-12-07 RX ORDER — LOSARTAN POTASSIUM 50 MG/1
50 TABLET ORAL DAILY
Qty: 90 TABLET | Refills: 1 | Status: SHIPPED | OUTPATIENT
Start: 2022-12-07

## 2022-12-08 ENCOUNTER — PROCEDURE VISIT (OUTPATIENT)
Dept: PULMONOLOGY | Age: 67
End: 2022-12-08

## 2022-12-08 DIAGNOSIS — G47.10 HYPERSOMNIA: ICD-10-CM

## 2022-12-08 DIAGNOSIS — R80.9 TYPE 2 DIABETES MELLITUS WITH MICROALBUMINURIA, WITHOUT LONG-TERM CURRENT USE OF INSULIN (HCC): ICD-10-CM

## 2022-12-08 DIAGNOSIS — E11.29 TYPE 2 DIABETES MELLITUS WITH MICROALBUMINURIA, WITHOUT LONG-TERM CURRENT USE OF INSULIN (HCC): ICD-10-CM

## 2022-12-08 DIAGNOSIS — G47.33 OBSTRUCTIVE SLEEP APNEA SYNDROME: Primary | ICD-10-CM

## 2022-12-08 ASSESSMENT — ENCOUNTER SYMPTOMS
RESPIRATORY NEGATIVE: 1
EYES NEGATIVE: 1
GASTROINTESTINAL NEGATIVE: 1
ALLERGIC/IMMUNOLOGIC NEGATIVE: 1

## 2022-12-08 NOTE — PATIENT INSTRUCTIONS
ASSESSMENT/PLAN:   Diagnosis Orders   1. Obstructive sleep apnea syndrome        2. Type 2 diabetes mellitus with microalbuminuria, without long-term current use of insulin ([facility])        3. Hypersomnia            Sleep study to qualify for inspire    BMI at the time of of implantation was 31      Drug-induced sleep endoscopy  Date of Procedure: 09/13/22  Time: 0715     Pre Operative Diagnoses: Obstructive Sleep Apnea  Post Operative Diagnoses:  Obstructive Sleep Apnea           Procedure:  1. Drug Induced Sleep endoscopy (70459)       Surgeon: Erna Pang DO  A mild lateral wall component was noted, but the palatal collapse was primarily an anterior posterior fashion. More distally, mild lateral oropharyngeal wall component was noted, but again no complete lateral oropharyngeal collapse. 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 In summary, there is no evidence of complete concentric palatal obstruction and does appear to be a candidate anatomically for hypoglossal nerve stimulation therapy. Inspire implantation  Date of Operation:   10/24/22       Post-operative Diagnosis:   Obstructive sleep apnea     Operative Procedure:   Procedure(s):  INSPIRE DEVICE PLACEMENT (N/A)      Attending Surgeon:   Erna Pang DO      Patient has come in for sleep activation of inspire on 12/8/2022  Neurostimulator Reprogramming  Approximately 20 minutes was spent analyzing the airway and programming the device. Sensing voltage:  0.5 volts  Starting amplitude: 1.0 volts with the default [+ - +] electrode configuration.   Starting Range:  Lower Limit: 1.0 volts  Upper Limit: 2.0 volts            Rate: Freq 33 hz  And 90 pulse width  Amplitude 1.0    Start delay 30minutes  Pause delay 15minutes  Duration 8hours      Continue to increase the level up by 1 level point every 7 days, adjust to comfort, if it feels uncomfortable drop it back by 1 level. RTC in 6 weeks.

## 2022-12-08 NOTE — LETTER
[facility] Critical [facility] Sleep  [address] [place] [address]  Phone: [phone]  Fax: [phone]    Yogesh Lawler MD        December 8, 2022     Patient: Karen Cunningham. YOB: 1955   Date of Visit: 12/8/2022 12/8/22        Karen Corner. I have seen this patient in the office today and wanted to communicate my findings and recommendations. Patient Instructions       ASSESSMENT/PLAN:   Diagnosis Orders   1. Obstructive sleep apnea syndrome        2. Type 2 diabetes mellitus with microalbuminuria, without long-term current use of insulin (HCC)        3. Hypersomnia            Sleep study to qualify for inspire    BMI at the time of of implantation was 31      Drug-induced sleep endoscopy  Date of Procedure: 09/13/22  Time: 0715     Pre Operative Diagnoses: Obstructive Sleep Apnea  Post Operative Diagnoses:  Obstructive Sleep Apnea           Procedure:  1. Drug Induced Sleep endoscopy (64203)       Surgeon: Kristan Davis DO  A mild lateral wall component was noted, but the palatal collapse was primarily an anterior posterior fashion. More distally, mild lateral oropharyngeal wall component was noted, but again no complete lateral oropharyngeal collapse. 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 In summary, there is no evidence of complete concentric palatal obstruction and does appear to be a candidate anatomically for hypoglossal nerve stimulation therapy.               Inspire implantation  Date of Operation:   10/24/22       Post-operative Diagnosis:   Obstructive sleep apnea     Operative Procedure:   Procedure(s):  INSPIRE DEVICE PLACEMENT (N/A)      Attending Surgeon:   Kristan Davis DO      Patient has come in for sleep activation of inspire on 12/8/2022  Neurostimulator Reprogramming  Approximately 20 minutes was spent analyzing the airway and programming the device. Sensing voltage:  0.5 volts  Starting amplitude: 1.0 volts with the default [+ - +] electrode configuration. Starting Range:  · Lower Limit: 1.0 volts  · Upper Limit: 2.0 volts            Rate: Freq 33 hz  And 90 pulse width  Amplitude 1.0    Start delay 30minutes  Pause delay 15minutes  Duration 8hours      Continue to increase the level up by 1 level point every 7 days, adjust to comfort, if it feels uncomfortable drop it back by 1 level. RTC in 6 weeks. Thank you for allowing me to assist in the care of the Sandie Ken.                    MD Gabriel Gregg MD

## 2022-12-08 NOTE — PROGRESS NOTES
Alison Anderson. (: 1955 ) is a 75 y.o. male here for an evaluation of No chief complaint on file. ASSESSMENT/PLAN:   Diagnosis Orders   1. Obstructive sleep apnea syndrome        2. Type 2 diabetes mellitus with microalbuminuria, without long-term current use of insulin ([facility])        3. Hypersomnia            Sleep study to qualify for inspire    BMI at the time of of implantation was 31      Drug-induced sleep endoscopy  Date of Procedure: 22  Time: 0715     Pre Operative Diagnoses: Obstructive Sleep Apnea  Post Operative Diagnoses:  Obstructive Sleep Apnea           Procedure:  1. Drug Induced Sleep endoscopy (94337)       Surgeon: Jaiden Melvin DO  A mild lateral wall component was noted, but the palatal collapse was primarily an anterior posterior fashion. More distally, mild lateral oropharyngeal wall component was noted, but again no complete lateral oropharyngeal collapse. 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 In summary, there is no evidence of complete concentric palatal obstruction and does appear to be a candidate anatomically for hypoglossal nerve stimulation therapy. Inspire implantation  Date of Operation:   10/24/22       Post-operative Diagnosis:   Obstructive sleep apnea     Operative Procedure:   Procedure(s):  INSPIRE DEVICE PLACEMENT (N/A)      Attending Surgeon:   Jaiden Melvin DO      Patient has come in for sleep activation of inspire on 2022  Neurostimulator Reprogramming  Approximately 20 minutes was spent analyzing the airway and programming the device. Sensing voltage:  0.5 volts  Starting amplitude: 1.0 volts with the default [+ - +] electrode configuration.   Starting Range:  Lower Limit: 1.0 volts  Upper Limit: 2.0 volts            Rate: Freq 33 hz  And 90 pulse width  Amplitude 1.0    Start delay 30minutes  Pause delay 15minutes  Duration 8hours      Continue to increase the level up by 1 level point every 7 days, adjust to comfort, if it feels uncomfortable drop it back by 1 level. RTC in 6 weeks. No follow-ups on file. SUBJECTIVE/OBJECTIVE:    Consult from Jenny Cruz CNP  Initially talked with me in July 2022  Patient has a diagnosis of moderate sleep apnea  Was not able to tolerate CPAP machine or mask  Tried various pressures and mask and could not tolerate  Was looking for an alternative for CPAP therapy        Since implantation of inspire  No issues  Tolerating well  No chest pains  No jaw pain          Review of Systems   Constitutional: Negative. HENT: Negative. Eyes: Negative. Respiratory: Negative. Cardiovascular: Negative. Gastrointestinal: Negative. Endocrine: Negative. Genitourinary: Negative. Musculoskeletal: Negative. Skin: Negative. Allergic/Immunologic: Negative. Neurological: Negative. Hematological: Negative. Psychiatric/Behavioral: Negative. There were no vitals filed for this visit. Physical Exam  Vitals and nursing note reviewed. Constitutional:       General: He is not in acute distress. Appearance: Normal appearance. He is not ill-appearing. HENT:      Head: Normocephalic and atraumatic. Right Ear: External ear normal.      Left Ear: External ear normal.      Nose: Nose normal.      Mouth/Throat:      Mouth: Mucous membranes are moist.      Pharynx: Oropharynx is clear. Comments: 8Mallampati 3  Incision for inspire on the jaw appears to be clean and well-healed  Eyes:      General: No scleral icterus. Extraocular Movements: Extraocular movements intact. Conjunctiva/sclera: Conjunctivae normal.      Pupils: Pupils are equal, round, and reactive to light. Cardiovascular:      Rate and Rhythm: Normal rate and regular rhythm. Pulses: Normal pulses. Heart sounds: Normal heart sounds. No murmur heard. No friction rub. Pulmonary:      Effort: No respiratory distress. Breath sounds: No wheezing or rales. Comments: Incision for inspire implantation on the right chest wall appears to be clean and well-healed  Abdominal:      General: Abdomen is flat. Bowel sounds are normal. There is no distension. Tenderness: There is no abdominal tenderness. There is no guarding. Musculoskeletal:         General: No swelling or tenderness. Normal range of motion. Cervical back: Normal range of motion and neck supple. No rigidity. Skin:     General: Skin is warm and dry. Coloration: Skin is not jaundiced. Neurological:      General: No focal deficit present. Mental Status: He is alert and oriented to person, place, and time. Mental status is at baseline. Cranial Nerves: No cranial nerve deficit. Sensory: No sensory deficit. Motor: No weakness. Gait: Gait normal.   Psychiatric:         Mood and Affect: Mood normal.         Thought Content:  Thought content normal.         Judgment: Judgment normal.            Alfredito Arana MD

## 2022-12-08 NOTE — PROGRESS NOTES
MA Communication:   The following orders are received by verbal communication from Anya Munson MD    Orders include:  6 wk fu 1/19/23       2 wk fu call 12/22/22

## 2022-12-11 PROBLEM — Z00.00 INITIAL MEDICARE ANNUAL WELLNESS VISIT: Status: RESOLVED | Noted: 2022-11-11 | Resolved: 2022-12-11

## 2022-12-14 ENCOUNTER — HOSPITAL ENCOUNTER (OUTPATIENT)
Dept: CT IMAGING | Age: 67
Discharge: HOME | End: 2022-12-14
Payer: MEDICARE

## 2022-12-14 ENCOUNTER — APPOINTMENT (OUTPATIENT)
Dept: ULTRASOUND IMAGING | Age: 67
End: 2022-12-14
Payer: MEDICARE

## 2022-12-14 DIAGNOSIS — Z87.891 PERSONAL HISTORY OF TOBACCO USE: ICD-10-CM

## 2022-12-14 PROCEDURE — 71271 CT THORAX LUNG CANCER SCR C-: CPT

## 2022-12-15 DIAGNOSIS — Z11.59 NEED FOR HEPATITIS C SCREENING TEST: ICD-10-CM

## 2022-12-15 DIAGNOSIS — Z72.89 OTHER PROBLEMS RELATED TO LIFESTYLE: ICD-10-CM

## 2022-12-17 ENCOUNTER — HOSPITAL ENCOUNTER (OUTPATIENT)
Dept: ULTRASOUND IMAGING | Age: 67
Discharge: HOME | End: 2022-12-17
Payer: MEDICARE

## 2022-12-17 DIAGNOSIS — Z13.6 SCREENING FOR AAA (ABDOMINAL AORTIC ANEURYSM): ICD-10-CM

## 2022-12-17 PROCEDURE — 76706 US ABDL AORTA SCREEN AAA: CPT

## 2022-12-19 ENCOUNTER — TELEPHONE (OUTPATIENT)
Dept: CASE MANAGEMENT | Age: 67
End: 2022-12-19

## 2022-12-19 DIAGNOSIS — K76.0 FATTY LIVER: Primary | ICD-10-CM

## 2022-12-19 NOTE — TELEPHONE ENCOUNTER
Baseline lung screen on 12/14/22. LRAD2. Recommended screen in one year. Results letter mailed.  Will follow in the lung screening program.

## 2022-12-22 ENCOUNTER — TELEPHONE (OUTPATIENT)
Dept: PULMONOLOGY | Age: 67
End: 2022-12-22

## 2022-12-22 NOTE — TELEPHONE ENCOUNTER
Are you using Inspire every night? Yes     2. How many steps have you stepped up? Goes up to 2nd level today    3. Are you more rested during the day? Yes     4. Follow up appointment with Dr. Flako Fuller is scheduled for:   1/19/23    Overall, pt is doing well.

## 2022-12-28 ENCOUNTER — TELEMEDICINE (OUTPATIENT)
Dept: INTERNAL MEDICINE CLINIC | Age: 67
End: 2022-12-28
Payer: MEDICARE

## 2022-12-28 ENCOUNTER — TELEPHONE (OUTPATIENT)
Dept: INTERNAL MEDICINE CLINIC | Age: 67
End: 2022-12-28

## 2022-12-28 DIAGNOSIS — R11.0 NAUSEA: ICD-10-CM

## 2022-12-28 DIAGNOSIS — E11.29 TYPE 2 DIABETES MELLITUS WITH MICROALBUMINURIA, WITHOUT LONG-TERM CURRENT USE OF INSULIN (HCC): ICD-10-CM

## 2022-12-28 DIAGNOSIS — U07.1 COVID: Primary | ICD-10-CM

## 2022-12-28 DIAGNOSIS — R80.9 TYPE 2 DIABETES MELLITUS WITH MICROALBUMINURIA, WITHOUT LONG-TERM CURRENT USE OF INSULIN (HCC): ICD-10-CM

## 2022-12-28 DIAGNOSIS — I25.10 CORONARY ARTERY DISEASE INVOLVING NATIVE CORONARY ARTERY OF NATIVE HEART WITHOUT ANGINA PECTORIS: Chronic | ICD-10-CM

## 2022-12-28 PROCEDURE — 99442 PR PHYS/QHP TELEPHONE EVALUATION 11-20 MIN: CPT | Performed by: FAMILY MEDICINE

## 2022-12-28 RX ORDER — ONDANSETRON 4 MG/1
4 TABLET, FILM COATED ORAL EVERY 8 HOURS PRN
Qty: 15 TABLET | Refills: 1 | Status: SHIPPED | OUTPATIENT
Start: 2022-12-28

## 2022-12-28 NOTE — TELEPHONE ENCOUNTER
----- Message from Ab Rey sent at 12/28/2022  8:30 AM EST -----  Subject: Message to Provider    QUESTIONS  Information for Provider? Patient tested positive for Covid on 12/28/22   symptom are Cough Chills Nausea Diarrhea Vomiting headache please call and   advise  ---------------------------------------------------------------------------  --------------  Yandel TO  [phone]; OK to leave message on voicemail  ---------------------------------------------------------------------------  --------------  SCRIPT ANSWERS  Relationship to Patient?  Self

## 2022-12-28 NOTE — PROGRESS NOTES
Tosha Contreras. is a 75 y.o. male evaluated via audio-only technology on 12/28/2022 for Positive For Covid-19 (Tested positive 12/27/Symptoms started Sunday-headache, body ache, cough, vomiting, diarrhea, chills)      Assessment & Plan:   1. COVID  Discussed need to start treatment asap but no later than tomorrow. - nirmatrelvir/ritonavir (PAXLOVID, 300/100,) 20 x 150 MG & 10 x 100MG TBPK; Take 3 tablets (two 150 mg nirmatrelvir and one 100 mg ritonavir tablets) by mouth every 12 hours for 5 days. Dispense: 30 tablet; Refill: 0    2. Type 2 diabetes mellitus with microalbuminuria, without long-term current use of insulin (HCC)  Under good control but is a high risk condition. 3. Coronary artery disease involving native coronary artery of native heart without angina pectoris  Will need to hold Ranexa while on Paxlovid due to interactions. He is not having any active angina     4. Nausea  Prn use. - ondansetron (ZOFRAN) 4 MG tablet; Take 1 tablet by mouth every 8 hours as needed for Nausea or Vomiting  Dispense: 15 tablet; Refill: 1       See AVS for there symptom relief treatment. Call or return to clinic prn if these symptoms worsen or fail to improve as anticipated. Subjective:   Tested positive for COVID 12/27/22;  first symptom 12/25/22   Body aches, coughing, vomiting, diarrhea, chills. Vomiting did stop. Has a lot of headache and body aches. High risk conditions are DM, age, non morbid obesity, CAD, MARCELLUS    Hemoglobin A1C   Date Value Ref Range Status   10/21/2022 6.7 (H) 4.0 - 6.0 % Final     Comment:         Prior to Admission medications    Medication Sig Start Date End Date Taking?  Authorizing Provider   losartan (COZAAR) 50 MG tablet TAKE 1 TABLET BY MOUTH DAILY 12/7/22  Yes Nahum Freeman,    omeprazole (PRILOSEC) 20 MG delayed release capsule TAKE 1 CAPSULE BY MOUTH DAILY 12/2/22  Yes Nahum Freeman,    glipiZIDE (GLUCOTROL XL) 5 MG extended release tablet TAKE 1 TABLET BY MOUTH EVERY DAY IN THE MORNING 11/11/22  Yes Cindy Orozco, DO   carvedilol (COREG) 6.25 MG tablet Take 1 tablet by mouth 2 times daily 11/1/22  Yes Cindy Orozco, DO   metFORMIN (GLUCOPHAGE-XR) 750 MG extended release tablet TAKE 2 TABLETS BY MOUTH DAILY WITH BREAKFAST 9/9/22  Yes Cindy Orozco, DO   TRULICITY 3 XD/4.1DD SOPN INJECT 3MG INTO THE SKIN ONCE A WEEK 9/7/22  Yes Cindy Orozco, DO   FARXIGA 10 MG tablet TAKE 1 TABLET BY MOUTH EVERY MORNING 8/15/22  Yes Cindy Orozco, DO   ranolazine (RANEXA) 500 MG extended release tablet TAKE 1 TABLET BY MOUTH TWICE DAILY 4/11/22  Yes Cindy Orozco, DO   aspirin 81 MG chewable tablet Take 81 mg by mouth daily 4/11/13  Yes Historical Provider, MD   Accu-Chek FastClix Lancets MISC USE AS DIRECTED TWICE DAILY 5/2/22   Cindy Orozco, DO   blood glucose test strips (ACCU-CHEK GUIDE) strip TEST TWICE DAILY AND AS NEEDED FOR SYMPTOMS OF IRREGULAR BLOOD GLUCOSE 5/2/22   Cindy Orozco, DO   blood glucose monitor kit and supplies Check sugars BID and prn 6/22/21   Cindy Orozco, DO       Review of Systems    No data recorded    Faith Claros. was evaluated through a patient-initiated, synchronous (real-time) audio only encounter. He (or guardian if applicable) is aware that it is a billable service, which includes applicable co-pays, with coverage as determined by his insurance carrier. This visit was conducted with the patient's (and/or Torie Orta guardian's) verbal consent. He has not had a related appointment within my department in the past 7 days or scheduled within the next 24 hours. The patient was located in a state where the provider was licensed to provide care. The patient was located at: Home: [address] [address] [address] [address]  The provider was located at:  Facility (Appt Dept): [facility],  [address]    Total Time: minutes: 11-20 minutes  15 min    Cherelle Benitez MD

## 2022-12-28 NOTE — PATIENT INSTRUCTIONS
RECOMMENDATIONS FOR COVID SYMPTOM RELIEF. We suggest treating Covid19 virus similarly to influenza and other upper respiratory viruses. Antibiotics do not kill or treat viruses. We encourage you to purchase a a pulse oximeter if you do not already have one. (over the counter at pharmacies or [employer] for about $15-$30):  monitor 3 or more times per day. If oxygen sat is 91% or lower, go to the emergency room. Even if oxygen levels are OK, If you cannot catch your breath, go to the emergency room. For cough, consider Coricidin HBP, Mucinex DM or Delsym. We can consider a prescription cough gelcap if these do not help. If you wheeze, then let office know and I can prescribe an asthma inhaler. If nasal congestion, AFRIN NO DRIP NASAL SPRAY (OXYMETAZOLONE DECONGESTANT SPRAY)  can be used twice daily but only for a MAXIMUM OF 7 DAYS. Longer use can cause rebound congestion. If you have been off for a full 72 hours (3 days) and get stuffy again, you can re-use for 3 more days. You can consider Zyrtec D or Sudafed decongestant instead or in addition if really stuffy or you have active allergies. If fever, aches and pains:  Tylenol 1000 mg every 6 hours as needed. If Ibuprofen is allowed based on your underlying health conditions (not wise for diabetics, chronic kidney disease, ulcer patients, elderly), then you can also take 400 mg every 6 hours as needed with or without the Tylenol. If sore throat:  gargle with salt water, drink plenty of water and avoid acid-y drinks like citrus and coffee, use a sore throat lozenge like Cepacol or a honey and lemon cough drop. Take Tylenol or Ibuprofen if helps with the pain. Cold liquids and foods are better tolerated when sore. There is a tea called Throat Coat tea that could be helpful.     If Diarrhea:  Imodium as needed and try to eat a low fat, low fiber diet    If Stomach or Upper Abdominal distress, you can take Pepcid 20 mg twice a day,  If nausea unrelieved with Pepcid, contact office and I can send in prescription nausea medication. There is no treatment if you have a change in smell or taste and these symptoms can linger for a long time, even when you are well and no longer under quarantine. Try to keep your legs moving even if you are lying in bed to prevent blood clots. You can look up COVID isolation suggestions at the [facility] web site:  www.cdc.gov;  quarantine for 5 full days and if going out wear a good quality mask such as N95 or surgical mask for another 5 days. If you have a negative COVID test sooner than 10 days, you can stop masking then.

## 2023-01-09 ENCOUNTER — HOSPITAL ENCOUNTER (OUTPATIENT)
Dept: ULTRASOUND IMAGING | Age: 68
Discharge: HOME | End: 2023-01-09
Payer: MEDICARE

## 2023-01-09 DIAGNOSIS — K76.0 FATTY LIVER: ICD-10-CM

## 2023-01-09 PROCEDURE — 76705 ECHO EXAM OF ABDOMEN: CPT

## 2023-01-19 ENCOUNTER — OFFICE VISIT (OUTPATIENT)
Dept: PULMONOLOGY | Age: 68
End: 2023-01-19
Payer: MEDICARE

## 2023-01-19 DIAGNOSIS — G47.33 OBSTRUCTIVE SLEEP APNEA SYNDROME: Primary | ICD-10-CM

## 2023-01-19 DIAGNOSIS — G47.10 HYPERSOMNIA: ICD-10-CM

## 2023-01-19 PROCEDURE — 95976 ALYS SMPL CN NPGT PRGRMG: CPT | Performed by: INTERNAL MEDICINE

## 2023-01-19 PROCEDURE — 1123F ACP DISCUSS/DSCN MKR DOCD: CPT | Performed by: INTERNAL MEDICINE

## 2023-01-19 PROCEDURE — 1036F TOBACCO NON-USER: CPT | Performed by: INTERNAL MEDICINE

## 2023-01-19 PROCEDURE — G8484 FLU IMMUNIZE NO ADMIN: HCPCS | Performed by: INTERNAL MEDICINE

## 2023-01-19 PROCEDURE — 3017F COLORECTAL CA SCREEN DOC REV: CPT | Performed by: INTERNAL MEDICINE

## 2023-01-19 PROCEDURE — 99215 OFFICE O/P EST HI 40 MIN: CPT | Performed by: INTERNAL MEDICINE

## 2023-01-19 PROCEDURE — G8428 CUR MEDS NOT DOCUMENT: HCPCS | Performed by: INTERNAL MEDICINE

## 2023-01-19 PROCEDURE — G8417 CALC BMI ABV UP PARAM F/U: HCPCS | Performed by: INTERNAL MEDICINE

## 2023-01-19 ASSESSMENT — ENCOUNTER SYMPTOMS
RESPIRATORY NEGATIVE: 1
GASTROINTESTINAL NEGATIVE: 1
EYES NEGATIVE: 1
ALLERGIC/IMMUNOLOGIC NEGATIVE: 1

## 2023-01-19 NOTE — PROGRESS NOTES
Chaz Carter. (: 1955 ) is a 75 y.o. male here for an evaluation of No chief complaint on file. ASSESSMENT/PLAN:   Diagnosis Orders   1. Obstructive sleep apnea syndrome        2. Hypersomnia            Sleep study to qualify for inspire    BMI at the time of of implantation was 31      Drug-induced sleep endoscopy  Date of Procedure: 22  Time: 0715     Pre Operative Diagnoses: Obstructive Sleep Apnea  Post Operative Diagnoses:  Obstructive Sleep Apnea           Procedure:  1. Drug Induced Sleep endoscopy (15411)       Surgeon: Andrey Wilhelm DO  A mild lateral wall component was noted, but the palatal collapse was primarily an anterior posterior fashion. More distally, mild lateral oropharyngeal wall component was noted, but again no complete lateral oropharyngeal collapse. 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 In summary, there is no evidence of complete concentric palatal obstruction and does appear to be a candidate anatomically for hypoglossal nerve stimulation therapy. Inspire implantation  Date of Operation:   10/24/22       Post-operative Diagnosis:   Obstructive sleep apnea     Operative Procedure:   Procedure(s):  INSPIRE DEVICE PLACEMENT (N/A)      Attending Surgeon:   Andrey Wilhelm DO      Patient has come in for sleep activation of inspire on 2022  Neurostimulator Reprogramming  Approximately 20 minutes was spent analyzing the airway and programming the device. Sensing voltage:  0.5 volts  Starting amplitude: 1.0 volts with the default [+ - +] electrode configuration.   Starting Range:  Lower Limit: 1.0 volts  Upper Limit: 2.0 volts            Rate: Freq 33 hz  And 90 pulse width  Amplitude 1.0    Start delay 30minutes  Pause delay 15minutes  Duration 8hours      Continue to increase the level up by 1 level point every 7 days, adjust to comfort, if it feels uncomfortable drop it back by 1 level.      Inspire follow-up on 1/19/2023  Neurostimulator Reprogramming  Approximately 20 minutes was spent analyzing the airway and programming the device.  Incoming Amplitude: 1.6 volts with the default [+ - +] electrode configuration.  Incoming Control Range:  Lower Limit: 1.0 volts  Upper Limit: 2.0 volts         Rate: Freq 33 hz  And 90 pulse width  Amplitude 1.6    Start delay  30 minutes  Pause delay 15 minutes  Duration 8  hours      Time usage: 70 Hours per week      Outgoing Control Range:  Lower Limit: 1.0 volts  Upper Limit: 2.0 volts    Doing well with the current setting of level 6  We will continue to titrate slowly up  Will do MARCELLUS inspire titration      Increase use of inspire, keep adjusting the level up to 1 level every  7 days        RTC in after sleep titration of inspire    No follow-ups on file.       SUBJECTIVE/OBJECTIVE:    Consult from Marla Lin CNP  Initially talked with me in July 2022  Patient has a diagnosis of moderate sleep apnea  Was not able to tolerate CPAP machine or mask  Tried various pressures and mask and could not tolerate  Was looking for an alternative for CPAP therapy        Since implantation of inspire  No issues  Tolerating well  No chest pains  No jaw pain          Review of Systems   Constitutional: Negative.    HENT: Negative.     Eyes: Negative.    Respiratory: Negative.     Cardiovascular: Negative.    Gastrointestinal: Negative.    Endocrine: Negative.    Genitourinary: Negative.    Musculoskeletal: Negative.    Skin: Negative.    Allergic/Immunologic: Negative.    Neurological: Negative.    Hematological: Negative.    Psychiatric/Behavioral: Negative.         There were no vitals filed for this visit.     Physical Exam  Vitals and nursing note reviewed.   Constitutional:       General: He is not in acute distress.     Appearance: Normal appearance. He is not ill-appearing.   HENT:      Head: Normocephalic and atraumatic.       Right Ear: External ear normal.      Left Ear: External ear normal.      Nose: Nose normal.      Mouth/Throat:      Mouth: Mucous membranes are moist.      Pharynx: Oropharynx is clear.      Comments: 8Mallampati 3  Incision for inspire on the jaw appears to be clean and well-healed  Eyes:      General: No scleral icterus.     Extraocular Movements: Extraocular movements intact.      Conjunctiva/sclera: Conjunctivae normal.      Pupils: Pupils are equal, round, and reactive to light.   Cardiovascular:      Rate and Rhythm: Normal rate and regular rhythm.      Pulses: Normal pulses.      Heart sounds: Normal heart sounds. No murmur heard.    No friction rub.   Pulmonary:      Effort: No respiratory distress.      Breath sounds: No wheezing or rales.      Comments: Incision for inspire implantation on the right chest wall appears to be clean and well-healed  Abdominal:      General: Abdomen is flat. Bowel sounds are normal. There is no distension.      Tenderness: There is no abdominal tenderness. There is no guarding.   Musculoskeletal:         General: No swelling or tenderness. Normal range of motion.      Cervical back: Normal range of motion and neck supple. No rigidity.   Skin:     General: Skin is warm and dry.      Coloration: Skin is not jaundiced.   Neurological:      General: No focal deficit present.      Mental Status: He is alert and oriented to person, place, and time. Mental status is at baseline.      Cranial Nerves: No cranial nerve deficit.      Sensory: No sensory deficit.      Motor: No weakness.      Gait: Gait normal.   Psychiatric:         Mood and Affect: Mood normal.         Thought Content: Thought content normal.         Judgment: Judgment normal.            LEANNA MEJIA MD

## 2023-01-19 NOTE — PROGRESS NOTES
MA Communication:   The following orders are received by verbal communication from Ellen Singer MD    Orders include:  INSPIRE PSG (sleep center to call)       FU afterwards

## 2023-01-19 NOTE — PATIENT INSTRUCTIONS
ASSESSMENT/PLAN:   Diagnosis Orders   1. Obstructive sleep apnea syndrome        2. Hypersomnia            Sleep study to qualify for inspire    BMI at the time of of implantation was 31      Drug-induced sleep endoscopy  Date of Procedure: 09/13/22  Time: 0715     Pre Operative Diagnoses: Obstructive Sleep Apnea  Post Operative Diagnoses:  Obstructive Sleep Apnea           Procedure:  1. Drug Induced Sleep endoscopy (91895)       Surgeon: Mat Odom DO  A mild lateral wall component was noted, but the palatal collapse was primarily an anterior posterior fashion. More distally, mild lateral oropharyngeal wall component was noted, but again no complete lateral oropharyngeal collapse. 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 In summary, there is no evidence of complete concentric palatal obstruction and does appear to be a candidate anatomically for hypoglossal nerve stimulation therapy. Inspire implantation  Date of Operation:   10/24/22       Post-operative Diagnosis:   Obstructive sleep apnea     Operative Procedure:   Procedure(s):  INSPIRE DEVICE PLACEMENT (N/A)      Attending Surgeon:   Mat Odom DO      Patient has come in for sleep activation of inspire on 12/8/2022  Neurostimulator Reprogramming  Approximately 20 minutes was spent analyzing the airway and programming the device. Sensing voltage:  0.5 volts  Starting amplitude: 1.0 volts with the default [+ - +] electrode configuration. Starting Range:  Lower Limit: 1.0 volts  Upper Limit: 2.0 volts            Rate: Freq 33 hz  And 90 pulse width  Amplitude 1.0    Start delay 30minutes  Pause delay 15minutes  Duration 8hours      Continue to increase the level up by 1 level point every 7 days, adjust to comfort, if it feels uncomfortable drop it back by 1 level.       Inspire follow-up on 1/19/2023  Neurostimulator Reprogramming  Approximately 20 minutes was spent analyzing the airway and programming the device. Incoming Amplitude: 1.6 volts with the default [+ - +] electrode configuration.   Incoming Control Range:  Lower Limit: 1.0 volts  Upper Limit: 2.0 volts         Rate: Freq 33 hz  And 90 pulse width  Amplitude 1.6    Start delay  30 minutes  Pause delay 15 minutes  Duration 8  hours      Time usage: 70 Hours per week      Outgoing Control Range:  Lower Limit: 1.0 volts  Upper Limit: 2.0 volts    Doing well with the current setting of level 6  We will continue to titrate slowly up  Will do MARCELLUS inspire titration      Increase use of inspire, keep adjusting the level up to 1 level every  7 days        RTC in after sleep titration of inspire

## 2023-01-20 ENCOUNTER — TELEPHONE (OUTPATIENT)
Dept: SLEEP CENTER | Age: 68
End: 2023-01-20

## 2023-01-20 NOTE — TELEPHONE ENCOUNTER
Called to schedule a psg per Theresa Cunha pt     Scheduling at [facility] in March - left vm with pt stating this and asked for a phone call back.

## 2023-01-26 ENCOUNTER — TELEPHONE (OUTPATIENT)
Dept: PULMONOLOGY | Age: 68
End: 2023-01-26

## 2023-01-26 NOTE — TELEPHONE ENCOUNTER
----- Message from Chiara Rios MA sent at 1/19/2023  2:35 PM EST -----  Needs INSPIRE PSG. Will schedule follow up once testing is scheduled.

## 2023-01-26 NOTE — TELEPHONE ENCOUNTER
PSG is scheduled for 3/5/23. I attempted to call patient to schedule follow up and I was not able to leave a message. Will try again later.

## 2023-02-15 ENCOUNTER — TELEPHONE (OUTPATIENT)
Dept: INTERNAL MEDICINE CLINIC | Age: 68
End: 2023-02-15

## 2023-02-15 NOTE — TELEPHONE ENCOUNTER
Patient's wife calling in,     He is on 3mg of trulicity injection, very hard to find. Going to be missing some doses pharmacy cannot get them in. Patient's wife is wanting to know if Dr. Jaja Lopez can up his dose to 4.5 mg, pharmacist told them that those are in stock and easier to find.

## 2023-02-17 RX ORDER — DULAGLUTIDE 4.5 MG/.5ML
4.5 INJECTION, SOLUTION SUBCUTANEOUS WEEKLY
Qty: 4 ADJUSTABLE DOSE PRE-FILLED PEN SYRINGE | Refills: 3 | Status: SHIPPED | OUTPATIENT
Start: 2023-02-17

## 2023-03-01 ENCOUNTER — OFFICE VISIT (OUTPATIENT)
Dept: INTERNAL MEDICINE CLINIC | Age: 68
End: 2023-03-01
Payer: MEDICARE

## 2023-03-01 VITALS
SYSTOLIC BLOOD PRESSURE: 110 MMHG | HEIGHT: 75 IN | HEART RATE: 79 BPM | WEIGHT: 254.6 LBS | OXYGEN SATURATION: 94 % | DIASTOLIC BLOOD PRESSURE: 76 MMHG | BODY MASS INDEX: 31.65 KG/M2

## 2023-03-01 DIAGNOSIS — G47.33 OBSTRUCTIVE SLEEP APNEA SYNDROME: ICD-10-CM

## 2023-03-01 DIAGNOSIS — I10 ESSENTIAL HYPERTENSION: Chronic | ICD-10-CM

## 2023-03-01 DIAGNOSIS — Z79.899 MEDICATION MANAGEMENT: ICD-10-CM

## 2023-03-01 DIAGNOSIS — R35.1 BENIGN PROSTATIC HYPERPLASIA WITH NOCTURIA: ICD-10-CM

## 2023-03-01 DIAGNOSIS — I25.10 CORONARY ARTERY DISEASE INVOLVING NATIVE CORONARY ARTERY OF NATIVE HEART WITHOUT ANGINA PECTORIS: Chronic | ICD-10-CM

## 2023-03-01 DIAGNOSIS — E11.29 TYPE 2 DIABETES MELLITUS WITH MICROALBUMINURIA, WITHOUT LONG-TERM CURRENT USE OF INSULIN (HCC): Primary | ICD-10-CM

## 2023-03-01 DIAGNOSIS — E66.01 MORBID OBESITY (HCC): ICD-10-CM

## 2023-03-01 DIAGNOSIS — R80.9 TYPE 2 DIABETES MELLITUS WITH MICROALBUMINURIA, WITHOUT LONG-TERM CURRENT USE OF INSULIN (HCC): Primary | ICD-10-CM

## 2023-03-01 DIAGNOSIS — Z12.5 PROSTATE CANCER SCREENING: ICD-10-CM

## 2023-03-01 DIAGNOSIS — E78.5 HYPERLIPIDEMIA LDL GOAL <70: ICD-10-CM

## 2023-03-01 DIAGNOSIS — N40.1 BENIGN PROSTATIC HYPERPLASIA WITH NOCTURIA: ICD-10-CM

## 2023-03-01 DIAGNOSIS — H46.9 OPTIC NEURITIS: ICD-10-CM

## 2023-03-01 DIAGNOSIS — R41.89 COGNITIVE IMPAIRMENT: ICD-10-CM

## 2023-03-01 PROCEDURE — G8427 DOCREV CUR MEDS BY ELIG CLIN: HCPCS | Performed by: INTERNAL MEDICINE

## 2023-03-01 PROCEDURE — 1123F ACP DISCUSS/DSCN MKR DOCD: CPT | Performed by: INTERNAL MEDICINE

## 2023-03-01 PROCEDURE — G8484 FLU IMMUNIZE NO ADMIN: HCPCS | Performed by: INTERNAL MEDICINE

## 2023-03-01 PROCEDURE — 3074F SYST BP LT 130 MM HG: CPT | Performed by: INTERNAL MEDICINE

## 2023-03-01 PROCEDURE — 99214 OFFICE O/P EST MOD 30 MIN: CPT | Performed by: INTERNAL MEDICINE

## 2023-03-01 PROCEDURE — 2022F DILAT RTA XM EVC RTNOPTHY: CPT | Performed by: INTERNAL MEDICINE

## 2023-03-01 PROCEDURE — G8417 CALC BMI ABV UP PARAM F/U: HCPCS | Performed by: INTERNAL MEDICINE

## 2023-03-01 PROCEDURE — 1036F TOBACCO NON-USER: CPT | Performed by: INTERNAL MEDICINE

## 2023-03-01 PROCEDURE — 3046F HEMOGLOBIN A1C LEVEL >9.0%: CPT | Performed by: INTERNAL MEDICINE

## 2023-03-01 PROCEDURE — 3017F COLORECTAL CA SCREEN DOC REV: CPT | Performed by: INTERNAL MEDICINE

## 2023-03-01 PROCEDURE — 3078F DIAST BP <80 MM HG: CPT | Performed by: INTERNAL MEDICINE

## 2023-03-01 RX ORDER — TAMSULOSIN HYDROCHLORIDE 0.4 MG/1
0.4 CAPSULE ORAL DAILY
Qty: 30 CAPSULE | Refills: 3 | Status: SHIPPED | OUTPATIENT
Start: 2023-03-01

## 2023-03-01 RX ORDER — TAMSULOSIN HYDROCHLORIDE 0.4 MG/1
0.4 CAPSULE ORAL DAILY
Qty: 90 CAPSULE | OUTPATIENT
Start: 2023-03-01

## 2023-03-01 SDOH — ECONOMIC STABILITY: HOUSING INSECURITY
IN THE LAST 12 MONTHS, WAS THERE A TIME WHEN YOU DID NOT HAVE A STEADY PLACE TO SLEEP OR SLEPT IN A SHELTER (INCLUDING NOW)?: NO

## 2023-03-01 SDOH — ECONOMIC STABILITY: FOOD INSECURITY: WITHIN THE PAST 12 MONTHS, THE FOOD YOU BOUGHT JUST DIDN'T LAST AND YOU DIDN'T HAVE MONEY TO GET MORE.: NEVER TRUE

## 2023-03-01 SDOH — ECONOMIC STABILITY: INCOME INSECURITY: HOW HARD IS IT FOR YOU TO PAY FOR THE VERY BASICS LIKE FOOD, HOUSING, MEDICAL CARE, AND HEATING?: NOT HARD AT ALL

## 2023-03-01 SDOH — ECONOMIC STABILITY: FOOD INSECURITY: WITHIN THE PAST 12 MONTHS, YOU WORRIED THAT YOUR FOOD WOULD RUN OUT BEFORE YOU GOT MONEY TO BUY MORE.: NEVER TRUE

## 2023-03-01 NOTE — PROGRESS NOTES
Patient: Unique Scherer. is a 75 y.o. male who presents today with the following Chief Complaint(s):  Chief Complaint   Patient presents with    Follow-up     4 mths fu-        HPI    Here today for follow-up. States that he is feeling well with no complaints. Patient's wife wife has a list of concerns. MARCELLUS-had Inspire placed in November. Is working well for him. Has been up at the appropriate level (10) for the past 2 weeks and is scheduled for follow-up sleep study next week. Continues to wake up frequently at night to urinate-getting up about 2-3 times per night. Has not improved since treating MARCELLUS with Inspire. Also having some symptoms of urinary urgency during the day. Denies urinary hesitancy or change in stream.  Wondering about treatment options. Patient has been getting ocular injections for optic neuritis. Wife was concerned when she reviewed his blood work as she saw that his creatinine was elevated. Most recent creatinine was 1.3, typically creatinine runs around 1. Has very limited NSAID use. Wife states patient continues to struggle with his memory. He is very forgetful; forgets conversations. His wife is concerned that perhaps Crestor is contributing to his forgetfulness. She is wondering if he would be better treated with Repatha or Linares Riff. Diabetes-doing well on Trulicity. Dose is recently increased from 3 mg to 4.5 mg weekly due to availability of the 3 mg dose. He is tolerating this well without difficulty. His wife is concerned that he is also on glipizide which she has researched and found to be bad for patients with heart disease. Coronary artery disease-stable. Denies chest pain. Labs reviewed from 2/23/2023 (at [facility]):  CMP: Sodium 136, potassium 5.1, glucose 132, BUN 14, creatinine 1.3, calcium 10.0, AST 29, ALT 39, alk phos 83  Lipid:  Total cholesterol 128, triglycerides 173, HDL 38, LDL 55  Urine microalbumin: Random urine microalbumin 5400, urine creatinine 119, urine microalbumin/creatinine ratio 45 (elevated)  Hemoglobin A1c 6.8  CBC: WBC 8.6, hemoglobin 13.8, hematocrit 43.1, platelets 889  Allergies   Allergen Reactions    Influenza Vaccines Rash    Lisinopril Rash      Past Medical History:   Diagnosis Date    Acid reflux     CAD (coronary artery disease)     COVID 2022    Hyperlipidemia     Hypertension     MI (myocardial infarction) ([facility] [address] 2014    Obstructive sleep apnea syndrome 2021    Type 2 diabetes mellitus without complication [facility])       Past Surgical History:   Procedure Laterality Date    CARPAL TUNNEL RELEASE Left     COLONOSCOPY      FOOT SURGERY Right 2020    fracture    LARYNGOSCOPY N/A 2022    DRUG INDUCED SLEEP ENDOSCOPY performed by Luz Oliver DO at [address]    PTCA  2104    PTCA/Stent x 1 vessel.  following MI    STIMULATOR SURGERY N/A 10/24/2022    INSPIRE DEVICE PLACEMENT performed by Luz Oliver DO at [address] Left       Social History     Socioeconomic History    Marital status:      Spouse name: Not on file    Number of children: Not on file    Years of education: Not on file    Highest education level: Not on file   Occupational History    Not on file   Tobacco Use    Smoking status: Former     Packs/day: 1.00     Years: 40.00     Pack years: 40.00     Types: Cigarettes     Start date: 0     Quit date: 2020     Years since quittin.7    Smokeless tobacco: Never   Vaping Use    Vaping Use: Never used   Substance and Sexual Activity    Alcohol use: Not Currently    Drug use: Never    Sexual activity: Yes     Partners: Female   Other Topics Concern    Not on file   Social History Narrative    Not on file     Social Determinants of Health     Financial Resource Strain: Low Risk     Difficulty of Paying Living Expenses: Not hard at all   Food Insecurity: No Food Insecurity    Worried About [address] in the Last Year: Never true    Ran Out of Food in the Last Year: Never true   Transportation Needs: Unknown    Lack of Transportation (Medical): Not on file    Lack of Transportation (Non-Medical):  No   Physical Activity: Insufficiently Active    Days of Exercise per Week: 1 day    Minutes of Exercise per Session: 10 min   Stress: Not on file   Social Connections: Not on file   Intimate Partner Violence: Not on file   Housing Stability: Unknown    Unable to Pay for Housing in the Last Year: Not on file    Number of Places Lived in the Last Year: Not on file    Unstable Housing in the Last Year: No     Family History   Problem Relation Age of Onset    Breast Cancer Mother 52    Heart Attack Father 64    Hypertension Father     Cancer Sister 43    Drug Abuse Sister 62            Heart Attack Maternal Grandfather 52    Heart Attack Son 34        Outpatient Medications Prior to Visit   Medication Sig Dispense Refill    Dulaglutide (TRULICITY) 4.5 IE/3.0SN SOPN Inject 4.5 mg into the skin once a week 4 Adjustable Dose Pre-filled Pen Syringe 3    ondansetron (ZOFRAN) 4 MG tablet Take 1 tablet by mouth every 8 hours as needed for Nausea or Vomiting 15 tablet 1    losartan (COZAAR) 50 MG tablet TAKE 1 TABLET BY MOUTH DAILY 90 tablet 1    omeprazole (PRILOSEC) 20 MG delayed release capsule TAKE 1 CAPSULE BY MOUTH DAILY 90 capsule 3    glipiZIDE (GLUCOTROL XL) 5 MG extended release tablet TAKE 1 TABLET BY MOUTH EVERY DAY IN THE MORNING 90 tablet 3    carvedilol (COREG) 6.25 MG tablet Take 1 tablet by mouth 2 times daily 180 tablet 3    metFORMIN (GLUCOPHAGE-XR) 750 MG extended release tablet TAKE 2 TABLETS BY MOUTH DAILY WITH BREAKFAST 180 tablet 3    FARXIGA 10 MG tablet TAKE 1 TABLET BY MOUTH EVERY MORNING 90 tablet 3    Accu-Chek FastClix Lancets MISC USE AS DIRECTED TWICE DAILY 102 each 5    blood glucose test strips (ACCU-CHEK GUIDE) strip TEST TWICE DAILY AND AS NEEDED FOR SYMPTOMS OF IRREGULAR BLOOD GLUCOSE 100 strip 5 ranolazine (RANEXA) 500 MG extended release tablet TAKE 1 TABLET BY MOUTH TWICE DAILY 180 tablet 3    blood glucose monitor kit and supplies Check sugars BID and prn 1 kit 0    aspirin 81 MG chewable tablet Take 81 mg by mouth daily       No facility-administered medications prior to visit. Patient'spast medical history, surgical history, family history, medications,  and allergies  were all reviewed and updated as appropriate today. Review of Systems   Constitutional:  Negative for appetite change, fatigue and fever. Respiratory:  Negative for chest tightness and shortness of breath. Cardiovascular:  Negative for chest pain. Gastrointestinal:  Negative for constipation and diarrhea. Skin:  Negative for rash. /76   Pulse 79   Ht 6' 3\" (1.905 m)   Wt 254 lb 9.6 oz (115.5 kg)   SpO2 94%   BMI 31.82 kg/m²   Physical Exam  Vitals and nursing note reviewed. Constitutional:       Appearance: He is well-developed. He is not toxic-appearing. HENT:      Head: Normocephalic. Right Ear: Tympanic membrane, ear canal and external ear normal.      Left Ear: Tympanic membrane, ear canal and external ear normal.      Mouth/Throat:      Pharynx: No oropharyngeal exudate or posterior oropharyngeal erythema. Eyes:      General: No scleral icterus. Extraocular Movements: Extraocular movements intact. Conjunctiva/sclera: Conjunctivae normal.      Pupils: Pupils are equal, round, and reactive to light. Neck:      Thyroid: No thyroid mass or thyromegaly. Vascular: No carotid bruit. Cardiovascular:      Rate and Rhythm: Normal rate and regular rhythm. Heart sounds: Normal heart sounds. No murmur heard. Pulmonary:      Effort: Pulmonary effort is normal.      Breath sounds: Normal breath sounds. Musculoskeletal:      Right lower leg: No edema. Left lower leg: No edema. Lymphadenopathy:      Cervical: No cervical adenopathy.    Neurological:      General: No focal deficit present.      Mental Status: He is alert and oriented to person, place, and time.   Psychiatric:         Mood and Affect: Mood normal.         Behavior: Behavior normal. Behavior is cooperative.       ASSESSMENT/PLAN:    Problem List Items Addressed This Visit       Coronary artery disease involving native coronary artery of native heart without angina pectoris (Chronic)      Asymptomatic.  Scheduled for follow-up appointment with Dr. Salomon in April.  Continue losartan 50 mg daily, carvedilol 6.25 mg twice daily, Ranexa 500 mg twice daily, and aspirin.  Discontinue Crestor to see if memory improves.  If memory does improve we will need to consider PSK 9.         Essential hypertension (Chronic)      Well-controlled.  Continue losartan 50 mg daily and carvedilol 6.25 mg twice daily.         RESOLVED: Non morbid obesity, unspecified obesity type (Chronic)               Benign prostatic hyperplasia with nocturia     Symptomatic with nocturia.  Add Flomax 0.4 mg nightly.  Advised patient and his wife that it may take up to 6 weeks before Flomax may be fully effective.                                                                                       BMI 31.0-31.9,adult     Comorbidities of diabetes type 2, hypertension, MARCELLUS, heart disease.  Working on weight loss.         Cognitive impairment     MARCELLUS has only been adequately treated for about 2 weeks.  Has not yet noticed improvement in cognition.  Hold Crestor x1 month to see if that impacts his memory.  Referral placed to neuropsych for cognitive testing.          Relevant Orders    External Referral To Psychology    Hyperlipidemia LDL goal <70      Well-controlled on Crestor 20 mg daily but patient's wife is concerned that Crestor is contributing to his memory difficulties.  This is not an unreasonable concern.  We will have patient hold Crestor for 1 month and see if that makes a difference with regards to his memory.  If memory improves, will  discontinue Crestor and try to get patient approved for PSK 9. Obstructive sleep apnea syndrome      Did not tolerate CPAP. Doing well with Inspire. He has only been at maximal treatment for 2 weeks. Advised patient and his wife that they may see cognitive improvement over the next several months with ongoing treatment. Optic neuritis     Likely related to untreated obstructive sleep apnea. Currently undergoing ocular injections. Follows with Dr. Kristan Capellan. Type 2 diabetes mellitus with microalbuminuria, without long-term current use of insulin ([facility]) - Primary      Stable with hemoglobin A1c of 6.8%. Continue Trulicity 4.5 mg weekly, metformin ER 1000 mg twice daily, Farxiga 10 mg daily, and glipizide ER 5 mg daily. Reviewed with patient's wife that if his hemoglobin A1c improves we will discontinue glipizide and that the benefits of blood sugar control outweigh the risks of glipizide.          Relevant Orders    Comprehensive Metabolic Panel    Hemoglobin A1C    Microalbumin / Creatinine Urine Ratio     Other Visit Diagnoses       Prostate cancer screening        Relevant Orders    PSA Screening    Medication management        Relevant Orders    Vitamin B12 & Folate            Current Outpatient Medications   Medication Sig Dispense Refill    tamsulosin (FLOMAX) 0.4 MG capsule Take 1 capsule by mouth daily 30 capsule 3    Dulaglutide (TRULICITY) 4.5 XZ/8.6CP SOPN Inject 4.5 mg into the skin once a week 4 Adjustable Dose Pre-filled Pen Syringe 3    ondansetron (ZOFRAN) 4 MG tablet Take 1 tablet by mouth every 8 hours as needed for Nausea or Vomiting 15 tablet 1    losartan (COZAAR) 50 MG tablet TAKE 1 TABLET BY MOUTH DAILY 90 tablet 1    omeprazole (PRILOSEC) 20 MG delayed release capsule TAKE 1 CAPSULE BY MOUTH DAILY 90 capsule 3    glipiZIDE (GLUCOTROL XL) 5 MG extended release tablet TAKE 1 TABLET BY MOUTH EVERY DAY IN THE MORNING 90 tablet 3    carvedilol (COREG) 6.25 MG tablet Take 1 tablet by mouth 2 times daily 180 tablet 3    metFORMIN (GLUCOPHAGE-XR) 750 MG extended release tablet TAKE 2 TABLETS BY MOUTH DAILY WITH BREAKFAST 180 tablet 3    FARXIGA 10 MG tablet TAKE 1 TABLET BY MOUTH EVERY MORNING 90 tablet 3    Accu-Chek FastClix Lancets MISC USE AS DIRECTED TWICE DAILY 102 each 5    blood glucose test strips (ACCU-CHEK GUIDE) strip TEST TWICE DAILY AND AS NEEDED FOR SYMPTOMS OF IRREGULAR BLOOD GLUCOSE 100 strip 5    ranolazine (RANEXA) 500 MG extended release tablet TAKE 1 TABLET BY MOUTH TWICE DAILY 180 tablet 3    blood glucose monitor kit and supplies Check sugars BID and prn 1 kit 0    aspirin 81 MG chewable tablet Take 81 mg by mouth daily       No current facility-administered medications for this visit. Return in about 4 months (around 7/1/2023).

## 2023-03-02 PROBLEM — U07.1 COVID: Status: RESOLVED | Noted: 2022-12-28 | Resolved: 2023-03-02

## 2023-03-02 PROBLEM — E66.9 NON MORBID OBESITY, UNSPECIFIED OBESITY TYPE: Chronic | Status: RESOLVED | Noted: 2021-05-18 | Resolved: 2023-03-02

## 2023-03-02 PROBLEM — H53.132 SUDDEN VISUAL LOSS OF LEFT EYE: Status: RESOLVED | Noted: 2020-03-23 | Resolved: 2023-03-02

## 2023-03-02 PROBLEM — R35.1 BENIGN PROSTATIC HYPERPLASIA WITH NOCTURIA: Status: ACTIVE | Noted: 2023-03-02

## 2023-03-02 PROBLEM — N40.1 BENIGN PROSTATIC HYPERPLASIA WITH NOCTURIA: Status: ACTIVE | Noted: 2023-03-02

## 2023-03-02 PROBLEM — R29.898 WEAKNESS OF BOTH HIPS: Status: RESOLVED | Noted: 2020-09-01 | Resolved: 2023-03-02

## 2023-03-02 PROBLEM — M25.512 ACUTE PAIN OF LEFT SHOULDER: Status: RESOLVED | Noted: 2022-06-26 | Resolved: 2023-03-02

## 2023-03-02 ASSESSMENT — ENCOUNTER SYMPTOMS
SHORTNESS OF BREATH: 0
DIARRHEA: 0
CONSTIPATION: 0
CHEST TIGHTNESS: 0

## 2023-03-02 NOTE — ASSESSMENT & PLAN NOTE
Stable with hemoglobin A1c of 6.8%. Continue Trulicity 4.5 mg weekly, metformin ER 1000 mg twice daily, Farxiga 10 mg daily, and glipizide ER 5 mg daily. Reviewed with patient's wife that if his hemoglobin A1c improves we will discontinue glipizide and that the benefits of blood sugar control outweigh the risks of glipizide.

## 2023-03-02 NOTE — ASSESSMENT & PLAN NOTE
Did not tolerate CPAP. Doing well with Inspire. He has only been at maximal treatment for 2 weeks. Advised patient and his wife that they may see cognitive improvement over the next several months with ongoing treatment.

## 2023-03-02 NOTE — ASSESSMENT & PLAN NOTE
Asymptomatic. Scheduled for follow-up appointment with Dr. Karen Montoya in April. Continue losartan 50 mg daily, carvedilol 6.25 mg twice daily, Ranexa 500 mg twice daily, and aspirin. Discontinue Crestor to see if memory improves. If memory does improve we will need to consider PSK 9.

## 2023-03-02 NOTE — ASSESSMENT & PLAN NOTE
Likely related to untreated obstructive sleep apnea. Currently undergoing ocular injections. Follows with Dr. Peace Mcgraw.

## 2023-03-02 NOTE — ASSESSMENT & PLAN NOTE
Well-controlled on Crestor 20 mg daily but patient's wife is concerned that Crestor is contributing to his memory difficulties. This is not an unreasonable concern. We will have patient hold Crestor for 1 month and see if that makes a difference with regards to his memory. If memory improves, will discontinue Crestor and try to get patient approved for PSK 9.

## 2023-03-02 NOTE — ASSESSMENT & PLAN NOTE
Symptomatic with nocturia. Add Flomax 0.4 mg nightly. Advised patient and his wife that it may take up to 6 weeks before Flomax may be fully effective.

## 2023-03-02 NOTE — ASSESSMENT & PLAN NOTE
MARCELLUS has only been adequately treated for about 2 weeks. Has not yet noticed improvement in cognition. Hold Crestor x1 month to see if that impacts his memory. Referral placed to neuropsych for cognitive testing.

## 2023-03-05 ENCOUNTER — HOSPITAL ENCOUNTER (OUTPATIENT)
Dept: SLEEP CENTER | Age: 68
Discharge: HOME | End: 2023-03-05
Payer: MEDICARE

## 2023-03-05 DIAGNOSIS — G47.10 HYPERSOMNIA: ICD-10-CM

## 2023-03-05 DIAGNOSIS — G47.33 OBSTRUCTIVE SLEEP APNEA SYNDROME: ICD-10-CM

## 2023-03-05 PROCEDURE — 95810 POLYSOM 6/> YRS 4/> PARAM: CPT

## 2023-03-06 PROCEDURE — 95810 POLYSOM 6/> YRS 4/> PARAM: CPT | Performed by: INTERNAL MEDICINE

## 2023-03-07 ENCOUNTER — PATIENT MESSAGE (OUTPATIENT)
Dept: INTERNAL MEDICINE CLINIC | Age: 68
End: 2023-03-07

## 2023-03-07 DIAGNOSIS — R41.89 COGNITIVE IMPAIRMENT: Primary | ICD-10-CM

## 2023-03-07 RX ORDER — RANOLAZINE 500 MG/1
TABLET, EXTENDED RELEASE ORAL
Qty: 180 TABLET | Refills: 1 | Status: SHIPPED | OUTPATIENT
Start: 2023-03-07

## 2023-03-07 RX ORDER — ROSUVASTATIN CALCIUM 20 MG/1
20 TABLET, COATED ORAL DAILY
Qty: 90 TABLET | Refills: 3 | OUTPATIENT
Start: 2023-03-07

## 2023-03-07 NOTE — TELEPHONE ENCOUNTER
Patient go confused with changes in schedule that we have been working on due to corona virus. Answered all questions as she was sore post treatment, encouraged to ice, to keep moving but less intense, use cpm as warm up, modify as needed to supine versus upright to keep moving.     Please fax referral:    Dr. Abdirahman Fowler in [facility].  Fax number [phone]

## 2023-03-10 ENCOUNTER — PATIENT MESSAGE (OUTPATIENT)
Dept: INTERNAL MEDICINE CLINIC | Age: 68
End: 2023-03-10

## 2023-03-10 DIAGNOSIS — J43.2 CENTRILOBULAR EMPHYSEMA (HCC): Primary | ICD-10-CM

## 2023-03-10 NOTE — TELEPHONE ENCOUNTER
Per message sent from patient's wife via [address] on her account:  \"I know I spelled it wrong. The computers were down on March 1st so some things got missed. Ruel's blood oxygen reading was 91 in the office. The lady told Ruel to take deep breaths, it went to 93. When he did the sleep study they put oxygen on him. So last night I told him when he gets up during the night to take his reading. It was 89. I don't know how long his readings have been low like this. Please advise.  Thank you, THTEB\"

## 2023-03-13 ENCOUNTER — TELEPHONE (OUTPATIENT)
Dept: INTERNAL MEDICINE CLINIC | Age: 68
End: 2023-03-13

## 2023-03-13 NOTE — TELEPHONE ENCOUNTER
Pt's wife was called to schedule appointment with Dr. Parth Bryant. Pt was advised Dr. Parth Bryant is out of office next week. Dr. Parth Bryant advised me that we can just schedule for the next available. Pts wife hung up on me so if Pt calls back please get them scheduled.

## 2023-03-16 ENCOUNTER — HOSPITAL ENCOUNTER (OUTPATIENT)
Dept: PULMONOLOGY | Age: 68
Discharge: HOME | End: 2023-03-16
Payer: MEDICARE

## 2023-03-16 DIAGNOSIS — J43.2 CENTRILOBULAR EMPHYSEMA (HCC): ICD-10-CM

## 2023-03-16 LAB
DLCO %PRED: 84 %
DLCO PRED: NORMAL
DLCO/VA %PRED: NORMAL
DLCO/VA PRED: NORMAL
DLCO/VA: NORMAL
DLCO: NORMAL
EXPIRATORY TIME-POST: NORMAL
EXPIRATORY TIME: NORMAL
FEF 25-75% %CHNG: NORMAL
FEF 25-75% %PRED-POST: NORMAL
FEF 25-75% %PRED-PRE: NORMAL
FEF 25-75% PRED: NORMAL
FEF 25-75%-POST: NORMAL
FEF 25-75%-PRE: NORMAL
FEV1 %PRED-POST: 62 %
FEV1 %PRED-PRE: 62 %
FEV1 PRED: NORMAL
FEV1-POST: NORMAL
FEV1-PRE: NORMAL
FEV1/FVC %PRED-POST: NORMAL
FEV1/FVC %PRED-PRE: NORMAL
FEV1/FVC PRED: NORMAL
FEV1/FVC-POST: 60 %
FEV1/FVC-PRE: 59 %
FVC %PRED-POST: NORMAL
FVC %PRED-PRE: NORMAL
FVC PRED: NORMAL
FVC-POST: NORMAL
FVC-PRE: NORMAL
GAW %PRED: NORMAL
GAW PRED: NORMAL
GAW: NORMAL
IC %PRED: NORMAL
IC PRED: NORMAL
IC: NORMAL
MEP: NORMAL
MIP: NORMAL
MVV %PRED-PRE: NORMAL
MVV PRED: NORMAL
MVV-PRE: NORMAL
PEF %PRED-POST: NORMAL
PEF %PRED-PRE: NORMAL
PEF PRED: NORMAL
PEF%CHNG: NORMAL
PEF-POST: NORMAL
PEF-PRE: NORMAL
RAW %PRED: NORMAL
RAW PRED: NORMAL
RAW: NORMAL
RV %PRED: NORMAL
RV PRED: NORMAL
RV: NORMAL
SVC %PRED: NORMAL
SVC PRED: NORMAL
SVC: NORMAL
TLC %PRED: 102 %
TLC PRED: NORMAL
TLC: NORMAL
VA %PRED: NORMAL
VA PRED: NORMAL
VA: NORMAL
VTG %PRED: NORMAL
VTG PRED: NORMAL
VTG: NORMAL

## 2023-03-16 PROCEDURE — 94729 DIFFUSING CAPACITY: CPT

## 2023-03-16 PROCEDURE — 94726 PLETHYSMOGRAPHY LUNG VOLUMES: CPT

## 2023-03-16 PROCEDURE — 6370000000 HC RX 637 (ALT 250 FOR IP): Performed by: INTERNAL MEDICINE

## 2023-03-16 PROCEDURE — 94760 N-INVAS EAR/PLS OXIMETRY 1: CPT

## 2023-03-16 PROCEDURE — 94060 EVALUATION OF WHEEZING: CPT

## 2023-03-16 RX ORDER — ALBUTEROL SULFATE 90 UG/1
4 AEROSOL, METERED RESPIRATORY (INHALATION) ONCE
Status: COMPLETED | OUTPATIENT
Start: 2023-03-16 | End: 2023-03-16

## 2023-03-16 RX ADMIN — Medication 4 PUFF: at 15:17

## 2023-03-16 ASSESSMENT — PULMONARY FUNCTION TESTS
FEV1/FVC_PRE: 59
FEV1_PERCENT_PREDICTED_PRE: 62
FEV1_PERCENT_PREDICTED_POST: 62
FEV1/FVC_POST: 60

## 2023-03-30 ENCOUNTER — OFFICE VISIT (OUTPATIENT)
Dept: PULMONOLOGY | Age: 68
End: 2023-03-30
Payer: MEDICARE

## 2023-03-30 DIAGNOSIS — G47.33 OBSTRUCTIVE SLEEP APNEA SYNDROME: Primary | ICD-10-CM

## 2023-03-30 DIAGNOSIS — G47.10 HYPERSOMNIA: ICD-10-CM

## 2023-03-30 PROCEDURE — 3017F COLORECTAL CA SCREEN DOC REV: CPT | Performed by: INTERNAL MEDICINE

## 2023-03-30 PROCEDURE — 99215 OFFICE O/P EST HI 40 MIN: CPT | Performed by: INTERNAL MEDICINE

## 2023-03-30 PROCEDURE — 1123F ACP DISCUSS/DSCN MKR DOCD: CPT | Performed by: INTERNAL MEDICINE

## 2023-03-30 PROCEDURE — G8484 FLU IMMUNIZE NO ADMIN: HCPCS | Performed by: INTERNAL MEDICINE

## 2023-03-30 PROCEDURE — 1036F TOBACCO NON-USER: CPT | Performed by: INTERNAL MEDICINE

## 2023-03-30 PROCEDURE — 95976 ALYS SMPL CN NPGT PRGRMG: CPT | Performed by: INTERNAL MEDICINE

## 2023-03-30 PROCEDURE — G8427 DOCREV CUR MEDS BY ELIG CLIN: HCPCS | Performed by: INTERNAL MEDICINE

## 2023-03-30 PROCEDURE — G8417 CALC BMI ABV UP PARAM F/U: HCPCS | Performed by: INTERNAL MEDICINE

## 2023-03-30 ASSESSMENT — ENCOUNTER SYMPTOMS
EYES NEGATIVE: 1
ALLERGIC/IMMUNOLOGIC NEGATIVE: 1
RESPIRATORY NEGATIVE: 1
GASTROINTESTINAL NEGATIVE: 1

## 2023-03-30 NOTE — LETTER
March 30, 2023       Wandalee Nissen. YOB: 1955   [address] [employer] [address] [place] [id number] Date of Visit:  3/30/2023       3/30/23        Meghan Nissen. I have seen this patient in the office today and wanted to communicate my findings and recommendations. Patient Instructions         ASSESSMENT/PLAN:   Diagnosis Orders   1. Obstructive sleep apnea syndrome        2. Hypersomnia            Sleep study to qualify for inspire    BMI at the time of of implantation was 31      Drug-induced sleep endoscopy  Date of Procedure: 09/13/22  Time: 0715     Pre Operative Diagnoses: Obstructive Sleep Apnea  Post Operative Diagnoses:  Obstructive Sleep Apnea           Procedure:  1. Drug Induced Sleep endoscopy (02770)       Surgeon: Malu Charles DO  A mild lateral wall component was noted, but the palatal collapse was primarily an anterior posterior fashion. More distally, mild lateral oropharyngeal wall component was noted, but again no complete lateral oropharyngeal collapse. 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 In summary, there is no evidence of complete concentric palatal obstruction and does appear to be a candidate anatomically for hypoglossal nerve stimulation therapy. Inspire implantation  Date of Operation:   10/24/22       Post-operative Diagnosis:   Obstructive sleep apnea     Operative Procedure:   Procedure(s):  INSPIRE DEVICE PLACEMENT (N/A)      Attending Surgeon:   Malu Charles DO      Patient has come in for sleep activation of inspire on 12/8/2022  Neurostimulator Reprogramming  Approximately 20 minutes was spent analyzing the airway and programming the device. Sensing voltage:  0.5 volts  Starting amplitude: 1.0 volts with the default [+ - +] electrode configuration.   Starting Range:  · Lower Limit: 1.0 volts  · Upper Limit: 2.0 volts

## 2023-03-30 NOTE — PROGRESS NOTES
Lucero Santana. (: 1955 ) is a 75 y.o. male here for an evaluation of No chief complaint on file. ASSESSMENT/PLAN:   Diagnosis Orders   1. Obstructive sleep apnea syndrome        2. Hypersomnia            Sleep study to qualify for inspire    BMI at the time of of implantation was 31      Drug-induced sleep endoscopy  Date of Procedure: 22  Time: 0715     Pre Operative Diagnoses: Obstructive Sleep Apnea  Post Operative Diagnoses:  Obstructive Sleep Apnea           Procedure:  1. Drug Induced Sleep endoscopy (94535)       Surgeon: Lewis Burkett DO  A mild lateral wall component was noted, but the palatal collapse was primarily an anterior posterior fashion. More distally, mild lateral oropharyngeal wall component was noted, but again no complete lateral oropharyngeal collapse. 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 In summary, there is no evidence of complete concentric palatal obstruction and does appear to be a candidate anatomically for hypoglossal nerve stimulation therapy. Inspire implantation  Date of Operation:   10/24/22       Post-operative Diagnosis:   Obstructive sleep apnea     Operative Procedure:   Procedure(s):  INSPIRE DEVICE PLACEMENT (N/A)      Attending Surgeon:   Lewis Burkett DO      Patient has come in for sleep activation of inspire on 2022  Neurostimulator Reprogramming  Approximately 20 minutes was spent analyzing the airway and programming the device. Sensing voltage:  0.5 volts  Starting amplitude: 1.0 volts with the default [+ - +] electrode configuration.   Starting Range:  Lower Limit: 1.0 volts  Upper Limit: 2.0 volts            Rate: Freq 33 hz  And 90 pulse width  Amplitude 1.0    Start delay 30minutes  Pause delay 15minutes  Duration 8hours      Continue to increase the level up by 1 level point every 7 days, adjust to comfort, if it feels

## 2023-03-30 NOTE — PROGRESS NOTES
MA Communication:   The following orders are received by verbal communication from Allie Cerda MD    Orders include:  4 wk fu scheduled 4/27/23       ONPO in 2 weeks (pt to call with name of DME)

## 2023-03-30 NOTE — PATIENT INSTRUCTIONS
ASSESSMENT/PLAN:   Diagnosis Orders   1. Obstructive sleep apnea syndrome        2. Hypersomnia            Sleep study to qualify for inspire    BMI at the time of of implantation was 31      Drug-induced sleep endoscopy  Date of Procedure: 09/13/22  Time: 0715     Pre Operative Diagnoses: Obstructive Sleep Apnea  Post Operative Diagnoses:  Obstructive Sleep Apnea           Procedure:  1. Drug Induced Sleep endoscopy (11332)       Surgeon: Anjali De La Fuente DO  A mild lateral wall component was noted, but the palatal collapse was primarily an anterior posterior fashion. More distally, mild lateral oropharyngeal wall component was noted, but again no complete lateral oropharyngeal collapse. 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 In summary, there is no evidence of complete concentric palatal obstruction and does appear to be a candidate anatomically for hypoglossal nerve stimulation therapy. Inspire implantation  Date of Operation:   10/24/22       Post-operative Diagnosis:   Obstructive sleep apnea     Operative Procedure:   Procedure(s):  INSPIRE DEVICE PLACEMENT (N/A)      Attending Surgeon:   Anjali De La Fuente DO      Patient has come in for sleep activation of inspire on 12/8/2022  Neurostimulator Reprogramming  Approximately 20 minutes was spent analyzing the airway and programming the device. Sensing voltage:  0.5 volts  Starting amplitude: 1.0 volts with the default [+ - +] electrode configuration. Starting Range:  Lower Limit: 1.0 volts  Upper Limit: 2.0 volts            Rate: Freq 33 hz  And 90 pulse width  Amplitude 1.0    Start delay 30minutes  Pause delay 15minutes  Duration 8hours      Continue to increase the level up by 1 level point every 7 days, adjust to comfort, if it feels uncomfortable drop it back by 1 level.       Inspire follow-up on 1/19/2023  Neurostimulator Reprogramming  Approximately Continue current meds.  Monitor for any respiratory problems or difficulty swallowing.

## 2023-03-31 ENCOUNTER — TELEPHONE (OUTPATIENT)
Dept: PULMONOLOGY | Age: 68
End: 2023-03-31

## 2023-03-31 ENCOUNTER — OFFICE VISIT (OUTPATIENT)
Dept: INTERNAL MEDICINE CLINIC | Age: 68
End: 2023-03-31
Payer: MEDICARE

## 2023-03-31 VITALS
DIASTOLIC BLOOD PRESSURE: 62 MMHG | HEART RATE: 80 BPM | BODY MASS INDEX: 31.57 KG/M2 | OXYGEN SATURATION: 95 % | WEIGHT: 252.6 LBS | SYSTOLIC BLOOD PRESSURE: 96 MMHG

## 2023-03-31 DIAGNOSIS — R41.89 COGNITIVE IMPAIRMENT: ICD-10-CM

## 2023-03-31 DIAGNOSIS — G47.33 OBSTRUCTIVE SLEEP APNEA SYNDROME: ICD-10-CM

## 2023-03-31 DIAGNOSIS — N40.1 BENIGN PROSTATIC HYPERPLASIA WITH NOCTURIA: ICD-10-CM

## 2023-03-31 DIAGNOSIS — R35.1 BENIGN PROSTATIC HYPERPLASIA WITH NOCTURIA: ICD-10-CM

## 2023-03-31 DIAGNOSIS — N52.9 ERECTILE DYSFUNCTION, UNSPECIFIED ERECTILE DYSFUNCTION TYPE: ICD-10-CM

## 2023-03-31 DIAGNOSIS — J44.9 COPD, MODERATE (HCC): Primary | ICD-10-CM

## 2023-03-31 PROCEDURE — 3023F SPIROM DOC REV: CPT | Performed by: INTERNAL MEDICINE

## 2023-03-31 PROCEDURE — 3078F DIAST BP <80 MM HG: CPT | Performed by: INTERNAL MEDICINE

## 2023-03-31 PROCEDURE — 3017F COLORECTAL CA SCREEN DOC REV: CPT | Performed by: INTERNAL MEDICINE

## 2023-03-31 PROCEDURE — G8427 DOCREV CUR MEDS BY ELIG CLIN: HCPCS | Performed by: INTERNAL MEDICINE

## 2023-03-31 PROCEDURE — 3074F SYST BP LT 130 MM HG: CPT | Performed by: INTERNAL MEDICINE

## 2023-03-31 PROCEDURE — 99214 OFFICE O/P EST MOD 30 MIN: CPT | Performed by: INTERNAL MEDICINE

## 2023-03-31 PROCEDURE — G8484 FLU IMMUNIZE NO ADMIN: HCPCS | Performed by: INTERNAL MEDICINE

## 2023-03-31 PROCEDURE — 1123F ACP DISCUSS/DSCN MKR DOCD: CPT | Performed by: INTERNAL MEDICINE

## 2023-03-31 PROCEDURE — 1036F TOBACCO NON-USER: CPT | Performed by: INTERNAL MEDICINE

## 2023-03-31 PROCEDURE — G8417 CALC BMI ABV UP PARAM F/U: HCPCS | Performed by: INTERNAL MEDICINE

## 2023-03-31 RX ORDER — DUTASTERIDE 0.5 MG/1
0.5 CAPSULE, LIQUID FILLED ORAL DAILY
Qty: 30 CAPSULE | Refills: 3 | Status: SHIPPED | OUTPATIENT
Start: 2023-03-31

## 2023-03-31 RX ORDER — ALBUTEROL SULFATE 90 UG/1
2 AEROSOL, METERED RESPIRATORY (INHALATION) 4 TIMES DAILY PRN
Qty: 18 G | Refills: 0 | Status: SHIPPED | OUTPATIENT
Start: 2023-03-31

## 2023-03-31 ASSESSMENT — PATIENT HEALTH QUESTIONNAIRE - PHQ9
SUM OF ALL RESPONSES TO PHQ QUESTIONS 1-9: 0
SUM OF ALL RESPONSES TO PHQ QUESTIONS 1-9: 0
1. LITTLE INTEREST OR PLEASURE IN DOING THINGS: 0
SUM OF ALL RESPONSES TO PHQ QUESTIONS 1-9: 0
SUM OF ALL RESPONSES TO PHQ9 QUESTIONS 1 & 2: 0
2. FEELING DOWN, DEPRESSED OR HOPELESS: 0
SUM OF ALL RESPONSES TO PHQ QUESTIONS 1-9: 0

## 2023-03-31 NOTE — TELEPHONE ENCOUNTER
Patient's wife called and LVM stating when they got home his inspire was not working and the battery was hot to the touch and would like someone to call them please.  Thanks

## 2023-03-31 NOTE — PROGRESS NOTES
alirocumab (PRALUENT) 75 MG/ML SOAJ injection pen Inject 1 mL into the skin every 14 days 2 Adjustable Dose Pre-filled Pen Syringe 5     No current facility-administered medications for this visit. No follow-ups on file.

## 2023-03-31 NOTE — PATIENT INSTRUCTIONS
Reduce losartan to 25mg  Start injectable cholesterol medication    Continue to monitor BP.  We recommend that you use BP cuff that is less than 1years old    Your stent is in the 1st obtuse marginal coronary artery

## 2023-03-31 NOTE — PROGRESS NOTES
answered. CODING   SCI (61544) - 30-39 mins spent reviewing hx/tests/consults, performing exam, counseling/educating, ordering meds/tests/procedures, referring/communicating w/PCP/consultants, documenting in EMR, interpreting results, communicating medical information and plan with family. SCRIBE ATTESTATION   Nurse Kelechi Castro, shwetha scribing for and in the presence of Michelle Tanner MD.   Signed, Colton Sky RN. 3/31/2023 10:20 AM    Doctor Colton Sky is working as a scribe for and in the presence of me Michelle Tanner MD). Working as a scribe, Colton Sky may have prepopulated components of this note with my historical  intellectual property under my direct supervision. Any additions to this intellectual property were performed in my presence and at my direction. Furthermore, the content and accuracy of this note have been reviewed by me Michelle Tanner MD). ASSESSMENT AND PLAN   *CAD    Date EF Results   Sx     No concerning   [facility] 2012 11/14   PCI of Cx ([facility])  LM ok, LAD-ectatic, mild, Cx-ostial 40%, OM1 patent stent, RCA-ectatic, small PDA 85% (unchanged)([facility])   MPI 6/15  10/17    66% Abnormal scan demonstrating fixed inferior wall defect  Normal perfusion   TTE 2/15 60%     Plan     Continue aggressive medical treatment at doses above  Reduce losartan to 25mg with low bp  PCP to check TSH with fatigue.     *HTN  Status Controlled   Plan Continue current antihypertensives at doses above   *CHOL  LDL 60, 10/22   Plan Counseled on diet/exercise/weight, continue HI/MT statin   *FOLLOWUP  6 months

## 2023-03-31 NOTE — TELEPHONE ENCOUNTER
Pt TIM  last night and said when they got home and turned on his INSPIRE remote, it immediately got hot and would not turn on. She said they changed batteries and it did the same thing. I advised her to try one more time this morning with new batteries. If that does not work, then I told her to call [employer]  and ask them for direction. She will let me know what they say.

## 2023-03-31 NOTE — TELEPHONE ENCOUNTER
Wife called back and said that [employer] is going to send her  a new remote. She said they will mail it to our office and they will have to come pick it up. Will watch for it.

## 2023-04-03 RX ORDER — DUTASTERIDE 0.5 MG/1
0.5 CAPSULE, LIQUID FILLED ORAL DAILY
Qty: 90 CAPSULE | OUTPATIENT
Start: 2023-04-03

## 2023-04-04 ENCOUNTER — TELEPHONE (OUTPATIENT)
Dept: PULMONOLOGY | Age: 68
End: 2023-04-04

## 2023-04-04 NOTE — TELEPHONE ENCOUNTER
----- Message from Jerel Pittman MA sent at 3/30/2023  2:17 PM EDT -----  Pt to call with name of DME to send ONPO order to be done after 4/13/23

## 2023-04-04 NOTE — TELEPHONE ENCOUNTER
LM to see (1) if pt received new INSPIRE remote or had any updates and (2) name of DME to send his ONPO order to.

## 2023-04-05 NOTE — TELEPHONE ENCOUNTER
Per Taina Tinsley from [facility], remote is in and pt will come in tomorrow at 11:30 am to pick it up.     In regards to DME, pt said he will give name of DME tomorrow

## 2023-04-06 ENCOUNTER — OFFICE VISIT (OUTPATIENT)
Dept: CARDIOLOGY CLINIC | Age: 68
End: 2023-04-06
Payer: MEDICARE

## 2023-04-06 VITALS
DIASTOLIC BLOOD PRESSURE: 78 MMHG | SYSTOLIC BLOOD PRESSURE: 104 MMHG | BODY MASS INDEX: 31.53 KG/M2 | HEIGHT: 75 IN | WEIGHT: 253.6 LBS | HEART RATE: 78 BPM | OXYGEN SATURATION: 95 %

## 2023-04-06 DIAGNOSIS — I25.83 CORONARY ARTERY DISEASE DUE TO LIPID RICH PLAQUE: Primary | ICD-10-CM

## 2023-04-06 DIAGNOSIS — I25.10 CORONARY ARTERY DISEASE DUE TO LIPID RICH PLAQUE: Primary | ICD-10-CM

## 2023-04-06 DIAGNOSIS — I10 ESSENTIAL HYPERTENSION: ICD-10-CM

## 2023-04-06 DIAGNOSIS — E78.00 HYPERCHOLESTEROLEMIA: ICD-10-CM

## 2023-04-06 PROCEDURE — 93000 ELECTROCARDIOGRAM COMPLETE: CPT | Performed by: INTERNAL MEDICINE

## 2023-04-06 PROCEDURE — 3078F DIAST BP <80 MM HG: CPT | Performed by: INTERNAL MEDICINE

## 2023-04-06 PROCEDURE — 3017F COLORECTAL CA SCREEN DOC REV: CPT | Performed by: INTERNAL MEDICINE

## 2023-04-06 PROCEDURE — G8427 DOCREV CUR MEDS BY ELIG CLIN: HCPCS | Performed by: INTERNAL MEDICINE

## 2023-04-06 PROCEDURE — 1123F ACP DISCUSS/DSCN MKR DOCD: CPT | Performed by: INTERNAL MEDICINE

## 2023-04-06 PROCEDURE — 1036F TOBACCO NON-USER: CPT | Performed by: INTERNAL MEDICINE

## 2023-04-06 PROCEDURE — G8417 CALC BMI ABV UP PARAM F/U: HCPCS | Performed by: INTERNAL MEDICINE

## 2023-04-06 PROCEDURE — 99214 OFFICE O/P EST MOD 30 MIN: CPT | Performed by: INTERNAL MEDICINE

## 2023-04-06 PROCEDURE — 3074F SYST BP LT 130 MM HG: CPT | Performed by: INTERNAL MEDICINE

## 2023-04-06 RX ORDER — LOSARTAN POTASSIUM 25 MG/1
25 TABLET ORAL DAILY
Qty: 90 TABLET | Refills: 3 | Status: SHIPPED | OUTPATIENT
Start: 2023-04-06

## 2023-04-06 NOTE — TELEPHONE ENCOUNTER
Pt requested ONPO order be sent to [facility]. Everything faxed to them to complete ONPO in about 2 weeks.

## 2023-04-09 PROBLEM — J44.9 COPD, MODERATE (HCC): Status: ACTIVE | Noted: 2023-04-09

## 2023-04-09 NOTE — ASSESSMENT & PLAN NOTE
Wife is continuing to look for neurologist and neuropsychology who are on patient's insurance and will see him since it would be in our system referral.  I suspect undertreated obstructive sleep apnea is also contributing to his cognitive issues.

## 2023-04-09 NOTE — ASSESSMENT & PLAN NOTE
Discontinue Flomax as it worsened ED. Unable to take Cialis due to optic neuritis. Consider referral to urology.

## 2023-04-09 NOTE — ASSESSMENT & PLAN NOTE
Newly diagnosed. Patient was noted to have lower oxygen saturations (in the lower 90s) which prompted investigation. Screening CT of the chest in December 2022 did show some emphysema. PFTs did show moderate obstructive disease. .  Does have about a 96-ffut-hmlx smoking history, having quit smoking in 2020. Asymptomatic. Given albuterol to be used as needed. Referral placed to pulmonology.

## 2023-04-09 NOTE — ASSESSMENT & PLAN NOTE
Still struggling to get Inspire adjusted. Most recent sleep study showed that he had an inadequate response. Reviewed with patient and wife that this is certainly contributing to his fatigue and possibly his memory difficulties.

## 2023-04-17 ENCOUNTER — TELEPHONE (OUTPATIENT)
Dept: CARDIOLOGY CLINIC | Age: 68
End: 2023-04-17

## 2023-04-17 ENCOUNTER — TELEPHONE (OUTPATIENT)
Dept: PULMONOLOGY | Age: 68
End: 2023-04-17

## 2023-04-17 NOTE — TELEPHONE ENCOUNTER
Vanesa Berkowitz (spouse) called to inform Dr. Bety Gonzales that her  tried to go up a level on the INSPIRE and she said it was too high for him and he dropped it back down. He does not think he can go up any higher. I told her to go ahead and have the ONPO done now and then we will see what those readings are. She was agreeable to this and will [place] [phone]. to have that delivered.

## 2023-04-21 ENCOUNTER — TELEPHONE (OUTPATIENT)
Dept: CARDIOLOGY CLINIC | Age: 68
End: 2023-04-21

## 2023-04-21 DIAGNOSIS — G47.33 OBSTRUCTIVE SLEEP APNEA SYNDROME: Primary | ICD-10-CM

## 2023-04-21 DIAGNOSIS — G47.10 HYPERSOMNIA: ICD-10-CM

## 2023-04-21 DIAGNOSIS — J42 CHRONIC BRONCHITIS, UNSPECIFIED CHRONIC BRONCHITIS TYPE (HCC): ICD-10-CM

## 2023-04-21 NOTE — TELEPHONE ENCOUNTER
Pts wife calling to ask if fax can be sent again. Verified with [employer] that is was not received and faxed again.  Wife notified

## 2023-04-21 NOTE — TELEPHONE ENCOUNTER
DJ called to request the recent Lipid Panel for the Praluent 75mg/ml soaj injection pen. Please fax to:  [phone]. [facility] Please advise.   Thank you

## 2023-04-21 NOTE — PROGRESS NOTES
[facility] said they never got ONPO order that was faxed to them. New order placed and faxed.

## 2023-05-01 RX ORDER — DULAGLUTIDE 4.5 MG/.5ML
4.5 INJECTION, SOLUTION SUBCUTANEOUS WEEKLY
Qty: 4 ADJUSTABLE DOSE PRE-FILLED PEN SYRINGE | Refills: 3 | Status: SHIPPED | OUTPATIENT
Start: 2023-05-01

## 2023-05-01 NOTE — TELEPHONE ENCOUNTER
Pt calling requesting refill of Dulaglutide (TRULICITY) 4.5 ZB/6.4NC SOPN    Last written 2/17/23  Last OV 3/31/23  Next OV 7/11/23    Please send to [facility] on [address]

## 2023-05-02 ENCOUNTER — TELEPHONE (OUTPATIENT)
Dept: CARDIOLOGY CLINIC | Age: 68
End: 2023-05-02

## 2023-05-02 ENCOUNTER — OFFICE VISIT (OUTPATIENT)
Dept: PULMONOLOGY | Age: 68
End: 2023-05-02
Payer: MEDICARE

## 2023-05-02 DIAGNOSIS — G47.10 HYPERSOMNIA: ICD-10-CM

## 2023-05-02 DIAGNOSIS — G47.34 NOCTURNAL HYPOXIA: ICD-10-CM

## 2023-05-02 DIAGNOSIS — G47.33 OBSTRUCTIVE SLEEP APNEA SYNDROME: Primary | ICD-10-CM

## 2023-05-02 PROCEDURE — 1036F TOBACCO NON-USER: CPT | Performed by: INTERNAL MEDICINE

## 2023-05-02 PROCEDURE — G8417 CALC BMI ABV UP PARAM F/U: HCPCS | Performed by: INTERNAL MEDICINE

## 2023-05-02 PROCEDURE — 3017F COLORECTAL CA SCREEN DOC REV: CPT | Performed by: INTERNAL MEDICINE

## 2023-05-02 PROCEDURE — 1123F ACP DISCUSS/DSCN MKR DOCD: CPT | Performed by: INTERNAL MEDICINE

## 2023-05-02 PROCEDURE — G8427 DOCREV CUR MEDS BY ELIG CLIN: HCPCS | Performed by: INTERNAL MEDICINE

## 2023-05-02 PROCEDURE — 99215 OFFICE O/P EST HI 40 MIN: CPT | Performed by: INTERNAL MEDICINE

## 2023-05-02 PROCEDURE — 95976 ALYS SMPL CN NPGT PRGRMG: CPT | Performed by: INTERNAL MEDICINE

## 2023-05-02 ASSESSMENT — ENCOUNTER SYMPTOMS
EYES NEGATIVE: 1
RESPIRATORY NEGATIVE: 1
ALLERGIC/IMMUNOLOGIC NEGATIVE: 1
GASTROINTESTINAL NEGATIVE: 1

## 2023-05-02 NOTE — PROGRESS NOTES
MA Communication: The following orders are received by verbal communication from Madina Rodriguez MD    Orders include:  Needs Nocturnal O2 (pts wife to call tomorrow with name of company they want to use)       2 mo fu scheduled 7/13/23.
EPAP   Additional Services    [] Annual PRN service and check of equipment  [] Routine service and check of equipment  [] Download and report compliance data   Tidal volume      Tigger                                     Rate                                         Inspiratory time                                                         The following equipment is Medically Necessary for the above stated patient. It is reasonable and necessary in reference to acceptable standards of medical practice for this condition, and is not prescribed as a convenience. Frequency of Use:    Daily                 Length of Need: 13 Months              o The patient requires BiLevel PAP and the following apply: []  The patient requires a Respiratory Assist Device (RAD) and the following apply:   o CPAP was tried and failed to meet therapeutic goals. [] CPAP was tried, but failed to meet therapeutic goals   o The prescribed mask interface has been properly fit, is the most comfortable to the patient and will be used with the BPAP device. [] The prescribed mask interface has been properly fit, is the most comfortable to the patient and will be used with the RAD.   o Current CPAP setting prevents patient from tolerating the therapy and lower CPAP settings fail to adequately control the symptoms of MARCELLUS, improve sleep quality, or reduce AHI to acceptable levels. [] Current CPAP setting prevent patient from tolerating the therapy and lower PAP settings fail to  adequately control MARCELLUS symptoms, improve sleep quality, or reduce AHI to acceptable levels.          [] There is significant improvement of the respiratory events on the RAD                                                                                                                                                                                                                                  Deion Lloyd MD               NPI- [id number]     [facility]- [phone]

## 2023-05-02 NOTE — TELEPHONE ENCOUNTER
Pt wife Gurwinder James call stating the receive the praluent rx and they was understanding that they were getting rx for the repatha, they want to make sure that the Praluent is the correct medication.     Pls advise thank you     Gus Terrell   [phone]

## 2023-05-02 NOTE — PATIENT INSTRUCTIONS
ASSESSMENT/PLAN:   Diagnosis Orders   1. Obstructive sleep apnea syndrome        2. Hypersomnia        3. Nocturnal hypoxia            Sleep study to qualify for inspire    BMI at the time of of implantation was 31      Drug-induced sleep endoscopy  Date of Procedure: 09/13/22  Time: 0715     Pre Operative Diagnoses: Obstructive Sleep Apnea  Post Operative Diagnoses:  Obstructive Sleep Apnea           Procedure:  1. Drug Induced Sleep endoscopy (97371)       Surgeon: Alan Recio DO  A mild lateral wall component was noted, but the palatal collapse was primarily an anterior posterior fashion. More distally, mild lateral oropharyngeal wall component was noted, but again no complete lateral oropharyngeal collapse. 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 In summary, there is no evidence of complete concentric palatal obstruction and does appear to be a candidate anatomically for hypoglossal nerve stimulation therapy. Inspire implantation  Date of Operation:   10/24/22       Post-operative Diagnosis:   Obstructive sleep apnea     Operative Procedure:   Procedure(s):  INSPIRE DEVICE PLACEMENT (N/A)      Attending Surgeon:   Alan Recio DO      Patient has come in for sleep activation of inspire on 12/8/2022  Neurostimulator Reprogramming  Approximately 20 minutes was spent analyzing the airway and programming the device. Sensing voltage:  0.5 volts  Starting amplitude: 1.0 volts with the default [+ - +] electrode configuration. Starting Range:  Lower Limit: 1.0 volts  Upper Limit: 2.0 volts            Rate: Freq 33 hz  And 90 pulse width  Amplitude 1.0    Start delay 30minutes  Pause delay 15minutes  Duration 8hours      Continue to increase the level up by 1 level point every 7 days, adjust to comfort, if it feels uncomfortable drop it back by 1 level.       Inspire follow-up on 1/19/2023  Neurostimulator

## 2023-05-03 ENCOUNTER — TELEPHONE (OUTPATIENT)
Dept: PULMONOLOGY | Age: 68
End: 2023-05-03

## 2023-05-03 DIAGNOSIS — G47.34 NOCTURNAL HYPOXIA: Primary | ICD-10-CM

## 2023-05-03 DIAGNOSIS — J44.9 COPD, MODERATE (HCC): ICD-10-CM

## 2023-05-03 DIAGNOSIS — G47.33 OBSTRUCTIVE SLEEP APNEA SYNDROME: ICD-10-CM

## 2023-05-03 NOTE — TELEPHONE ENCOUNTER
Called patient and left message. Praluent and repatha are in the same class of medications. Insurance companies tend to only cover one or the other. His insurance covers praluent. It is the same instructions for administration and essentially does the same thing, lower cholesterol.

## 2023-05-04 NOTE — TELEPHONE ENCOUNTER
Kisha Contreras called to say they are still working on where to do oxygen. She did ask me to send the orders to [facility] along with office notes and testing that if they decide to go with them, there won't be another delay in getting the records sent to them. [facility]

## 2023-05-05 ENCOUNTER — TELEPHONE (OUTPATIENT)
Dept: PULMONOLOGY | Age: 68
End: 2023-05-05

## 2023-05-05 NOTE — TELEPHONE ENCOUNTER
Sent a fax yesterday and to see if you received it and how long will it take for Dr. Reva Butt to get it taken care of. Please advise.

## 2023-05-05 NOTE — TELEPHONE ENCOUNTER
[facility] called back with a Pt representative on the line. Per Noel of [facility] if they do not get the signed paper by today then they will not be able to get the equipment to the Pt before the Pt leaves on his trip. Shane Lawrence was in office and able to review the chart & complete the document. Paperwork faxed to [facility] & confirmed. Will be scanned & attached to this encounter.

## 2023-05-23 RX ORDER — LOSARTAN POTASSIUM 50 MG/1
50 TABLET ORAL DAILY
Qty: 90 TABLET | Refills: 1 | OUTPATIENT
Start: 2023-05-23

## 2023-05-26 DIAGNOSIS — E11.9 TYPE 2 DIABETES MELLITUS WITHOUT COMPLICATION, WITHOUT LONG-TERM CURRENT USE OF INSULIN (HCC): ICD-10-CM

## 2023-05-30 RX ORDER — BLOOD SUGAR DIAGNOSTIC
STRIP MISCELLANEOUS
Qty: 100 STRIP | Refills: 1 | Status: SHIPPED | OUTPATIENT
Start: 2023-05-30

## 2023-06-19 RX ORDER — ROSUVASTATIN CALCIUM 20 MG/1
20 TABLET, COATED ORAL DAILY
Qty: 90 TABLET | Refills: 3 | OUTPATIENT
Start: 2023-06-19

## 2023-06-20 ENCOUNTER — TELEPHONE (OUTPATIENT)
Dept: INTERNAL MEDICINE CLINIC | Age: 68
End: 2023-06-20

## 2023-06-20 NOTE — TELEPHONE ENCOUNTER
Referral was placed to the [facility] last week/ was for Dr. Maria Elena Avina (per their request)  but he should be able to see anyone in the group I believe.

## 2023-06-20 NOTE — TELEPHONE ENCOUNTER
Left Voicemail for patient that referral is good for any Physician at the Urology group and to let the office know if any further assistance is needed.

## 2023-06-20 NOTE — TELEPHONE ENCOUNTER
----- Message from [facility] sent at 6/20/2023  9:06 AM EDT -----  Subject: Referral Request    Reason for referral request? Pt is requesting a referral to see a   Urologist Dr. Peter Gonzalez in [place]. Pt would like this referral to   remove Kindey stone/stint. Provider patient wants to be referred to(if known):     Provider Phone Number(if known): Additional Information for Provider?  Dr. Dali Farnsworth fax # [phone]  ---------------------------------------------------------------------------  --------------  [address] [employer]    [phone]; OK to leave message on voicemail  ---------------------------------------------------------------------------  --------------

## 2023-07-03 RX ORDER — DAPAGLIFLOZIN 10 MG/1
TABLET, FILM COATED ORAL
Qty: 90 TABLET | Refills: 3 | Status: SHIPPED | OUTPATIENT
Start: 2023-07-03

## 2023-07-05 RX ORDER — SODIUM CHLORIDE, SODIUM LACTATE, POTASSIUM CHLORIDE, CALCIUM CHLORIDE 600; 310; 30; 20 MG/100ML; MG/100ML; MG/100ML; MG/100ML
INJECTION, SOLUTION INTRAVENOUS CONTINUOUS
Status: CANCELLED | OUTPATIENT
Start: 2023-07-05

## 2023-07-05 NOTE — PROGRESS NOTES
Patient not reached. Preop instructions left on voice mail. Jfcydu__[phone]_____________    -Date_7/6/2023______time_1330______arrival___1200_________  -Nothing to eat or drink after midnight  -Responsible adult 25 or older to stay on site while you are here and drive you home and stay with you after  -Follow any instructions your doctors office has given you  -Bring a complete list of all your medications and supplements  -If you normally take the following medications in the morning please do so with a small    sip of water-heart,blood pressure,seizure,breathing or thyroid-avoid water pilll Do not take blood pressure medications ending in \"gerardo\" or \"pril\" the AM of surgery or the magdiel prior  -You may use your inhalers  -Take half of your normal dose of any long acting insulins the night before-do not take    any diabetic medications in the morning  -Follow your doctors instructions regarding blood thinners  -Any questions call your surgeons office            VISITOR POLICY(subject to change)    Current policy is 2 visitors per patient. No children. Masks at discretion of facility. Visiting hours are 8a-8p. Overnight visitors will be at the discretion of the nurse. All policies are subject to change.

## 2023-07-06 ENCOUNTER — APPOINTMENT (OUTPATIENT)
Dept: GENERAL RADIOLOGY | Age: 68
End: 2023-07-06
Attending: UROLOGY
Payer: MEDICARE

## 2023-07-06 ENCOUNTER — ANESTHESIA EVENT (OUTPATIENT)
Dept: OPERATING ROOM | Age: 68
End: 2023-07-06
Payer: MEDICARE

## 2023-07-06 ENCOUNTER — HOSPITAL ENCOUNTER (OUTPATIENT)
Age: 68
Setting detail: OUTPATIENT SURGERY
Discharge: HOME | End: 2023-07-06
Attending: UROLOGY | Admitting: UROLOGY
Payer: MEDICARE

## 2023-07-06 ENCOUNTER — ANESTHESIA (OUTPATIENT)
Dept: OPERATING ROOM | Age: 68
End: 2023-07-06
Payer: MEDICARE

## 2023-07-06 VITALS
BODY MASS INDEX: 28.47 KG/M2 | RESPIRATION RATE: 17 BRPM | HEART RATE: 89 BPM | HEIGHT: 75 IN | OXYGEN SATURATION: 95 % | SYSTOLIC BLOOD PRESSURE: 111 MMHG | DIASTOLIC BLOOD PRESSURE: 74 MMHG | WEIGHT: 229 LBS | TEMPERATURE: 97.2 F

## 2023-07-06 DIAGNOSIS — N20.0 CALCULUS OF KIDNEY: ICD-10-CM

## 2023-07-06 LAB
ANION GAP SERPL CALCULATED.3IONS-SCNC: 14 MMOL/L (ref 3–16)
BUN SERPL-MCNC: 13 MG/DL (ref 7–20)
CALCIUM SERPL-MCNC: 9.5 MG/DL (ref 8.3–10.6)
CHLORIDE SERPL-SCNC: 100 MMOL/L (ref 99–110)
CO2 SERPL-SCNC: 24 MMOL/L (ref 21–32)
CREAT SERPL-MCNC: 1 MG/DL (ref 0.8–1.3)
GFR SERPLBLD CREATININE-BSD FMLA CKD-EPI: >60 ML/MIN/{1.73_M2}
GLUCOSE BLD-MCNC: 101 MG/DL (ref 70–99)
GLUCOSE BLD-MCNC: 90 MG/DL (ref 70–99)
GLUCOSE SERPL-MCNC: 104 MG/DL (ref 70–99)
PERFORMED ON: ABNORMAL
PERFORMED ON: NORMAL
POTASSIUM SERPL-SCNC: 4.5 MMOL/L (ref 3.5–5.1)
SODIUM SERPL-SCNC: 138 MMOL/L (ref 136–145)

## 2023-07-06 PROCEDURE — 82365 CALCULUS SPECTROSCOPY: CPT

## 2023-07-06 PROCEDURE — 3600000004 HC SURGERY LEVEL 4 BASE: Performed by: UROLOGY

## 2023-07-06 PROCEDURE — 7100000001 HC PACU RECOVERY - ADDTL 15 MIN: Performed by: UROLOGY

## 2023-07-06 PROCEDURE — C1769 GUIDE WIRE: HCPCS | Performed by: UROLOGY

## 2023-07-06 PROCEDURE — 3700000001 HC ADD 15 MINUTES (ANESTHESIA): Performed by: UROLOGY

## 2023-07-06 PROCEDURE — 2500000003 HC RX 250 WO HCPCS: Performed by: NURSE ANESTHETIST, CERTIFIED REGISTERED

## 2023-07-06 PROCEDURE — 7100000000 HC PACU RECOVERY - FIRST 15 MIN: Performed by: UROLOGY

## 2023-07-06 PROCEDURE — 80048 BASIC METABOLIC PNL TOTAL CA: CPT

## 2023-07-06 PROCEDURE — 36415 COLL VENOUS BLD VENIPUNCTURE: CPT

## 2023-07-06 PROCEDURE — 3600000014 HC SURGERY LEVEL 4 ADDTL 15MIN: Performed by: UROLOGY

## 2023-07-06 PROCEDURE — 6360000002 HC RX W HCPCS: Performed by: UROLOGY

## 2023-07-06 PROCEDURE — 74420 UROGRAPHY RTRGR +-KUB: CPT

## 2023-07-06 PROCEDURE — 2720000010 HC SURG SUPPLY STERILE: Performed by: UROLOGY

## 2023-07-06 PROCEDURE — 2580000003 HC RX 258: Performed by: UROLOGY

## 2023-07-06 PROCEDURE — 2709999900 HC NON-CHARGEABLE SUPPLY: Performed by: UROLOGY

## 2023-07-06 PROCEDURE — C2617 STENT, NON-COR, TEM W/O DEL: HCPCS | Performed by: UROLOGY

## 2023-07-06 PROCEDURE — 7100000010 HC PHASE II RECOVERY - FIRST 15 MIN: Performed by: UROLOGY

## 2023-07-06 PROCEDURE — 6360000002 HC RX W HCPCS: Performed by: NURSE ANESTHETIST, CERTIFIED REGISTERED

## 2023-07-06 PROCEDURE — 3700000000 HC ANESTHESIA ATTENDED CARE: Performed by: UROLOGY

## 2023-07-06 PROCEDURE — 6360000004 HC RX CONTRAST MEDICATION: Performed by: UROLOGY

## 2023-07-06 PROCEDURE — 2580000003 HC RX 258: Performed by: ANESTHESIOLOGY

## 2023-07-06 PROCEDURE — 7100000011 HC PHASE II RECOVERY - ADDTL 15 MIN: Performed by: UROLOGY

## 2023-07-06 DEVICE — URETERAL STENT
Type: IMPLANTABLE DEVICE | Site: URETER | Status: FUNCTIONAL
Brand: PERCUFLEX™ PLUS

## 2023-07-06 RX ORDER — SODIUM CHLORIDE 9 MG/ML
INJECTION, SOLUTION INTRAVENOUS PRN
Status: DISCONTINUED | OUTPATIENT
Start: 2023-07-06 | End: 2023-07-06 | Stop reason: HOSPADM

## 2023-07-06 RX ORDER — OXYCODONE HYDROCHLORIDE 5 MG/1
5 TABLET ORAL
Status: DISCONTINUED | OUTPATIENT
Start: 2023-07-06 | End: 2023-07-06 | Stop reason: HOSPADM

## 2023-07-06 RX ORDER — HYDROCODONE BITARTRATE AND ACETAMINOPHEN 5; 325 MG/1; MG/1
1 TABLET ORAL EVERY 6 HOURS PRN
Qty: 20 TABLET | Refills: 0 | Status: SHIPPED | OUTPATIENT
Start: 2023-07-06 | End: 2023-07-11

## 2023-07-06 RX ORDER — SODIUM CHLORIDE 0.9 % (FLUSH) 0.9 %
5-40 SYRINGE (ML) INJECTION EVERY 12 HOURS SCHEDULED
Status: CANCELLED | OUTPATIENT
Start: 2023-07-06

## 2023-07-06 RX ORDER — PROPOFOL 10 MG/ML
INJECTION, EMULSION INTRAVENOUS PRN
Status: DISCONTINUED | OUTPATIENT
Start: 2023-07-06 | End: 2023-07-06 | Stop reason: SDUPTHER

## 2023-07-06 RX ORDER — SODIUM CHLORIDE 0.9 % (FLUSH) 0.9 %
5-40 SYRINGE (ML) INJECTION EVERY 12 HOURS SCHEDULED
Status: DISCONTINUED | OUTPATIENT
Start: 2023-07-06 | End: 2023-07-06 | Stop reason: HOSPADM

## 2023-07-06 RX ORDER — SODIUM CHLORIDE 9 MG/ML
INJECTION, SOLUTION INTRAVENOUS CONTINUOUS
Status: DISCONTINUED | OUTPATIENT
Start: 2023-07-06 | End: 2023-07-06 | Stop reason: HOSPADM

## 2023-07-06 RX ORDER — TAMSULOSIN HYDROCHLORIDE 0.4 MG/1
0.4 CAPSULE ORAL DAILY
Qty: 30 CAPSULE | Refills: 0 | Status: SHIPPED | OUTPATIENT
Start: 2023-07-06 | End: 2023-08-05

## 2023-07-06 RX ORDER — AMOXICILLIN 250 MG
1 CAPSULE ORAL 2 TIMES DAILY
Qty: 30 TABLET | Refills: 1 | Status: SHIPPED | OUTPATIENT
Start: 2023-07-06 | End: 2023-08-05

## 2023-07-06 RX ORDER — SODIUM CHLORIDE 0.9 % (FLUSH) 0.9 %
5-40 SYRINGE (ML) INJECTION PRN
Status: CANCELLED | OUTPATIENT
Start: 2023-07-06

## 2023-07-06 RX ORDER — GLYCOPYRROLATE 0.2 MG/ML
INJECTION INTRAMUSCULAR; INTRAVENOUS PRN
Status: DISCONTINUED | OUTPATIENT
Start: 2023-07-06 | End: 2023-07-06 | Stop reason: SDUPTHER

## 2023-07-06 RX ORDER — FENTANYL CITRATE 50 UG/ML
25 INJECTION, SOLUTION INTRAMUSCULAR; INTRAVENOUS EVERY 5 MIN PRN
Status: DISCONTINUED | OUTPATIENT
Start: 2023-07-06 | End: 2023-07-06 | Stop reason: HOSPADM

## 2023-07-06 RX ORDER — OXYBUTYNIN CHLORIDE 5 MG/1
5 TABLET ORAL 3 TIMES DAILY PRN
Qty: 21 TABLET | Refills: 0 | Status: SHIPPED | OUTPATIENT
Start: 2023-07-06 | End: 2023-07-13

## 2023-07-06 RX ORDER — CIPROFLOXACIN 2 MG/ML
400 INJECTION, SOLUTION INTRAVENOUS
Status: DISCONTINUED | OUTPATIENT
Start: 2023-07-06 | End: 2023-07-06 | Stop reason: RX

## 2023-07-06 RX ORDER — FAMOTIDINE 10 MG/ML
INJECTION, SOLUTION INTRAVENOUS PRN
Status: DISCONTINUED | OUTPATIENT
Start: 2023-07-06 | End: 2023-07-06 | Stop reason: SDUPTHER

## 2023-07-06 RX ORDER — ROCURONIUM BROMIDE 10 MG/ML
INJECTION, SOLUTION INTRAVENOUS PRN
Status: DISCONTINUED | OUTPATIENT
Start: 2023-07-06 | End: 2023-07-06 | Stop reason: SDUPTHER

## 2023-07-06 RX ORDER — LIDOCAINE HYDROCHLORIDE 10 MG/ML
0.5 INJECTION, SOLUTION EPIDURAL; INFILTRATION; INTRACAUDAL; PERINEURAL ONCE
Status: DISCONTINUED | OUTPATIENT
Start: 2023-07-06 | End: 2023-07-06 | Stop reason: HOSPADM

## 2023-07-06 RX ORDER — PHENAZOPYRIDINE HYDROCHLORIDE 100 MG/1
200 TABLET, FILM COATED ORAL 3 TIMES DAILY PRN
Qty: 9 TABLET | Refills: 0 | Status: SHIPPED | OUTPATIENT
Start: 2023-07-06 | End: 2023-07-09

## 2023-07-06 RX ORDER — LIDOCAINE HYDROCHLORIDE 10 MG/ML
1 INJECTION, SOLUTION EPIDURAL; INFILTRATION; INTRACAUDAL; PERINEURAL
Status: CANCELLED | OUTPATIENT
Start: 2023-07-06 | End: 2023-07-07

## 2023-07-06 RX ORDER — SODIUM CHLORIDE 9 MG/ML
INJECTION, SOLUTION INTRAVENOUS PRN
Status: CANCELLED | OUTPATIENT
Start: 2023-07-06

## 2023-07-06 RX ORDER — PROCHLORPERAZINE EDISYLATE 5 MG/ML
5 INJECTION INTRAMUSCULAR; INTRAVENOUS
Status: DISCONTINUED | OUTPATIENT
Start: 2023-07-06 | End: 2023-07-06 | Stop reason: HOSPADM

## 2023-07-06 RX ORDER — LIDOCAINE HYDROCHLORIDE 20 MG/ML
INJECTION, SOLUTION INFILTRATION; PERINEURAL PRN
Status: DISCONTINUED | OUTPATIENT
Start: 2023-07-06 | End: 2023-07-06 | Stop reason: SDUPTHER

## 2023-07-06 RX ORDER — ONDANSETRON 2 MG/ML
INJECTION INTRAMUSCULAR; INTRAVENOUS PRN
Status: DISCONTINUED | OUTPATIENT
Start: 2023-07-06 | End: 2023-07-06 | Stop reason: SDUPTHER

## 2023-07-06 RX ORDER — SODIUM CHLORIDE 0.9 % (FLUSH) 0.9 %
5-40 SYRINGE (ML) INJECTION PRN
Status: DISCONTINUED | OUTPATIENT
Start: 2023-07-06 | End: 2023-07-06 | Stop reason: HOSPADM

## 2023-07-06 RX ORDER — ONDANSETRON 2 MG/ML
4 INJECTION INTRAMUSCULAR; INTRAVENOUS
Status: DISCONTINUED | OUTPATIENT
Start: 2023-07-06 | End: 2023-07-06 | Stop reason: HOSPADM

## 2023-07-06 RX ORDER — MAGNESIUM HYDROXIDE 1200 MG/15ML
LIQUID ORAL
Status: COMPLETED | OUTPATIENT
Start: 2023-07-06 | End: 2023-07-06

## 2023-07-06 RX ORDER — LEVOFLOXACIN 5 MG/ML
500 INJECTION, SOLUTION INTRAVENOUS
Status: COMPLETED | OUTPATIENT
Start: 2023-07-06 | End: 2023-07-06

## 2023-07-06 RX ORDER — LABETALOL HYDROCHLORIDE 5 MG/ML
10 INJECTION, SOLUTION INTRAVENOUS
Status: DISCONTINUED | OUTPATIENT
Start: 2023-07-06 | End: 2023-07-06 | Stop reason: HOSPADM

## 2023-07-06 RX ORDER — HYDRALAZINE HYDROCHLORIDE 20 MG/ML
10 INJECTION INTRAMUSCULAR; INTRAVENOUS
Status: DISCONTINUED | OUTPATIENT
Start: 2023-07-06 | End: 2023-07-06 | Stop reason: HOSPADM

## 2023-07-06 RX ORDER — FENTANYL CITRATE 50 UG/ML
INJECTION, SOLUTION INTRAMUSCULAR; INTRAVENOUS PRN
Status: DISCONTINUED | OUTPATIENT
Start: 2023-07-06 | End: 2023-07-06 | Stop reason: SDUPTHER

## 2023-07-06 RX ORDER — HYDROMORPHONE HCL 110MG/55ML
0.5 PATIENT CONTROLLED ANALGESIA SYRINGE INTRAVENOUS EVERY 5 MIN PRN
Status: DISCONTINUED | OUTPATIENT
Start: 2023-07-06 | End: 2023-07-06 | Stop reason: HOSPADM

## 2023-07-06 RX ORDER — DEXAMETHASONE SODIUM PHOSPHATE 4 MG/ML
INJECTION, SOLUTION INTRA-ARTICULAR; INTRALESIONAL; INTRAMUSCULAR; INTRAVENOUS; SOFT TISSUE PRN
Status: DISCONTINUED | OUTPATIENT
Start: 2023-07-06 | End: 2023-07-06 | Stop reason: SDUPTHER

## 2023-07-06 RX ADMIN — LIDOCAINE HYDROCHLORIDE 100 MG: 20 INJECTION, SOLUTION INFILTRATION; PERINEURAL at 14:00

## 2023-07-06 RX ADMIN — SUGAMMADEX 200 MG: 100 INJECTION, SOLUTION INTRAVENOUS at 14:23

## 2023-07-06 RX ADMIN — PHENYLEPHRINE HYDROCHLORIDE 100 MCG: 10 INJECTION INTRAVENOUS at 14:14

## 2023-07-06 RX ADMIN — ONDANSETRON 4 MG: 2 INJECTION INTRAMUSCULAR; INTRAVENOUS at 14:10

## 2023-07-06 RX ADMIN — PROPOFOL 200 MG: 10 INJECTION, EMULSION INTRAVENOUS at 14:00

## 2023-07-06 RX ADMIN — ROCURONIUM BROMIDE 50 MG: 10 INJECTION, SOLUTION INTRAVENOUS at 14:00

## 2023-07-06 RX ADMIN — LEVOFLOXACIN 500 MG: 5 INJECTION, SOLUTION INTRAVENOUS at 13:45

## 2023-07-06 RX ADMIN — GLYCOPYRROLATE 0.2 MG: 0.2 INJECTION, SOLUTION INTRAMUSCULAR; INTRAVENOUS at 13:58

## 2023-07-06 RX ADMIN — FAMOTIDINE 20 MG: 10 INJECTION INTRAVENOUS at 13:54

## 2023-07-06 RX ADMIN — SODIUM CHLORIDE: 9 INJECTION, SOLUTION INTRAVENOUS at 12:38

## 2023-07-06 RX ADMIN — DEXAMETHASONE SODIUM PHOSPHATE 4 MG: 4 INJECTION, SOLUTION INTRAMUSCULAR; INTRAVENOUS at 14:05

## 2023-07-06 RX ADMIN — PHENYLEPHRINE HYDROCHLORIDE 100 MCG: 10 INJECTION INTRAVENOUS at 14:19

## 2023-07-06 RX ADMIN — FENTANYL CITRATE 50 MCG: 50 INJECTION, SOLUTION INTRAMUSCULAR; INTRAVENOUS at 14:00

## 2023-07-06 RX ADMIN — PHENYLEPHRINE HYDROCHLORIDE 100 MCG: 10 INJECTION INTRAVENOUS at 14:04

## 2023-07-06 RX ADMIN — PHENYLEPHRINE HYDROCHLORIDE 100 MCG: 10 INJECTION INTRAVENOUS at 14:09

## 2023-07-06 ASSESSMENT — ENCOUNTER SYMPTOMS: SHORTNESS OF BREATH: 0

## 2023-07-06 ASSESSMENT — COPD QUESTIONNAIRES: CAT_SEVERITY: MODERATE

## 2023-07-06 ASSESSMENT — PAIN - FUNCTIONAL ASSESSMENT: PAIN_FUNCTIONAL_ASSESSMENT: 0-10

## 2023-07-06 NOTE — OP NOTE
Urology Operative Report  [facility]    Provider: Allan Bennett MD MD Patient ID:  Admission Date: 2023 Name: Juan David Shaffer Date: 2023  MRN: [id number]   Patient Location: OR/NONE : 1955  Attending: Allan Bennett MD Date of Service: 2023  PCP: Eliazar Walton DO     Date of Operation: 2023    Preoperative Diagnosis: LEFT side distal ureteral stone    Postoperative Diagnosis: same    Procedure:    1. Cystoscopy  2. Left side ureteroscopy  3. Laser lithotripsy  4. Basket extraction of stone  5. Left side ureteral stent placement  6. Fluoroscopic imaging < 1 hr physician time    Surgeon:   Allan Bennett MD, MD    Anesthesia: {Op note anesthesia types:56918}    Indications: Red Bates is a 70 y.o. male who presents for the above named surgery. Informed consent was obtained and the risks, benefits, and details of the procedure were explained to the patient who elected to proceed. Details of Procedure: The patient was brought to the operating room and placed in the supine position on the operating room table. SCDs were placed on the lower extremities. Following induction of anesthesia the patient was positioned in a lithotomy position, all pressure points were padded, and the genitals were prepped and draped in the usual sterile fashion. A routine timeout was performed, confirming the patient, procedure, site, risk of fire, patient allergies and confirming that preoperative antibiotics had been administered prior to beginning. A 21 fr rigid cystoscope was advance via a normal appearing urethra into the bladder. The bladder was inspected and there were no suspicious lesions, stones or diverticula seen. Attention was turned to the left ureteral orifice and a 0.035 sensor wire was advance up to the renal pelvis under control of fluoroscopy.  A semi-rigid ureteroscope was passed alongside the safety wire while using a second sensor wire in a

## 2023-07-06 NOTE — ANESTHESIA PRE PROCEDURE
10/21/2022 10:25 AM    CO2 29 10/21/2022 10:25 AM    BUN 19 10/21/2022 10:25 AM    CREATININE 1.1 10/21/2022 10:25 AM    CREATININE 1.1 04/19/2021 12:00 AM    GFRAA >60 10/10/2022 03:35 PM    AGRATIO 1.7 10/29/2020 03:36 PM    LABGLOM 55 10/10/2022 03:35 PM    GLUCOSE 149 10/21/2022 10:25 AM    GLUCOSE 124 08/31/2022 09:47 AM    PROT 7.1 04/26/2023 10:36 AM    CALCIUM 10.0 10/21/2022 10:25 AM    BILITOT 0.5 04/26/2023 10:36 AM    ALKPHOS 93 04/26/2023 10:36 AM    AST 31 04/26/2023 10:36 AM    ALT 46 04/26/2023 10:36 AM       POC Tests: No results for input(s): POCGLU, POCNA, POCK, POCCL, POCBUN, POCHEMO, POCHCT in the last 72 hours. Coags: No results found for: PROTIME, INR, APTT    HCG (If Applicable): No results found for: PREGTESTUR, PREGSERUM, HCG, HCGQUANT     ABGs: No results found for: PHART, PO2ART, NUU7QHO, IIB7WOY, BEART, S7XGPHTB     Type & Screen (If Applicable):  No results found for: LABABO, LABRH    Drug/Infectious Status (If Applicable):  Lab Results   Component Value Date/Time    HEPCAB NEGATIVE 11/10/2022 10:08 AM       COVID-19 Screening (If Applicable): No results found for: COVID19        Anesthesia Evaluation  Patient summary reviewed and Nursing notes reviewed no history of anesthetic complications:   Airway: Mallampati: I  TM distance: >3 FB   Neck ROM: full  Mouth opening: > = 3 FB   Dental:    (+) upper dentures and lower dentures      Pulmonary:   (+) COPD: moderate,  sleep apnea:      (-) asthma and shortness of breath                           Cardiovascular:    (+) hypertension:, past MI:, CAD:, CABG/stent: no interval change, hyperlipidemia    (-)  angina          Echocardiogram reviewed               ROS comment: EF 55-60     Neuro/Psych:      (-) CVA           GI/Hepatic/Renal:   (+) GERD:,      (-) liver disease       Endo/Other:    (+) DiabetesType II DM, , .    (-) hypothyroidism               Abdominal:             Vascular:     - PVD.       Other Findings:           Anesthesia

## 2023-07-06 NOTE — PROGRESS NOTES
Pt to PACU from OR, very drowsy. VSS- on 6L simple mask satting high 90s. Sinus tach on monitor (). String attached to stent- taped to penis.  Will continue to monitor

## 2023-07-06 NOTE — DISCHARGE INSTRUCTIONS
Ureteral Stent Removal Procedure    You have a ureteral stent in place. There should be a string coming from the urethra and taped in place. On 7/13/23 follow these steps to remove your stent. Most people tolerate doing this at home very well. If there is any concerns or issues please call your urologist office. After you have removed your stent please call your urologist office to inform them of successful stent removal. If you are uncomfortable removing your stent, call the office, and they will do it for you there. It will likely feel like your are urinating. It very rarely hurts to remove. The stent will look like a wet spaghetti noodle attached to black thred. 1. Wash your hands. 2. Undo the tape holding the thin black string. (usually found on one of your legs)  3. In one continuous gentle motion, pull down and away from your body while holding the string. 4. You will see a long thin (usually blue) tube attached to the string you have pulled from your urethra. 5. Throw the stent and string away in the general trash. 6. Call your urologist at [phone] to inform him of successful removal of the stent. 7/6/2023         Follow up with Dr. Yana Steinberg with any questions, concerns, results, and an appointment after your procedure in the time period recommended. [facility] INSTRUCTIONS    Follow your surgeons instructions. Your urine may look pink or red and it may burn when you urinate. You may also feel like you have to urinate often. Call your surgeon if your temperature is higher than 101 degrees, your urine is grossly bloody, or has large clots. Drink plenty of fluids unless your physician advises against it. If you can not urinate once you are home, call your surgeon or go to the Emergency Room. Take medications as directed. Take pain medication with food. Do not drive or operate machinery while taking narcotics.   Call your surgeon for any problems or questions      SEDATION

## 2023-07-06 NOTE — ANESTHESIA POSTPROCEDURE EVALUATION
Department of Anesthesiology  Postprocedure Note    Patient: Swapnil Cohen MRN: [id number]  YOB: 1955  Date of evaluation: 7/6/2023      Procedure Summary     Date: 07/06/23 Room / Location: [address]    Anesthesia Start: 0742 Anesthesia Stop: 7403    Procedure: CYSTOSCOPY WITH LEFT STENT REMOVAL WITH LEFT URETEROSCOPY WITH HOLMIUM LASER LITHOTRIPSY STONE MANIPULATION STONE BASKET POSSIBLE LEFT STENT PLACEMENT-[facility] #[id number] (Left: Ureter) Diagnosis:       Calculus of kidney      (Calculus of kidney [N20.0])    Surgeons: Mumtaz Turk MD Responsible Provider: Saturnino Abdi MD    Anesthesia Type: general ASA Status: 3          Anesthesia Type: No value filed.     Houston Phase I: Houston Score: 8    Houston Phase II:        Anesthesia Post Evaluation    Patient location during evaluation: PACU  Patient participation: complete - patient participated  Level of consciousness: awake  Airway patency: patent  Nausea & Vomiting: no vomiting and no nausea  Complications: no  Cardiovascular status: hemodynamically stable  Respiratory status: acceptable  Hydration status: stable  Multimodal analgesia pain management approach

## 2023-07-06 NOTE — PROGRESS NOTES
Discussed discharge paperwork, post op expectations, when to remove string and 5 new medication prescriptions with pt and pts wife- both verbalized understanding. Pts wife assisted pt in safely getting dressed and IV was removed with no complications. Pt was wheeled to front lobby to be taken home by wife.  All questions answered at this time and phone number for Dr. Jessi Hong office provided

## 2023-07-06 NOTE — PROGRESS NOTES
Pt awake resting in bed, VSS- on RA satting low to mid 90s. RR easy. String attached to stent intact. Pt denies pain and nausea- tolerating pepsi.  Phase one criteria met, will transition to phase two for discharge

## 2023-07-10 ENCOUNTER — TELEPHONE (OUTPATIENT)
Dept: PULMONOLOGY | Age: 68
End: 2023-07-10

## 2023-07-10 LAB
APPEARANCE STONE: NORMAL
COMPN STONE: NORMAL
SPECIMEN WT: 45 MG

## 2023-07-10 NOTE — TELEPHONE ENCOUNTER
I called the patient and spoke with him and he stated he has not spoken to anyone in our office today and he is not having a problem with his Heart Rate

## 2023-07-10 NOTE — TELEPHONE ENCOUNTER
Pt had appt this afternoon to go over his sleep study, however, his transportation did not show up. He was rescheduled for 4/12/23.     Pt is concerned about his heart rate going up to 184 bpm.  He said he has heart issues and that is abnormal.  Pt is wondering if he should be contacting his cardiologist.

## 2023-07-11 ENCOUNTER — OFFICE VISIT (OUTPATIENT)
Dept: INTERNAL MEDICINE CLINIC | Age: 68
End: 2023-07-11
Payer: MEDICARE

## 2023-07-11 VITALS
OXYGEN SATURATION: 95 % | HEIGHT: 75 IN | SYSTOLIC BLOOD PRESSURE: 122 MMHG | BODY MASS INDEX: 28.62 KG/M2 | HEART RATE: 71 BPM | DIASTOLIC BLOOD PRESSURE: 66 MMHG

## 2023-07-11 DIAGNOSIS — R41.89 COGNITIVE IMPAIRMENT: ICD-10-CM

## 2023-07-11 DIAGNOSIS — E87.5 HYPERKALEMIA: ICD-10-CM

## 2023-07-11 DIAGNOSIS — E11.29 TYPE 2 DIABETES MELLITUS WITH MICROALBUMINURIA, WITHOUT LONG-TERM CURRENT USE OF INSULIN (HCC): ICD-10-CM

## 2023-07-11 DIAGNOSIS — I25.2 HISTORY OF MYOCARDIAL INFARCTION: ICD-10-CM

## 2023-07-11 DIAGNOSIS — R80.9 TYPE 2 DIABETES MELLITUS WITH MICROALBUMINURIA, WITHOUT LONG-TERM CURRENT USE OF INSULIN (HCC): ICD-10-CM

## 2023-07-11 DIAGNOSIS — J44.9 COPD, MODERATE (HCC): ICD-10-CM

## 2023-07-11 DIAGNOSIS — I10 ESSENTIAL HYPERTENSION: Chronic | ICD-10-CM

## 2023-07-11 DIAGNOSIS — E78.5 HYPERLIPIDEMIA LDL GOAL <70: ICD-10-CM

## 2023-07-11 DIAGNOSIS — Z86.19 HISTORY OF SEPSIS: Primary | ICD-10-CM

## 2023-07-11 DIAGNOSIS — G47.33 OBSTRUCTIVE SLEEP APNEA SYNDROME: ICD-10-CM

## 2023-07-11 DIAGNOSIS — Z87.442 HISTORY OF KIDNEY STONES: ICD-10-CM

## 2023-07-11 DIAGNOSIS — Z86.010 HISTORY OF COLON POLYPS: ICD-10-CM

## 2023-07-11 PROCEDURE — 2022F DILAT RTA XM EVC RTNOPTHY: CPT | Performed by: INTERNAL MEDICINE

## 2023-07-11 PROCEDURE — 3023F SPIROM DOC REV: CPT | Performed by: INTERNAL MEDICINE

## 2023-07-11 PROCEDURE — G8417 CALC BMI ABV UP PARAM F/U: HCPCS | Performed by: INTERNAL MEDICINE

## 2023-07-11 PROCEDURE — 1036F TOBACCO NON-USER: CPT | Performed by: INTERNAL MEDICINE

## 2023-07-11 PROCEDURE — 1123F ACP DISCUSS/DSCN MKR DOCD: CPT | Performed by: INTERNAL MEDICINE

## 2023-07-11 PROCEDURE — 3078F DIAST BP <80 MM HG: CPT | Performed by: INTERNAL MEDICINE

## 2023-07-11 PROCEDURE — 3074F SYST BP LT 130 MM HG: CPT | Performed by: INTERNAL MEDICINE

## 2023-07-11 PROCEDURE — 99215 OFFICE O/P EST HI 40 MIN: CPT | Performed by: INTERNAL MEDICINE

## 2023-07-11 PROCEDURE — 3017F COLORECTAL CA SCREEN DOC REV: CPT | Performed by: INTERNAL MEDICINE

## 2023-07-11 PROCEDURE — 3046F HEMOGLOBIN A1C LEVEL >9.0%: CPT | Performed by: INTERNAL MEDICINE

## 2023-07-11 PROCEDURE — G8427 DOCREV CUR MEDS BY ELIG CLIN: HCPCS | Performed by: INTERNAL MEDICINE

## 2023-07-11 RX ORDER — INCLISIRAN 284 MG/1.5ML
284 INJECTION, SOLUTION SUBCUTANEOUS ONCE
Qty: 1.5 ML | Refills: 0 | Status: SHIPPED | OUTPATIENT
Start: 2023-07-11 | End: 2023-07-11

## 2023-07-11 ASSESSMENT — ENCOUNTER SYMPTOMS
CONSTIPATION: 0
SHORTNESS OF BREATH: 0
CHEST TIGHTNESS: 0
DIARRHEA: 0

## 2023-07-11 NOTE — PATIENT INSTRUCTIONS
[facility] Pulmonary, Sleep and [address], [place]   [address] [facility], [address]   [facility], [address]   Phone: [phone]

## 2023-07-11 NOTE — PROGRESS NOTES
hemoglobin A1c down to 6.3%. Continue Trulicity 4.5 mg weekly, metformin ER 1000 mg twice daily, Farxiga 10 mg daily, and glipizide ER 5 mg daily. Consider discontinue glipizide. Relevant Orders    Hemoglobin A1C    Comprehensive Metabolic Panel     DIABETES FOOT EXAM (Completed)       Current Outpatient Medications   Medication Sig Dispense Refill    Inclisiran Sodium (LEQVIO) 284 MG/1.5ML Inject 1.5 mLs into the skin once for 1 dose 1.5 mL 0    HYDROcodone-acetaminophen (NORCO) 5-325 MG per tablet Take 1 tablet by mouth every 6 hours as needed for Pain for up to 5 days. Intended supply: 5 days. Take lowest dose possible to manage pain. Max Daily Amount: 4 tablets 20 tablet 0    oxybutynin (DITROPAN) 5 MG tablet Take 1 tablet by mouth 3 times daily as needed (bladder spasms) 21 tablet 0    senna-docusate (PERICOLACE) 8.6-50 MG per tablet Take 1 tablet by mouth 2 times daily For constipation while on pain medication.  30 tablet 1    FARXIGA 10 MG tablet TAKE 1 TABLET BY MOUTH EVERY MORNING 90 tablet 3    ACCU-CHEK GUIDE strip TEST TWICE DAILY AND AS NEEDED FOR SYMPTOMS OF IRREGULAR BLOOD GLUCOSE 100 strip 1    Dulaglutide (TRULICITY) 4.5 ED/4.4KG SOPN Inject 4.5 mg into the skin once a week 4 Adjustable Dose Pre-filled Pen Syringe 3    losartan (COZAAR) 25 MG tablet Take 1 tablet by mouth daily 90 tablet 3    dutasteride (AVODART) 0.5 MG capsule Take 1 capsule by mouth daily 30 capsule 3    albuterol sulfate HFA (VENTOLIN HFA) 108 (90 Base) MCG/ACT inhaler Inhale 2 puffs into the lungs 4 times daily as needed for Wheezing 18 g 0    ranolazine (RANEXA) 500 MG extended release tablet TAKE 1 TABLET BY MOUTH TWICE DAILY 180 tablet 1    ondansetron (ZOFRAN) 4 MG tablet Take 1 tablet by mouth every 8 hours as needed for Nausea or Vomiting 15 tablet 1    glipiZIDE (GLUCOTROL XL) 5 MG extended release tablet TAKE 1 TABLET BY MOUTH EVERY DAY IN THE MORNING 90 tablet 3    carvedilol (COREG) 6.25 MG tablet Take 1

## 2023-07-12 LAB
POTASSIUM SERPL-SCNC: 4.8 MMOL/L (ref 3.5–5.1)
TSH SERPL DL<=0.005 MIU/L-ACNC: 0.43 UIU/ML (ref 0.27–4.2)
URATE SERPL-MCNC: 4.9 MG/DL (ref 3.5–7.2)

## 2023-07-12 NOTE — ASSESSMENT & PLAN NOTE
Patient was admitted to [facility] in [place], [place] in early June with kidney stone and secondary sepsis. Had stent placed during admission with kidney stone retrieval as outpatient upon returning to [place]. Kidney stone retrieval happened last week with Dr. Dandre Cary. Had never stent placed which will be removed next week. Check uric acid level.

## 2023-07-12 NOTE — ASSESSMENT & PLAN NOTE
Confirmed with PFTs. Has been referred to Dr. Jarred Goldman but has not yet scheduled an appointment. Does have a 35-pack-year smoking history, having quit in 2020. Now on nocturnal oxygen.

## 2023-07-12 NOTE — ASSESSMENT & PLAN NOTE
Did not tolerate Praulent (caused flu-like symptoms). We will try to get patient qualified for [facility]. Possible cognitive impairment with Crestor. Remain off of Crestor.

## 2023-07-12 NOTE — ASSESSMENT & PLAN NOTE
Patient was admitted to [facility] in [place], [place] in early June with kidney stone and secondary sepsis. Had stent placed during admission with kidney stone retrieval as outpatient upon returning to [place]. Kidney stone retrieval happened last week with Dr. Al Thompson. Had never stent placed which will be removed next week.

## 2023-07-12 NOTE — ASSESSMENT & PLAN NOTE
Well-controlled with hemoglobin A1c down to 6.3%. Continue Trulicity 4.5 mg weekly, metformin ER 1000 mg twice daily, Farxiga 10 mg daily, and glipizide ER 5 mg daily. Consider discontinue glipizide.

## 2023-07-13 ENCOUNTER — OFFICE VISIT (OUTPATIENT)
Dept: PULMONOLOGY | Age: 68
End: 2023-07-13
Payer: MEDICARE

## 2023-07-13 DIAGNOSIS — G47.33 OBSTRUCTIVE SLEEP APNEA SYNDROME: Primary | ICD-10-CM

## 2023-07-13 DIAGNOSIS — Z96.82 S/P INSERTION OF HYPOGLOSSAL NERVE STIMULATOR: ICD-10-CM

## 2023-07-13 PROCEDURE — G8417 CALC BMI ABV UP PARAM F/U: HCPCS | Performed by: INTERNAL MEDICINE

## 2023-07-13 PROCEDURE — 3017F COLORECTAL CA SCREEN DOC REV: CPT | Performed by: INTERNAL MEDICINE

## 2023-07-13 PROCEDURE — 1123F ACP DISCUSS/DSCN MKR DOCD: CPT | Performed by: INTERNAL MEDICINE

## 2023-07-13 PROCEDURE — 99215 OFFICE O/P EST HI 40 MIN: CPT | Performed by: INTERNAL MEDICINE

## 2023-07-13 PROCEDURE — 1036F TOBACCO NON-USER: CPT | Performed by: INTERNAL MEDICINE

## 2023-07-13 PROCEDURE — 95976 ALYS SMPL CN NPGT PRGRMG: CPT | Performed by: INTERNAL MEDICINE

## 2023-07-13 PROCEDURE — G8427 DOCREV CUR MEDS BY ELIG CLIN: HCPCS | Performed by: INTERNAL MEDICINE

## 2023-07-13 ASSESSMENT — ENCOUNTER SYMPTOMS
GASTROINTESTINAL NEGATIVE: 1
ALLERGIC/IMMUNOLOGIC NEGATIVE: 1
RESPIRATORY NEGATIVE: 1
EYES NEGATIVE: 1

## 2023-07-13 NOTE — PATIENT INSTRUCTIONS
delay  30 minutes  Pause delay 15 minutes  Duration 10  hours      Time usage: 59 Hours per week      Outgoing Control Range:  Keep trying to go up to the level 4 at 2.2, if tolerate    Will start on oxygen at 2 lpm at night        Follow-up on 7/13/2023    Neurostimulator Reprogramming  Approximately 20 minutes was spent analyzing the airway and programming the device. Incoming Amplitude: 2.1volts with the default [+ - +] electrode configuration.   Incoming Control Range:  Lower Limit: 1.9 volts  Upper Limit: 2.5 volts         Rate: Freq 33 hz  And 90 pulse width  Amplitude 2.1    Start delay  30 minutes  Pause delay 15 minutes  Duration 8  hours      Time usage: 63 Hours per week      Outgoing Control Range:  No issues over the last several months  Has had some issues with kidney stone and sepsis secondary to this but otherwise doing well  No chest pains or palpitations  Some shortness of breath  Just recently diagnosed with COPD      Return to clinic in [place] in October  We will get referral for COPD  Continue with albuterol

## 2023-07-13 NOTE — PROGRESS NOTES
Artlena Padilla (: 1955 ) is a 70 y.o. male here for an evaluation of No chief complaint on file. ASSESSMENT/PLAN:   Diagnosis Orders   1. Obstructive sleep apnea syndrome        2. S/P insertion of hypoglossal nerve stimulator            Sleep study to qualify for inspire    BMI at the time of of implantation was 31      Drug-induced sleep endoscopy  Date of Procedure: 22  Time: 0715     Pre Operative Diagnoses: Obstructive Sleep Apnea  Post Operative Diagnoses:  Obstructive Sleep Apnea           Procedure:  1. Drug Induced Sleep endoscopy (11132)       Surgeon: Nancy Rae DO  A mild lateral wall component was noted, but the palatal collapse was primarily an anterior posterior fashion. More distally, mild lateral oropharyngeal wall component was noted, but again no complete lateral oropharyngeal collapse. 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 In summary, there is no evidence of complete concentric palatal obstruction and does appear to be a candidate anatomically for hypoglossal nerve stimulation therapy. Inspire implantation  Date of Operation:   10/24/22       Post-operative Diagnosis:   Obstructive sleep apnea     Operative Procedure:   Procedure(s):  INSPIRE DEVICE PLACEMENT (N/A)      Attending Surgeon:   Nancy Rae DO      Patient has come in for sleep activation of inspire on 2022  Neurostimulator Reprogramming  Approximately 20 minutes was spent analyzing the airway and programming the device. Sensing voltage:  0.5 volts  Starting amplitude: 1.0 volts with the default [+ - +] electrode configuration.   Starting Range:  Lower Limit: 1.0 volts  Upper Limit: 2.0 volts            Rate: Freq 33 hz  And 90 pulse width  Amplitude 1.0    Start delay 30minutes  Pause delay 15minutes  Duration 8hours      Continue to increase the level up by 1 level point every 7 days,

## 2023-07-13 NOTE — PROGRESS NOTES
MA Communication:   The following orders are received by verbal communication from Lenin Rahman MD    Orders include:  [facility]       1 yr INSPIRE [facility] 6[facility]24/24

## 2023-07-14 DIAGNOSIS — J44.9 CHRONIC OBSTRUCTIVE PULMONARY DISEASE, UNSPECIFIED COPD TYPE (HCC): Primary | ICD-10-CM

## 2023-07-14 NOTE — TELEPHONE ENCOUNTER
Script for O2 tubing was placed yesterday, however it did not go to pharmacy. Please sign script again. It will be printed and we will have to fax it to [phone] once signed.

## 2023-07-18 RX ORDER — RANOLAZINE 500 MG/1
TABLET, EXTENDED RELEASE ORAL
Qty: 180 TABLET | Refills: 1 | Status: SHIPPED | OUTPATIENT
Start: 2023-07-18

## 2023-07-20 ENCOUNTER — TELEPHONE (OUTPATIENT)
Dept: INTERNAL MEDICINE CLINIC | Age: 68
End: 2023-07-20

## 2023-07-20 DIAGNOSIS — R41.89 COGNITIVE IMPAIRMENT: Primary | ICD-10-CM

## 2023-07-20 DIAGNOSIS — J44.9 COPD, MODERATE (HCC): ICD-10-CM

## 2023-07-20 NOTE — TELEPHONE ENCOUNTER
----- Message from Victor M Culp sent at 7/20/2023  2:42 PM EDT -----  Subject: Referral Request    Reason for referral request? Patient's wife called in Patient needs   referral to Dr. Abbey Moreno. Fax: [phone]. For COPD And to Dr. Neisha Espinosa VFD:[phone] Julia Pichardo for cognitive testing   Provider patient wants to be referred to(if known):     Provider Phone Number(if known):     Additional Information for Provider?   ---------------------------------------------------------------------------  --------------  [address]    [phone]; OK to leave message on voicemail  ---------------------------------------------------------------------------  --------------

## 2023-07-21 ENCOUNTER — TELEPHONE (OUTPATIENT)
Dept: CARDIOLOGY CLINIC | Age: 68
End: 2023-07-21

## 2023-07-21 NOTE — TELEPHONE ENCOUNTER
Leonard Aguila called to inform the office that the prescription was closed out by Eldred Shone. Leonard Aguila is wanting to know if the Pt is to be Praluent. If so please send in a new script. Please advise.   Thank you

## 2023-07-24 RX ORDER — METFORMIN HYDROCHLORIDE 750 MG/1
TABLET, EXTENDED RELEASE ORAL
Qty: 180 TABLET | Refills: 3 | Status: SHIPPED | OUTPATIENT
Start: 2023-07-24

## 2023-07-25 ENCOUNTER — CLINICAL DOCUMENTATION (OUTPATIENT)
Dept: ONCOLOGY | Age: 68
End: 2023-07-25

## 2023-07-26 ENCOUNTER — TELEPHONE (OUTPATIENT)
Dept: CARDIOLOGY CLINIC | Age: 68
End: 2023-07-26

## 2023-07-26 NOTE — TELEPHONE ENCOUNTER
CARDIAC CLEARANCE     What type of procedure are you having? Which physician is performing your procedure? Dr Alina Kumari    When is your procedure scheduled for? Not scheduled yet     Where are you having this procedure? [facility]    Are you taking Blood Thinners? Baby aspirin   If so what? (Name/dose/frequesncy)     Does the surgeon want you to stop your blood thinner? NO If so for how long?      Phone Number and Contact Name for Physicians office: [phone]    Fax number to send information: [phone]

## 2023-07-26 NOTE — TELEPHONE ENCOUNTER
I called at [phone] Dr Antonia Ramon office, lmom to call us back to let us know what kind of surgery pt is having.

## 2023-08-01 ENCOUNTER — TELEPHONE (OUTPATIENT)
Dept: CARDIOLOGY CLINIC | Age: 68
End: 2023-08-01

## 2023-08-01 NOTE — TELEPHONE ENCOUNTER
Per DCE 4/6/23 note,  Instructions      Return in about 1 year (around 4/6/2024). Reduce losartan to 25mg  Start injectable cholesterol medication        Is pt supposed to take praluent or it has been discontinued?  Please advise  Thank you

## 2023-08-01 NOTE — TELEPHONE ENCOUNTER
[facility] is wanting to confirm that patients Praluent has been discontinued. Please call to advise.

## 2023-08-03 RX ORDER — DULAGLUTIDE 4.5 MG/.5ML
4.5 INJECTION, SOLUTION SUBCUTANEOUS WEEKLY
Qty: 12 ADJUSTABLE DOSE PRE-FILLED PEN SYRINGE | Refills: 1 | Status: SHIPPED | OUTPATIENT
Start: 2023-08-03

## 2023-08-03 NOTE — TELEPHONE ENCOUNTER
Pts wife called. Patient request Trulicity be sent into the pharmacy for a 3 mo supply. Script pended. Also the injection Ole Carbon is not covered under his insurance and costs out of pocket $3, 000. and patient would like to go back to Crestor 20mg. 90 days supply.

## 2023-08-04 DIAGNOSIS — E11.9 TYPE 2 DIABETES MELLITUS WITHOUT COMPLICATION, WITHOUT LONG-TERM CURRENT USE OF INSULIN (HCC): ICD-10-CM

## 2023-08-04 RX ORDER — BLOOD SUGAR DIAGNOSTIC
STRIP MISCELLANEOUS
Qty: 100 STRIP | Refills: 1 | Status: SHIPPED | OUTPATIENT
Start: 2023-08-04

## 2023-08-07 ENCOUNTER — TELEPHONE (OUTPATIENT)
Dept: INTERNAL MEDICINE CLINIC | Age: 68
End: 2023-08-07

## 2023-08-07 NOTE — TELEPHONE ENCOUNTER
----- Message from [address] sent at 8/7/2023 11:45 AM EDT -----  Subject: Refill Request    QUESTIONS  Name of Medication? Other - crestor 20mg   Patient-reported dosage and instructions? 1 tablet a day   How many days do you have left? 0  [id number]  Pharmacy phone number (if available)? [phone]  Additional Information for Provider? 90 day supply he never received a   re-fill on this and was wondering why   ---------------------------------------------------------------------------  --------------  CALL BACK INFO  What is the best way for the office to contact you? OK to leave message on   voicemail  Preferred Call Back Phone Number? [phone]  ---------------------------------------------------------------------------  --------------  SCRIPT ANSWERS  Relationship to Patient? Spouse/Partner  Representative Name? pSencer Mendez  Is the representative on the Communication Release of Information (CAROLINE)   form in Epic?  Yes

## 2023-08-07 NOTE — TELEPHONE ENCOUNTER
Last OV: 7/11/2023  Next OV: 12/1/2023    Looks like an RN not sure from where D/C medication on 10/19/2022 do you want patient to go back on medication? Patient has been informed that will have to wait for return of PCP, confirmed understanding.

## 2023-08-11 NOTE — TELEPHONE ENCOUNTER
He stopped Crestor as was concerned that he was worried it was causing cognitive problems. I am happy to refill if he would like to try again.

## 2023-08-14 RX ORDER — ROSUVASTATIN CALCIUM 20 MG/1
20 TABLET, COATED ORAL DAILY
Qty: 90 TABLET | Refills: 3 | OUTPATIENT
Start: 2023-08-14

## 2023-08-14 RX ORDER — ROSUVASTATIN CALCIUM 20 MG/1
20 TABLET, COATED ORAL DAILY
Qty: 90 TABLET | Refills: 3 | Status: SHIPPED | OUTPATIENT
Start: 2023-08-14

## 2023-09-20 ENCOUNTER — TELEPHONE (OUTPATIENT)
Dept: INTERNAL MEDICINE CLINIC | Age: 68
End: 2023-09-20

## 2023-09-20 DIAGNOSIS — R41.89 COGNITIVE IMPAIRMENT: Primary | ICD-10-CM

## 2023-09-20 NOTE — TELEPHONE ENCOUNTER
Left Voicemail for  to have evaluation notes sent over to PCP office for evaluation to determine treatment plan. Office number and fax provided in message. Also called spouse back to let her know we are waiting on notes.

## 2023-09-20 NOTE — TELEPHONE ENCOUNTER
Pt's wife called in regards to following up on a request from Dr. Jonah Fritz. She states that the pt was seen and the doctor wanted to discuss a medication with Dr. Leo Diop. I don't see an encounter in his chart. Their office number is [phone].  Please advise and call pt's wife back at [phone]

## 2023-09-20 NOTE — TELEPHONE ENCOUNTER
Dr. Barba Standard called regarding this patient. He stated that he was evaluated for cognitive concerns and he diagnosed him with dementia. He wanted to know how Dr. Patrice Martínez would like to go about treating the patient and if she wants him on certain medications. Dr. Barba Standard can be reached at [phone].

## 2023-09-20 NOTE — TELEPHONE ENCOUNTER
I have not received anything from his office. Can you please reach out and see if they will at least send me a copy of the note? Thanks.

## 2023-09-25 ENCOUNTER — OFFICE VISIT (OUTPATIENT)
Dept: INTERNAL MEDICINE CLINIC | Age: 68
End: 2023-09-25
Payer: MEDICARE

## 2023-09-25 VITALS
HEART RATE: 92 BPM | SYSTOLIC BLOOD PRESSURE: 128 MMHG | OXYGEN SATURATION: 92 % | WEIGHT: 238.6 LBS | BODY MASS INDEX: 29.82 KG/M2 | TEMPERATURE: 99.3 F | DIASTOLIC BLOOD PRESSURE: 78 MMHG

## 2023-09-25 DIAGNOSIS — J44.1 COPD EXACERBATION (HCC): ICD-10-CM

## 2023-09-25 DIAGNOSIS — J44.1 COPD EXACERBATION (HCC): Primary | ICD-10-CM

## 2023-09-25 LAB
BASOPHILS # BLD: 0 K/UL (ref 0–0.2)
BASOPHILS NFR BLD: 0.3 %
DEPRECATED RDW RBC AUTO: 14.3 % (ref 12.4–15.4)
EOSINOPHIL # BLD: 0.1 K/UL (ref 0–0.6)
EOSINOPHIL NFR BLD: 0.9 %
HCT VFR BLD AUTO: 38.3 % (ref 40.5–52.5)
HGB BLD-MCNC: 13.2 G/DL (ref 13.5–17.5)
INFLUENZA A ANTIBODY: NEGATIVE
INFLUENZA B ANTIBODY: NEGATIVE
LYMPHOCYTES # BLD: 1.6 K/UL (ref 1–5.1)
LYMPHOCYTES NFR BLD: 11.3 %
MCH RBC QN AUTO: 31.6 PG (ref 26–34)
MCHC RBC AUTO-ENTMCNC: 34.6 G/DL (ref 31–36)
MCV RBC AUTO: 91.4 FL (ref 80–100)
MONOCYTES # BLD: 1.2 K/UL (ref 0–1.3)
MONOCYTES NFR BLD: 8.8 %
NEUTROPHILS # BLD: 10.9 K/UL (ref 1.7–7.7)
NEUTROPHILS NFR BLD: 78.7 %
PLATELET # BLD AUTO: 222 K/UL (ref 135–450)
PMV BLD AUTO: 8.8 FL (ref 5–10.5)
RBC # BLD AUTO: 4.19 M/UL (ref 4.2–5.9)
WBC # BLD AUTO: 13.9 K/UL (ref 4–11)

## 2023-09-25 PROCEDURE — 3023F SPIROM DOC REV: CPT

## 2023-09-25 PROCEDURE — G8427 DOCREV CUR MEDS BY ELIG CLIN: HCPCS

## 2023-09-25 PROCEDURE — 99214 OFFICE O/P EST MOD 30 MIN: CPT

## 2023-09-25 PROCEDURE — 3017F COLORECTAL CA SCREEN DOC REV: CPT

## 2023-09-25 PROCEDURE — 1036F TOBACCO NON-USER: CPT

## 2023-09-25 PROCEDURE — 3078F DIAST BP <80 MM HG: CPT

## 2023-09-25 PROCEDURE — 3074F SYST BP LT 130 MM HG: CPT

## 2023-09-25 PROCEDURE — 1123F ACP DISCUSS/DSCN MKR DOCD: CPT

## 2023-09-25 PROCEDURE — 87804 INFLUENZA ASSAY W/OPTIC: CPT

## 2023-09-25 PROCEDURE — G8417 CALC BMI ABV UP PARAM F/U: HCPCS

## 2023-09-25 RX ORDER — PREDNISONE 20 MG/1
40 TABLET ORAL DAILY
Qty: 14 TABLET | Refills: 0 | Status: SHIPPED | OUTPATIENT
Start: 2023-09-25 | End: 2023-10-02

## 2023-09-25 RX ORDER — AZITHROMYCIN 250 MG/1
250 TABLET, FILM COATED ORAL SEE ADMIN INSTRUCTIONS
Qty: 6 TABLET | Refills: 0 | Status: SHIPPED | OUTPATIENT
Start: 2023-09-25 | End: 2023-09-30

## 2023-09-25 NOTE — PROGRESS NOTES
5 6 tablet 0    predniSONE (DELTASONE) 20 MG tablet Take 2 tablets by mouth daily for 7 days 14 tablet 0    rosuvastatin (CRESTOR) 20 MG tablet Take 1 tablet by mouth daily 90 tablet 3    ACCU-CHEK GUIDE strip TEST TWICE DAILY AND AS NEEDED FOR SYMPTOMS OF IRREGULAR BLOOD GLUCOSE 100 strip 1    Dulaglutide (TRULICITY) 4.5 UY/9.3TX SOPN Inject 4.5 mg into the skin once a week 12 Adjustable Dose Pre-filled Pen Syringe 1    metFORMIN (GLUCOPHAGE-XR) 750 MG extended release tablet TAKE 2 TABLETS BY MOUTH DAILY WITH BREAKFAST 180 tablet 3    ranolazine (RANEXA) 500 MG extended release tablet TAKE 1 TABLET BY MOUTH TWICE DAILY 180 tablet 1    Oxygen Tubing MISC by Does not apply route Nasal Cannula (Adult) TLCannula TM w/7' supply tubing to be used with O2 as needed for SOB 1 each 11    FARXIGA 10 MG tablet TAKE 1 TABLET BY MOUTH EVERY MORNING 90 tablet 3    losartan (COZAAR) 25 MG tablet Take 1 tablet by mouth daily 90 tablet 3    albuterol sulfate HFA (VENTOLIN HFA) 108 (90 Base) MCG/ACT inhaler Inhale 2 puffs into the lungs 4 times daily as needed for Wheezing 18 g 0    omeprazole (PRILOSEC) 20 MG delayed release capsule TAKE 1 CAPSULE BY MOUTH DAILY 90 capsule 3    glipiZIDE (GLUCOTROL XL) 5 MG extended release tablet TAKE 1 TABLET BY MOUTH EVERY DAY IN THE MORNING 90 tablet 3    carvedilol (COREG) 6.25 MG tablet Take 1 tablet by mouth 2 times daily 180 tablet 3    Accu-Chek FastClix Lancets MISC USE AS DIRECTED TWICE DAILY 102 each 5    aspirin 81 MG chewable tablet Take 1 tablet by mouth daily      oxybutynin (DITROPAN) 5 MG tablet Take 1 tablet by mouth 3 times daily as needed (bladder spasms) 21 tablet 0    tamsulosin (FLOMAX) 0.4 MG capsule Take 1 capsule by mouth daily For stent pain and/or kidney stone passage.  (Patient not taking: Reported on 7/11/2023) 30 capsule 0    dutasteride (AVODART) 0.5 MG capsule Take 1 capsule by mouth daily 30 capsule 3    blood glucose monitor kit and supplies Check sugars BID and

## 2023-09-25 NOTE — ASSESSMENT & PLAN NOTE
Symptoms and exam consistent with COPD exacerbation which likely started with viral illness. Denies shortness of breath or pleuritic chest pain. Declines chest xray order today. Reviewed strict criteria for following up. Start prednisone and azithromycin. Use albuterol inhaler as needed. Stop taking NyQuil. Suggested that he takes Coricidin HBP for symptomatic treatment.

## 2023-09-25 NOTE — TELEPHONE ENCOUNTER
Received copy of report. Concern for possible dementia. Recommended referral to gerentology vs neurology. I have placed referral to neurology at [facility]. Will likely take several months for an appointment. This is ok. Referral will need to be faxed (has been printed and is in my basket by printer). Patient/wife will need to call to schedule appointment.      [facility],   [address] [place] [address]  Phone: [phone]

## 2023-09-26 LAB
ALBUMIN SERPL-MCNC: 4.8 G/DL (ref 3.4–5)
ALBUMIN/GLOB SERPL: 1.5 {RATIO} (ref 1.1–2.2)
ALP SERPL-CCNC: 89 U/L (ref 40–129)
ALT SERPL-CCNC: 33 U/L (ref 10–40)
ANION GAP SERPL CALCULATED.3IONS-SCNC: 14 MMOL/L (ref 3–16)
AST SERPL-CCNC: 26 U/L (ref 15–37)
BILIRUB SERPL-MCNC: 0.6 MG/DL (ref 0–1)
BUN SERPL-MCNC: 12 MG/DL (ref 7–20)
CALCIUM SERPL-MCNC: 9.2 MG/DL (ref 8.3–10.6)
CHLORIDE SERPL-SCNC: 96 MMOL/L (ref 99–110)
CO2 SERPL-SCNC: 28 MMOL/L (ref 21–32)
CREAT SERPL-MCNC: 1.4 MG/DL (ref 0.8–1.3)
GFR SERPLBLD CREATININE-BSD FMLA CKD-EPI: 54 ML/MIN/{1.73_M2}
GLUCOSE SERPL-MCNC: 159 MG/DL (ref 70–99)
POTASSIUM SERPL-SCNC: 4.5 MMOL/L (ref 3.5–5.1)
PROT SERPL-MCNC: 8 G/DL (ref 6.4–8.2)
SODIUM SERPL-SCNC: 138 MMOL/L (ref 136–145)

## 2023-09-28 DIAGNOSIS — N17.9 ACUTE KIDNEY INJURY (HCC): Primary | ICD-10-CM

## 2023-09-28 DIAGNOSIS — D72.829 LEUKOCYTOSIS, UNSPECIFIED TYPE: ICD-10-CM

## 2023-09-29 ENCOUNTER — TELEPHONE (OUTPATIENT)
Dept: INTERNAL MEDICINE CLINIC | Age: 68
End: 2023-09-29

## 2023-09-29 NOTE — TELEPHONE ENCOUNTER
----- Message from [employer]  sent at 9/29/2023 11:57 AM EDT -----  Subject: Referral Request    Reason for referral request? neurology / cognitive impairment  Provider patient wants to be referred to(if known):     Provider Phone Number(if known): Additional Information for Provider? patients wife called [facility]   Neurology and they said that a DrSanju has to be listed to the referral   because they get so many a day that is the only way they can track down a   referral. She called twice and told the same thing. Can this be resent   with a Dr. name on it that he needs to see?  Then can they call wife and   let her know the name of the DrSanju that was referred so they can track it   down.  ---------------------------------------------------------------------------  --------------  [address]    [phone]; OK to leave message on voicemail  ---------------------------------------------------------------------------  --------------

## 2023-09-29 NOTE — TELEPHONE ENCOUNTER
Can you please call  Neurology. He needs to be seen by  the memory care team. I do not have a specific provider that he needs to see. If  is not helpful, I can refer to neurology at [facility] .

## 2023-09-30 DIAGNOSIS — E11.9 TYPE 2 DIABETES MELLITUS WITHOUT COMPLICATION, WITHOUT LONG-TERM CURRENT USE OF INSULIN (HCC): ICD-10-CM

## 2023-10-02 DIAGNOSIS — D72.829 LEUKOCYTOSIS, UNSPECIFIED TYPE: ICD-10-CM

## 2023-10-02 DIAGNOSIS — N17.9 ACUTE KIDNEY INJURY (HCC): ICD-10-CM

## 2023-10-02 RX ORDER — BLOOD SUGAR DIAGNOSTIC
STRIP MISCELLANEOUS
Qty: 100 STRIP | Refills: 1 | Status: SHIPPED | OUTPATIENT
Start: 2023-10-02

## 2023-10-03 ENCOUNTER — TELEPHONE (OUTPATIENT)
Dept: INTERNAL MEDICINE CLINIC | Age: 68
End: 2023-10-03

## 2023-10-03 LAB
ANION GAP SERPL CALCULATED.3IONS-SCNC: 12 MMOL/L (ref 3–16)
BASOPHILS # BLD: 0 K/UL (ref 0–0.2)
BASOPHILS NFR BLD: 0 %
BUN SERPL-MCNC: 17 MG/DL (ref 7–20)
CALCIUM SERPL-MCNC: 9.3 MG/DL (ref 8.3–10.6)
CHLORIDE SERPL-SCNC: 98 MMOL/L (ref 99–110)
CO2 SERPL-SCNC: 30 MMOL/L (ref 21–32)
CREAT SERPL-MCNC: 1.2 MG/DL (ref 0.8–1.3)
DEPRECATED RDW RBC AUTO: 14.4 % (ref 12.4–15.4)
EOSINOPHIL # BLD: 0.2 K/UL (ref 0–0.6)
EOSINOPHIL NFR BLD: 1 %
GFR SERPLBLD CREATININE-BSD FMLA CKD-EPI: >60 ML/MIN/{1.73_M2}
GLUCOSE SERPL-MCNC: 191 MG/DL (ref 70–99)
HCT VFR BLD AUTO: 39.1 % (ref 40.5–52.5)
HGB BLD-MCNC: 12.9 G/DL (ref 13.5–17.5)
LYMPHOCYTES # BLD: 2.3 K/UL (ref 1–5.1)
LYMPHOCYTES NFR BLD: 15 %
MCH RBC QN AUTO: 30.6 PG (ref 26–34)
MCHC RBC AUTO-ENTMCNC: 33 G/DL (ref 31–36)
MCV RBC AUTO: 92.9 FL (ref 80–100)
METAMYELOCYTES NFR BLD MANUAL: 3 %
MONOCYTES # BLD: 0.6 K/UL (ref 0–1.3)
MONOCYTES NFR BLD: 4 %
MYELOCYTES NFR BLD MANUAL: 6 %
NEUTROPHILS # BLD: 12.2 K/UL (ref 1.7–7.7)
NEUTROPHILS NFR BLD: 66 %
NEUTS BAND NFR BLD MANUAL: 5 % (ref 0–7)
OVALOCYTES BLD QL SMEAR: ABNORMAL
PLATELET # BLD AUTO: 350 K/UL (ref 135–450)
PLATELET BLD QL SMEAR: ADEQUATE
PMV BLD AUTO: 8.4 FL (ref 5–10.5)
POIKILOCYTOSIS BLD QL SMEAR: ABNORMAL
POTASSIUM SERPL-SCNC: 4.5 MMOL/L (ref 3.5–5.1)
RBC # BLD AUTO: 4.21 M/UL (ref 4.2–5.9)
SLIDE REVIEW: ABNORMAL
SODIUM SERPL-SCNC: 140 MMOL/L (ref 136–145)
WBC # BLD AUTO: 15.3 K/UL (ref 4–11)

## 2023-10-03 NOTE — TELEPHONE ENCOUNTER
Pt's wife called in regards to the Neuro referral that was placed. She states that the number on the referral is [facility] and weren't able to help her. I looked it up online and called myself to provide her with [phone].

## 2023-10-03 NOTE — TELEPHONE ENCOUNTER
Patient's wife called back and provided an E-mail. States that they wanted referral sent through e-mail instead of fax.   [email or web address]

## 2023-10-09 ENCOUNTER — OFFICE VISIT (OUTPATIENT)
Dept: PULMONOLOGY | Age: 68
End: 2023-10-09
Payer: MEDICARE

## 2023-10-09 VITALS
DIASTOLIC BLOOD PRESSURE: 70 MMHG | OXYGEN SATURATION: 96 % | BODY MASS INDEX: 29.97 KG/M2 | SYSTOLIC BLOOD PRESSURE: 114 MMHG | WEIGHT: 241 LBS | HEART RATE: 76 BPM | RESPIRATION RATE: 18 BRPM | TEMPERATURE: 97.2 F | HEIGHT: 75 IN

## 2023-10-09 DIAGNOSIS — Z96.82 S/P INSERTION OF HYPOGLOSSAL NERVE STIMULATOR: ICD-10-CM

## 2023-10-09 DIAGNOSIS — Z87.891 PERSONAL HISTORY OF TOBACCO USE: ICD-10-CM

## 2023-10-09 DIAGNOSIS — G47.34 NOCTURNAL HYPOXIA: ICD-10-CM

## 2023-10-09 DIAGNOSIS — Z87.891 HISTORY OF TOBACCO ABUSE: ICD-10-CM

## 2023-10-09 DIAGNOSIS — J44.9 CHRONIC OBSTRUCTIVE PULMONARY DISEASE, UNSPECIFIED COPD TYPE (HCC): Primary | ICD-10-CM

## 2023-10-09 DIAGNOSIS — G47.33 OBSTRUCTIVE SLEEP APNEA SYNDROME: ICD-10-CM

## 2023-10-09 PROCEDURE — 3023F SPIROM DOC REV: CPT | Performed by: INTERNAL MEDICINE

## 2023-10-09 PROCEDURE — 1123F ACP DISCUSS/DSCN MKR DOCD: CPT | Performed by: INTERNAL MEDICINE

## 2023-10-09 PROCEDURE — 99214 OFFICE O/P EST MOD 30 MIN: CPT | Performed by: INTERNAL MEDICINE

## 2023-10-09 PROCEDURE — G8427 DOCREV CUR MEDS BY ELIG CLIN: HCPCS | Performed by: INTERNAL MEDICINE

## 2023-10-09 PROCEDURE — 3017F COLORECTAL CA SCREEN DOC REV: CPT | Performed by: INTERNAL MEDICINE

## 2023-10-09 PROCEDURE — 3074F SYST BP LT 130 MM HG: CPT | Performed by: INTERNAL MEDICINE

## 2023-10-09 PROCEDURE — 3078F DIAST BP <80 MM HG: CPT | Performed by: INTERNAL MEDICINE

## 2023-10-09 PROCEDURE — G8484 FLU IMMUNIZE NO ADMIN: HCPCS | Performed by: INTERNAL MEDICINE

## 2023-10-09 PROCEDURE — 1036F TOBACCO NON-USER: CPT | Performed by: INTERNAL MEDICINE

## 2023-10-09 PROCEDURE — G8417 CALC BMI ABV UP PARAM F/U: HCPCS | Performed by: INTERNAL MEDICINE

## 2023-10-09 PROCEDURE — G0296 VISIT TO DETERM LDCT ELIG: HCPCS | Performed by: INTERNAL MEDICINE

## 2023-10-09 ASSESSMENT — ENCOUNTER SYMPTOMS
RESPIRATORY NEGATIVE: 1
GASTROINTESTINAL NEGATIVE: 1
ALLERGIC/IMMUNOLOGIC NEGATIVE: 1
EYES NEGATIVE: 1

## 2023-10-09 NOTE — PATIENT INSTRUCTIONS
considered a no show. Patients with three missed appointments within 1 year or four missed appointments within 2 years can be dismissed from the practice. Please be aware that our physicians are required to work in the Intensive Care Unit at [facility].  Your appointment may need to be rescheduled if they are designated to work during your appointment time. You may receive a survey regarding the care you received during your visit. Your input is valuable to us. We encourage you to complete and return your survey. We hope you will choose us in the future for your healthcare needs. Pt instructed of all future appointment dates & times, including radiology, labs, procedures & referrals. If procedures were scheduled preparation instructions provided. Instructions on future appointments with [facility] were given. [facility] [place]-[phone] Dec 15th or later.

## 2023-10-09 NOTE — PROGRESS NOTES
MA Communication: The following orders are received by verbal communication from India Hawkins MD    Orders include:  DME, LDCT in [address] [place] [address] [address], F/u 3 months.
default [+ - +] electrode configuration. Starting Range:  Lower Limit: 1.0 volts  Upper Limit: 2.0 volts            Rate: Freq 33 hz  And 90 pulse width  Amplitude 1.0    Start delay 30minutes  Pause delay 15minutes  Duration 8hours      Continue to increase the level up by 1 level point every 7 days, adjust to comfort, if it feels uncomfortable drop it back by 1 level. Inspire follow-up on 1/19/2023  Neurostimulator Reprogramming  Approximately 20 minutes was spent analyzing the airway and programming the device. Incoming Amplitude: 1.6 volts with the default [+ - +] electrode configuration. Incoming Control Range:  Lower Limit: 1.0 volts  Upper Limit: 2.0 volts         Rate: Freq 33 hz  And 90 pulse width  Amplitude 1.6    Start delay  30 minutes  Pause delay 15 minutes  Duration 8  hours      Time usage: 70 Hours per week      Outgoing Control Range:  Lower Limit: 1.0 volts  Upper Limit: 2.0 volts    Doing well with the current setting of level 6  We will continue to titrate slowly up  Will do MARCELLUS inspire titration      Increase use of inspire, keep adjusting the level up to 1 level every  7 days        Inspire titration done on 3/5/2023          Follow-up on inspire 3/30/2023  Neurostimulator Reprogramming  Approximately 20 minutes was spent analyzing the airway and programming the device. Incoming Amplitude: 1.9 volts with the default [+ - +] electrode configuration.   Incoming Control Range:  Lower Limit: 1.7 volts  Upper Limit: 2.7 volts         Rate: Freq 33 hz  And 90 pulse width  Amplitude 1.9    Start delay  30 minutes  Pause delay 15 minutes  Duration 8  hours      Time usage: 70 Hours per week      Outgoing Control Range:  Lower Limit: 1.9 volts  Upper Limit: 2.5 volts    Will start at level 2  with 2.0 volts  Will get noct pulse ox in 2 weeks and should be on level 4 at that time      Increase use of inspire, keep adjusting the level up to 1 level every  7 days      Follow-up on

## 2023-10-16 ENCOUNTER — TELEPHONE (OUTPATIENT)
Dept: PULMONOLOGY | Age: 68
End: 2023-10-16

## 2023-10-16 NOTE — TELEPHONE ENCOUNTER
Last office visit note from 10/9/23 has an order for ONPO but not O2.      Please review and order O2 if appropriate

## 2023-10-16 NOTE — TELEPHONE ENCOUNTER
Riddhi Harman states she called [facility] and they have not received order for in home oxygen. Please advise.  Thanks

## 2023-10-16 NOTE — TELEPHONE ENCOUNTER
Called [facility]  The manager was not there and so they could not pull up to see if the Pt has completed the ONPO. I told them we need the results ASAP if they have them.  Aleisha Olsen is supposed to call me back

## 2023-10-16 NOTE — TELEPHONE ENCOUNTER
Pt has not completed ONPO yet. Laury Perea is getting the Pt set up to do the ONPO with [facility].

## 2023-10-18 NOTE — TELEPHONE ENCOUNTER
Called [facility] and spoke with jennifer. They do have the order, have sent it to [facility], and are waiting for [facility] to respond.

## 2023-10-18 NOTE — TELEPHONE ENCOUNTER
They need the order for the pulse ox, Spencer Mendez spoke to Iam over there. Please send order.  thanks

## 2023-10-19 NOTE — TELEPHONE ENCOUNTER
Al jones Completed no results yet       Elvin Edwards called to inform our Dr. Osborne Cheadle of the ONPO being completed   I asked Deanna Rubi when we will get the report and she stated as soon as the results are ready   She confirmed that they will be faxed soon  However Dr. Osborne Cheadle wanted to know when this test was completed.

## 2023-10-30 DIAGNOSIS — E11.9 TYPE 2 DIABETES MELLITUS WITHOUT COMPLICATION, WITHOUT LONG-TERM CURRENT USE OF INSULIN (HCC): ICD-10-CM

## 2023-10-31 RX ORDER — LANCETS
EACH MISCELLANEOUS
Qty: 102 EACH | Refills: 5 | Status: SHIPPED | OUTPATIENT
Start: 2023-10-31

## 2023-11-21 ENCOUNTER — TELEPHONE (OUTPATIENT)
Dept: CASE MANAGEMENT | Age: 68
End: 2023-11-21

## 2023-11-22 RX ORDER — GLIPIZIDE 5 MG/1
TABLET, FILM COATED, EXTENDED RELEASE ORAL
Qty: 90 TABLET | Refills: 3 | Status: SHIPPED | OUTPATIENT
Start: 2023-11-22

## 2023-11-26 DIAGNOSIS — E11.9 TYPE 2 DIABETES MELLITUS WITHOUT COMPLICATION, WITHOUT LONG-TERM CURRENT USE OF INSULIN (HCC): ICD-10-CM

## 2023-11-27 RX ORDER — BLOOD SUGAR DIAGNOSTIC
STRIP MISCELLANEOUS
Qty: 100 STRIP | Refills: 1 | Status: SHIPPED | OUTPATIENT
Start: 2023-11-27

## 2023-12-01 ENCOUNTER — OFFICE VISIT (OUTPATIENT)
Dept: INTERNAL MEDICINE CLINIC | Age: 68
End: 2023-12-01

## 2023-12-01 ENCOUNTER — OFFICE VISIT (OUTPATIENT)
Dept: INTERNAL MEDICINE CLINIC | Age: 68
End: 2023-12-01
Payer: MEDICARE

## 2023-12-01 VITALS
SYSTOLIC BLOOD PRESSURE: 108 MMHG | WEIGHT: 244.2 LBS | HEART RATE: 82 BPM | DIASTOLIC BLOOD PRESSURE: 58 MMHG | BODY MASS INDEX: 30.52 KG/M2 | OXYGEN SATURATION: 99 %

## 2023-12-01 VITALS — BODY MASS INDEX: 30.53 KG/M2 | WEIGHT: 244.27 LBS

## 2023-12-01 DIAGNOSIS — G47.33 OBSTRUCTIVE SLEEP APNEA SYNDROME: ICD-10-CM

## 2023-12-01 DIAGNOSIS — R80.9 TYPE 2 DIABETES MELLITUS WITH MICROALBUMINURIA, WITHOUT LONG-TERM CURRENT USE OF INSULIN (HCC): Primary | ICD-10-CM

## 2023-12-01 DIAGNOSIS — J44.9 COPD, MODERATE (HCC): ICD-10-CM

## 2023-12-01 DIAGNOSIS — R79.89 ABNORMAL CBC: ICD-10-CM

## 2023-12-01 DIAGNOSIS — I10 ESSENTIAL HYPERTENSION: Chronic | ICD-10-CM

## 2023-12-01 DIAGNOSIS — E78.5 HYPERLIPIDEMIA LDL GOAL <70: ICD-10-CM

## 2023-12-01 DIAGNOSIS — R41.89 COGNITIVE IMPAIRMENT: ICD-10-CM

## 2023-12-01 DIAGNOSIS — E11.29 TYPE 2 DIABETES MELLITUS WITH MICROALBUMINURIA, WITHOUT LONG-TERM CURRENT USE OF INSULIN (HCC): Primary | ICD-10-CM

## 2023-12-01 DIAGNOSIS — Z28.09 VACCINE CONTRAINDICATED: ICD-10-CM

## 2023-12-01 DIAGNOSIS — Z00.00 MEDICARE ANNUAL WELLNESS VISIT, SUBSEQUENT: Primary | ICD-10-CM

## 2023-12-01 PROBLEM — J44.1 COPD EXACERBATION (HCC): Status: RESOLVED | Noted: 2023-09-25 | Resolved: 2023-12-01

## 2023-12-01 PROCEDURE — 1036F TOBACCO NON-USER: CPT | Performed by: INTERNAL MEDICINE

## 2023-12-01 PROCEDURE — 3078F DIAST BP <80 MM HG: CPT | Performed by: INTERNAL MEDICINE

## 2023-12-01 PROCEDURE — 3023F SPIROM DOC REV: CPT | Performed by: INTERNAL MEDICINE

## 2023-12-01 PROCEDURE — 1123F ACP DISCUSS/DSCN MKR DOCD: CPT | Performed by: INTERNAL MEDICINE

## 2023-12-01 PROCEDURE — 99214 OFFICE O/P EST MOD 30 MIN: CPT | Performed by: INTERNAL MEDICINE

## 2023-12-01 PROCEDURE — G8417 CALC BMI ABV UP PARAM F/U: HCPCS | Performed by: INTERNAL MEDICINE

## 2023-12-01 PROCEDURE — G8484 FLU IMMUNIZE NO ADMIN: HCPCS | Performed by: INTERNAL MEDICINE

## 2023-12-01 PROCEDURE — 3017F COLORECTAL CA SCREEN DOC REV: CPT | Performed by: INTERNAL MEDICINE

## 2023-12-01 PROCEDURE — G8427 DOCREV CUR MEDS BY ELIG CLIN: HCPCS | Performed by: INTERNAL MEDICINE

## 2023-12-01 PROCEDURE — 3046F HEMOGLOBIN A1C LEVEL >9.0%: CPT | Performed by: INTERNAL MEDICINE

## 2023-12-01 PROCEDURE — 2022F DILAT RTA XM EVC RTNOPTHY: CPT | Performed by: INTERNAL MEDICINE

## 2023-12-01 PROCEDURE — 3074F SYST BP LT 130 MM HG: CPT | Performed by: INTERNAL MEDICINE

## 2023-12-01 ASSESSMENT — PATIENT HEALTH QUESTIONNAIRE - PHQ9
2. FEELING DOWN, DEPRESSED OR HOPELESS: 0
SUM OF ALL RESPONSES TO PHQ QUESTIONS 1-9: 0
SUM OF ALL RESPONSES TO PHQ9 QUESTIONS 1 & 2: 0
1. LITTLE INTEREST OR PLEASURE IN DOING THINGS: 0

## 2023-12-01 ASSESSMENT — LIFESTYLE VARIABLES
HOW MANY STANDARD DRINKS CONTAINING ALCOHOL DO YOU HAVE ON A TYPICAL DAY: PATIENT DOES NOT DRINK
HOW OFTEN DO YOU HAVE A DRINK CONTAINING ALCOHOL: NEVER

## 2023-12-01 NOTE — PROGRESS NOTES
Cognitive impairment      Scheduled to see neurology at [facility] later this month. Stable per patient and wife. COPD, moderate ([address])      Following with Dr. José Miguel Adams for pulmonology. Has moderate COPD based on PFTs. Asymptomatic. Remains albuterol as needed nocturnal oxygen. Relevant Orders    CBC with Auto Differential    Hyperlipidemia LDL goal <70     Patient and patient's wife did not notice improvement in his cognition off of Crestor. LDL is slightly above goal at 77. Continue Crestor 20 mg daily. Consider adding Zetia. Obstructive sleep apnea syndrome      Doing better with inspire and nocturnal oxygen. Patient's sleep study post inspire was done with his inspire device off. He is scheduled repeat sleep study in the near future. Follows with Dr. José Miguel Adams. Type 2 diabetes mellitus with microalbuminuria, without long-term current use of insulin ([facility]) - Primary      Well-controlled with hemoglobin A1c of 6.8%. Continue Trulicity 4.5 mg weekly, metformin ER 1500 mg daily, Farxiga 10 mg daily, and glipizide ER 5 mg daily. Consider discontinuing glipizide if hemoglobin A1c remains well-controlled. Relevant Orders    Comprehensive Metabolic Panel    Hemoglobin A1C    Microalbumin / Creatinine Urine Ratio    Vaccine contraindicated      Patient has allergy to flu shot.             Current Outpatient Medications   Medication Sig Dispense Refill    ACCU-CHEK GUIDE strip TEST BLOOD GLUCOSE TWICE DAILY AND AS NEEDED FOR SYMPTOMS OF IRREGULAR BLOOD GLUCOSE 100 strip 1    glipiZIDE (GLUCOTROL XL) 5 MG extended release tablet TAKE 1 TABLET BY MOUTH EVERY DAY IN THE MORNING 90 tablet 3    Accu-Chek FastClix Lancets MISC USE AS DIRECTED TWICE DAILY 102 each 5    rosuvastatin (CRESTOR) 20 MG tablet Take 1 tablet by mouth daily 90 tablet 3    Dulaglutide (TRULICITY) 4.5 US/2.7ZK SOPN Inject 4.5 mg into the skin once a week 12 Adjustable Dose Pre-filled Pen Syringe 1    metFORMIN

## 2023-12-02 PROBLEM — R79.89 ABNORMAL CBC: Status: ACTIVE | Noted: 2023-12-02

## 2023-12-02 PROBLEM — E87.5 HYPERKALEMIA: Status: RESOLVED | Noted: 2023-07-11 | Resolved: 2023-12-02

## 2023-12-02 PROBLEM — Z86.19 HISTORY OF SEPSIS: Status: RESOLVED | Noted: 2023-07-11 | Resolved: 2023-12-02

## 2023-12-05 RX ORDER — OMEPRAZOLE 20 MG/1
20 CAPSULE, DELAYED RELEASE ORAL DAILY
Qty: 90 CAPSULE | Refills: 3 | Status: SHIPPED | OUTPATIENT
Start: 2023-12-05

## 2024-01-03 ENCOUNTER — HOSPITAL ENCOUNTER (OUTPATIENT)
Dept: CT IMAGING | Age: 69
Discharge: HOME | End: 2024-01-03
Attending: INTERNAL MEDICINE
Payer: MEDICARE

## 2024-01-03 DIAGNOSIS — R79.89 ABNORMAL CBC: ICD-10-CM

## 2024-01-03 DIAGNOSIS — Z87.891 PERSONAL HISTORY OF TOBACCO USE: ICD-10-CM

## 2024-01-03 LAB
BASOPHILS # BLD: 0 K/UL (ref 0–0.2)
BASOPHILS NFR BLD: 0 %
DEPRECATED RDW RBC AUTO: 14 % (ref 12.4–15.4)
EOSINOPHIL # BLD: 0.6 K/UL (ref 0–0.6)
EOSINOPHIL NFR BLD: 6 %
HCT VFR BLD AUTO: 40.2 % (ref 40.5–52.5)
HGB BLD-MCNC: 13.7 G/DL (ref 13.5–17.5)
LYMPHOCYTES # BLD: 1.6 K/UL (ref 1–5.1)
LYMPHOCYTES NFR BLD: 13 %
MCH RBC QN AUTO: 30.7 PG (ref 26–34)
MCHC RBC AUTO-ENTMCNC: 34.1 G/DL (ref 31–36)
MCV RBC AUTO: 89.9 FL (ref 80–100)
MONOCYTES # BLD: 0.9 K/UL (ref 0–1.3)
MONOCYTES NFR BLD: 9 %
MYELOCYTES NFR BLD MANUAL: 1 %
NEUTROPHILS # BLD: 7 K/UL (ref 1.7–7.7)
NEUTROPHILS NFR BLD: 65 %
NEUTS BAND NFR BLD MANUAL: 3 % (ref 0–7)
PLATELET # BLD AUTO: 210 K/UL (ref 135–450)
PLATELET BLD QL SMEAR: ADEQUATE
PMV BLD AUTO: 8.8 FL (ref 5–10.5)
RBC # BLD AUTO: 4.47 M/UL (ref 4.2–5.9)
RBC MORPH BLD: NORMAL
SLIDE REVIEW: ABNORMAL
VARIANT LYMPHS NFR BLD MANUAL: 3 % (ref 0–6)
WBC # BLD AUTO: 10.1 K/UL (ref 4–11)

## 2024-01-03 PROCEDURE — 71271 CT THORAX LUNG CANCER SCR C-: CPT

## 2024-01-05 ENCOUNTER — TELEPHONE (OUTPATIENT)
Dept: INTERNAL MEDICINE CLINIC | Age: 69
End: 2024-01-05

## 2024-01-05 NOTE — TELEPHONE ENCOUNTER
Submitted PA for TRULICITY 4.5MG/0.5ML  Via CMM (Key: [id number]) STATUS: PENDING.    Follow up done daily; if no decision with in three days we will refax.  If another three days goes by with no decision will call the insurance for status.

## 2024-01-08 RX ORDER — CARVEDILOL 6.25 MG/1
6.25 TABLET ORAL 2 TIMES DAILY
Qty: 180 TABLET | Refills: 1 | Status: SHIPPED | OUTPATIENT
Start: 2024-01-08

## 2024-01-09 NOTE — TELEPHONE ENCOUNTER
The medication is APPROVED THRU 12/31/24    If this requires a response please respond to the pool ( P MHCX PSC MEDICATION PRE-AUTH).      Thank you please advise patient.

## 2024-02-02 RX ORDER — RANOLAZINE 500 MG/1
TABLET, EXTENDED RELEASE ORAL
Qty: 180 TABLET | Refills: 1 | Status: SHIPPED | OUTPATIENT
Start: 2024-02-02

## 2024-02-06 RX ORDER — ALBUTEROL SULFATE 90 UG/1
AEROSOL, METERED RESPIRATORY (INHALATION)
Qty: 6.7 G | Refills: 4 | Status: SHIPPED | OUTPATIENT
Start: 2024-02-06

## 2024-02-06 NOTE — TELEPHONE ENCOUNTER
Last OV: 12/1/2023  Next OV: 4/5/2024    Next appointment due:(on 12/1/2024).     Last fill:3/31/23  Refills:0

## 2024-03-13 NOTE — TELEPHONE ENCOUNTER
Received refill request for losartan from [facility] pharmacy.    Last ov:2023 DCE    Last labs:10/02/2023    Last EK2023    Last Refill:2023    Next appointment:2024 NPTS

## 2024-03-14 RX ORDER — LOSARTAN POTASSIUM 25 MG/1
25 TABLET ORAL DAILY
Qty: 90 TABLET | Refills: 3 | Status: SHIPPED | OUTPATIENT
Start: 2024-03-14

## 2024-03-14 RX ORDER — DULAGLUTIDE 4.5 MG/.5ML
INJECTION, SOLUTION SUBCUTANEOUS
Qty: 12 ADJUSTABLE DOSE PRE-FILLED PEN SYRINGE | Refills: 1 | Status: SHIPPED | OUTPATIENT
Start: 2024-03-14

## 2024-03-18 ENCOUNTER — OFFICE VISIT (OUTPATIENT)
Dept: PULMONOLOGY | Age: 69
End: 2024-03-18
Payer: MEDICARE

## 2024-03-18 VITALS
HEART RATE: 75 BPM | WEIGHT: 247 LBS | HEIGHT: 75 IN | RESPIRATION RATE: 16 BRPM | SYSTOLIC BLOOD PRESSURE: 118 MMHG | BODY MASS INDEX: 30.71 KG/M2 | OXYGEN SATURATION: 95 % | TEMPERATURE: 97.2 F | DIASTOLIC BLOOD PRESSURE: 60 MMHG

## 2024-03-18 DIAGNOSIS — Z87.891 PERSONAL HISTORY OF TOBACCO USE: ICD-10-CM

## 2024-03-18 DIAGNOSIS — Z96.82 S/P INSERTION OF HYPOGLOSSAL NERVE STIMULATOR: ICD-10-CM

## 2024-03-18 DIAGNOSIS — G47.33 OBSTRUCTIVE SLEEP APNEA SYNDROME: Primary | ICD-10-CM

## 2024-03-18 DIAGNOSIS — J44.9 COPD, MODERATE (HCC): ICD-10-CM

## 2024-03-18 PROCEDURE — 95976 ALYS SMPL CN NPGT PRGRMG: CPT | Performed by: INTERNAL MEDICINE

## 2024-03-18 PROCEDURE — 3074F SYST BP LT 130 MM HG: CPT | Performed by: INTERNAL MEDICINE

## 2024-03-18 PROCEDURE — 99215 OFFICE O/P EST HI 40 MIN: CPT | Performed by: INTERNAL MEDICINE

## 2024-03-18 PROCEDURE — G8484 FLU IMMUNIZE NO ADMIN: HCPCS | Performed by: INTERNAL MEDICINE

## 2024-03-18 PROCEDURE — 3078F DIAST BP <80 MM HG: CPT | Performed by: INTERNAL MEDICINE

## 2024-03-18 PROCEDURE — 1036F TOBACCO NON-USER: CPT | Performed by: INTERNAL MEDICINE

## 2024-03-18 PROCEDURE — 3023F SPIROM DOC REV: CPT | Performed by: INTERNAL MEDICINE

## 2024-03-18 PROCEDURE — G8417 CALC BMI ABV UP PARAM F/U: HCPCS | Performed by: INTERNAL MEDICINE

## 2024-03-18 PROCEDURE — G8427 DOCREV CUR MEDS BY ELIG CLIN: HCPCS | Performed by: INTERNAL MEDICINE

## 2024-03-18 PROCEDURE — 3017F COLORECTAL CA SCREEN DOC REV: CPT | Performed by: INTERNAL MEDICINE

## 2024-03-18 PROCEDURE — 1123F ACP DISCUSS/DSCN MKR DOCD: CPT | Performed by: INTERNAL MEDICINE

## 2024-03-18 ASSESSMENT — ENCOUNTER SYMPTOMS
GASTROINTESTINAL NEGATIVE: 1
EYES NEGATIVE: 1
RESPIRATORY NEGATIVE: 1
ALLERGIC/IMMUNOLOGIC NEGATIVE: 1

## 2024-03-18 NOTE — PROGRESS NOTES
Parker Swain Jr. (: 1955 ) is a 68 y.o. male here for an evaluation of   No chief complaint on file.          ASSESSMENT/PLAN:   Diagnosis Orders   1. Obstructive sleep apnea syndrome        2. COPD, moderate (HCC)        3. S/P insertion of hypoglossal nerve stimulator        4. Personal history of tobacco use            Sleep study to qualify for inspire    BMI at the time of of implantation was 31      Drug-induced sleep endoscopy  Date of Procedure: 22  Time: 0715     Pre Operative Diagnoses: Obstructive Sleep Apnea  Post Operative Diagnoses:  Obstructive Sleep Apnea           Procedure:  1. Drug Induced Sleep endoscopy (58028)       Surgeon: Becca Smith DO  A mild lateral wall component was noted, but the palatal collapse was primarily an anterior posterior fashion.  More distally, mild lateral oropharyngeal wall component was noted, but again no complete lateral oropharyngeal collapse.  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  In summary, there is no evidence of complete concentric palatal obstruction and does appear to be a candidate anatomically for hypoglossal nerve stimulation therapy.              Inspire implantation  Date of Operation:   10/24/22       Post-operative Diagnosis:   Obstructive sleep apnea     Operative Procedure:   Procedure(s):  INSPIRE DEVICE PLACEMENT (N/A)      Attending Surgeon:   Becca Smith DO      Patient has come in for sleep activation of inspire on 2022  Neurostimulator Reprogramming  Approximately 20 minutes was spent analyzing the airway and programming the device.  Sensing voltage:  0.5 volts  Starting amplitude: 1.0 volts with the default [+ - +] electrode configuration.  Starting Range:  Lower Limit: 1.0 volts  Upper Limit: 2.0 volts            Rate: Freq 33 hz  And 90 pulse width  Amplitude 1.0    Start delay 30minutes  Pause delay 15minutes  Duration

## 2024-03-18 NOTE — PATIENT INSTRUCTIONS
ASSESSMENT/PLAN:   Diagnosis Orders   1. Obstructive sleep apnea syndrome        2. COPD, moderate (HCC)        3. S/P insertion of hypoglossal nerve stimulator        4. Personal history of tobacco use            Sleep study to qualify for inspire    BMI at the time of of implantation was 31      Drug-induced sleep endoscopy  Date of Procedure: 09/13/22  Time: 0715     Pre Operative Diagnoses: Obstructive Sleep Apnea  Post Operative Diagnoses:  Obstructive Sleep Apnea           Procedure:  1. Drug Induced Sleep endoscopy (18102)       Surgeon: Becca Smith DO  A mild lateral wall component was noted, but the palatal collapse was primarily an anterior posterior fashion.  More distally, mild lateral oropharyngeal wall component was noted, but again no complete lateral oropharyngeal collapse.  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  In summary, there is no evidence of complete concentric palatal obstruction and does appear to be a candidate anatomically for hypoglossal nerve stimulation therapy.              Inspire implantation  Date of Operation:   10/24/22       Post-operative Diagnosis:   Obstructive sleep apnea     Operative Procedure:   Procedure(s):  INSPIRE DEVICE PLACEMENT (N/A)      Attending Surgeon:   Becca Smith DO      Patient has come in for sleep activation of inspire on 12/8/2022  Neurostimulator Reprogramming  Approximately 20 minutes was spent analyzing the airway and programming the device.  Sensing voltage:  0.5 volts  Starting amplitude: 1.0 volts with the default [+ - +] electrode configuration.  Starting Range:  Lower Limit: 1.0 volts  Upper Limit: 2.0 volts            Rate: Freq 33 hz  And 90 pulse width  Amplitude 1.0    Start delay 30minutes  Pause delay 15minutes  Duration 8hours      Continue to increase the level up by 1 level point every 7 days, adjust to comfort, if it feels uncomfortable

## 2024-03-18 NOTE — PROGRESS NOTES
MA Communication:  The following orders are received by verbal communication from Tejas Cabral MD    Orders include:    Patient to call the office for follow up

## 2024-04-01 ENCOUNTER — TELEPHONE (OUTPATIENT)
Dept: PULMONOLOGY | Age: 69
End: 2024-04-01

## 2024-04-01 NOTE — TELEPHONE ENCOUNTER
Called pt, Everything is going well with new inspire settings. He is going to follow Dr. Cabral to new office.

## 2024-04-01 NOTE — TELEPHONE ENCOUNTER
----- Message from Tejas Cabral MD sent at 3/18/2024  2:34 PM EDT -----  Call him in 2 weeks about the change in volts

## 2024-04-03 ENCOUNTER — OFFICE VISIT (OUTPATIENT)
Dept: INTERNAL MEDICINE CLINIC | Age: 69
End: 2024-04-03
Payer: MEDICARE

## 2024-04-03 VITALS
OXYGEN SATURATION: 97 % | HEART RATE: 74 BPM | HEIGHT: 75 IN | BODY MASS INDEX: 30.34 KG/M2 | SYSTOLIC BLOOD PRESSURE: 118 MMHG | WEIGHT: 244 LBS | DIASTOLIC BLOOD PRESSURE: 62 MMHG

## 2024-04-03 DIAGNOSIS — R79.89 ABNORMAL CBC: Primary | ICD-10-CM

## 2024-04-03 DIAGNOSIS — R80.9 TYPE 2 DIABETES MELLITUS WITH MICROALBUMINURIA, WITHOUT LONG-TERM CURRENT USE OF INSULIN (HCC): ICD-10-CM

## 2024-04-03 DIAGNOSIS — E11.29 TYPE 2 DIABETES MELLITUS WITH MICROALBUMINURIA, WITHOUT LONG-TERM CURRENT USE OF INSULIN (HCC): ICD-10-CM

## 2024-04-03 DIAGNOSIS — J44.9 COPD, MODERATE (HCC): ICD-10-CM

## 2024-04-03 DIAGNOSIS — R41.89 COGNITIVE IMPAIRMENT: ICD-10-CM

## 2024-04-03 DIAGNOSIS — I10 ESSENTIAL HYPERTENSION: Chronic | ICD-10-CM

## 2024-04-03 DIAGNOSIS — G47.33 OBSTRUCTIVE SLEEP APNEA SYNDROME: ICD-10-CM

## 2024-04-03 PROCEDURE — G8417 CALC BMI ABV UP PARAM F/U: HCPCS | Performed by: INTERNAL MEDICINE

## 2024-04-03 PROCEDURE — 3074F SYST BP LT 130 MM HG: CPT | Performed by: INTERNAL MEDICINE

## 2024-04-03 PROCEDURE — 3046F HEMOGLOBIN A1C LEVEL >9.0%: CPT | Performed by: INTERNAL MEDICINE

## 2024-04-03 PROCEDURE — 3023F SPIROM DOC REV: CPT | Performed by: INTERNAL MEDICINE

## 2024-04-03 PROCEDURE — 2022F DILAT RTA XM EVC RTNOPTHY: CPT | Performed by: INTERNAL MEDICINE

## 2024-04-03 PROCEDURE — 1123F ACP DISCUSS/DSCN MKR DOCD: CPT | Performed by: INTERNAL MEDICINE

## 2024-04-03 PROCEDURE — 99214 OFFICE O/P EST MOD 30 MIN: CPT | Performed by: INTERNAL MEDICINE

## 2024-04-03 PROCEDURE — 3017F COLORECTAL CA SCREEN DOC REV: CPT | Performed by: INTERNAL MEDICINE

## 2024-04-03 PROCEDURE — 1036F TOBACCO NON-USER: CPT | Performed by: INTERNAL MEDICINE

## 2024-04-03 PROCEDURE — 3078F DIAST BP <80 MM HG: CPT | Performed by: INTERNAL MEDICINE

## 2024-04-03 PROCEDURE — G8427 DOCREV CUR MEDS BY ELIG CLIN: HCPCS | Performed by: INTERNAL MEDICINE

## 2024-04-03 RX ORDER — KETOCONAZOLE 20 MG/G
CREAM TOPICAL
Qty: 60 G | Refills: 5 | Status: SHIPPED | OUTPATIENT
Start: 2024-04-03

## 2024-04-03 RX ORDER — LANOLIN ALCOHOL/MO/W.PET/CERES
1000 CREAM (GRAM) TOPICAL DAILY
COMMUNITY
Start: 2024-01-17

## 2024-04-03 SDOH — ECONOMIC STABILITY: FOOD INSECURITY: WITHIN THE PAST 12 MONTHS, THE FOOD YOU BOUGHT JUST DIDN'T LAST AND YOU DIDN'T HAVE MONEY TO GET MORE.: NEVER TRUE

## 2024-04-03 SDOH — ECONOMIC STABILITY: FOOD INSECURITY: WITHIN THE PAST 12 MONTHS, YOU WORRIED THAT YOUR FOOD WOULD RUN OUT BEFORE YOU GOT MONEY TO BUY MORE.: NEVER TRUE

## 2024-04-03 SDOH — ECONOMIC STABILITY: INCOME INSECURITY: HOW HARD IS IT FOR YOU TO PAY FOR THE VERY BASICS LIKE FOOD, HOUSING, MEDICAL CARE, AND HEATING?: NOT HARD AT ALL

## 2024-04-03 ASSESSMENT — PATIENT HEALTH QUESTIONNAIRE - PHQ9
2. FEELING DOWN, DEPRESSED OR HOPELESS: MORE THAN HALF THE DAYS
SUM OF ALL RESPONSES TO PHQ QUESTIONS 1-9: 2
1. LITTLE INTEREST OR PLEASURE IN DOING THINGS: NOT AT ALL
SUM OF ALL RESPONSES TO PHQ QUESTIONS 1-9: 2
SUM OF ALL RESPONSES TO PHQ9 QUESTIONS 1 & 2: 2

## 2024-04-03 NOTE — PROGRESS NOTES
remainder of the differential being normal.  Allergies   Allergen Reactions    Influenza Vaccines Rash    Lisinopril Rash      Past Medical History:   Diagnosis Date    Acid reflux     CAD (coronary artery disease)     COVID 12/28/2022    Hyperlipidemia     Hypertension     MI (myocardial infarction) ([facility]) 2014    Obstructive sleep apnea syndrome 05/18/2021    Type 2 diabetes mellitus without complication ([facility])       Past Surgical History:   Procedure Laterality Date    CARPAL TUNNEL RELEASE Left 2022    COLONOSCOPY      CYSTOSCOPY Left 7/6/2023    CYSTOSCOPY WITH LEFT STENT REMOVAL WITH LEFT URETEROSCOPY WITH HOLMIUM LASER LITHOTRIPSY STONE MANIPULATION STONE BASKET LEFT STENT PLACEMENT-[facility] #[id number] performed by Mamadou Saldivar MD at [facility] OR    FOOT SURGERY Right 11/05/2020    fracture    LARYNGOSCOPY N/A 09/13/2022    DRUG INDUCED SLEEP ENDOSCOPY performed by Becca Smith DO at [facility] OR    PTCA  2104    PTCA/Stent x 1 vessel. following MI    STIMULATOR SURGERY N/A 10/24/2022    INSPIRE DEVICE PLACEMENT performed by Becca Smith DO at [facility] OR    ULNAR NERVE REPAIR Left 2022      Social History     Socioeconomic History    Marital status:      Spouse name: Not on file    Number of children: Not on file    Years of education: Not on file    Highest education level: Not on file   Occupational History    Not on file   Tobacco Use    Smoking status: Former     Current packs/day: 0.00     Average packs/day: 1 pack/day for 48.4 years (48.4 ttl pk-yrs)     Types: Cigarettes     Start date: 1972     Quit date: 6/1/2020     Years since quitting: 3.8    Smokeless tobacco: Never   Vaping Use    Vaping Use: Never used   Substance and Sexual Activity    Alcohol use: Not Currently    Drug use: Never    Sexual activity: Yes     Partners: Female   Other Topics Concern    Not on file   Social History Narrative    Not on file     Social Determinants of Health     Financial Resource Strain: Low Risk  (4/3/2024)

## 2024-04-06 ASSESSMENT — ENCOUNTER SYMPTOMS
SHORTNESS OF BREATH: 0
DIARRHEA: 0
CHEST TIGHTNESS: 0
CONSTIPATION: 0

## 2024-04-06 NOTE — ASSESSMENT & PLAN NOTE
Following with Dr. Cabral for pulmonology.  Has moderate COPD based on PFTs.  Asymptomatic.  Remains albuterol as needed nocturnal oxygen.

## 2024-04-06 NOTE — ASSESSMENT & PLAN NOTE
Has established with neurology at .  Underwent neuropsych testing.  I cannot see the results of that testing.  He will follow-up with  in May.  MRI at  did show mild to moderate chronic ischemic vessel changes with mild global cerebral atrophy.  Blood work included a vitamin B12 level which was mildly low and he was added on oral B12.  Syphilis and HIV were both negative and TSH, folic acid, and methylmalonic acid were all normal.

## 2024-04-06 NOTE — ASSESSMENT & PLAN NOTE
Well-controlled with hemoglobin A1c of 6.0%.  Continue Trulicity 4.5 mg weekly, metformin ER 1500 mg daily, Farxiga 10 mg daily, and glipizide ER 5 mg daily.  Consider discontinuing glipizide if hemoglobin A1c remains well-controlled.

## 2024-04-10 DIAGNOSIS — E11.9 TYPE 2 DIABETES MELLITUS WITHOUT COMPLICATION, WITHOUT LONG-TERM CURRENT USE OF INSULIN (HCC): ICD-10-CM

## 2024-04-10 RX ORDER — BLOOD SUGAR DIAGNOSTIC
STRIP MISCELLANEOUS
Qty: 300 STRIP | Refills: 1 | Status: SHIPPED | OUTPATIENT
Start: 2024-04-10

## 2024-04-15 ENCOUNTER — OFFICE VISIT (OUTPATIENT)
Dept: CARDIOLOGY CLINIC | Age: 69
End: 2024-04-15

## 2024-04-15 VITALS
BODY MASS INDEX: 29.72 KG/M2 | HEIGHT: 75 IN | HEART RATE: 80 BPM | SYSTOLIC BLOOD PRESSURE: 118 MMHG | DIASTOLIC BLOOD PRESSURE: 68 MMHG | WEIGHT: 239 LBS | OXYGEN SATURATION: 97 %

## 2024-04-15 DIAGNOSIS — E78.5 HYPERLIPIDEMIA LDL GOAL <70: ICD-10-CM

## 2024-04-15 DIAGNOSIS — I10 ESSENTIAL HYPERTENSION: ICD-10-CM

## 2024-04-15 DIAGNOSIS — I25.10 CORONARY ARTERY DISEASE INVOLVING NATIVE CORONARY ARTERY OF NATIVE HEART WITHOUT ANGINA PECTORIS: Primary | Chronic | ICD-10-CM

## 2024-04-15 NOTE — PROGRESS NOTES
[facility]     Outpatient Follow Up Note    Parker Swain Jr. is 68 y.o. male who presents today with a history of NSTEMI / CAD s/p PTCA OM )ostial CX with mod disease, not significant by FFR '12, prox-LAD ectasia / diffuse disease not amenable to PCI; HTN and hyperlipidemia.      CHIEF COMPLAINT / HPI:  Follow Up secondary to coronary artery disease    Subjective:   The past two months he's been getting some chest tightness. He has no associated symptoms. It comes/goes quickly. He doesn't think its his angina since it feels more like a spasm. He's had no discomfort when active. He has discomfort when he's thinking about his son / daily ( 2 months ago).    There is no SOB/QUINTERO. The patient denies orthopnea/PND. He has an Inspire in addition to 2L O2 NC. His SaPO2 runs ~ 95%. The patient does not have swelling. The patients weight is stable . The patient is not experiencing palpitations or dizziness.   His BP runs ~ 118/70    These symptoms show no change since the last OV.   With regard to medication therapy the patient has been compliant with prescribed regimen. They have tolerated therapy to date.     Past Medical History:   Diagnosis Date    Acid reflux     CAD (coronary artery disease)     COVID 2022    Hyperlipidemia     Hypertension     MI (myocardial infarction) ([facility])     Obstructive sleep apnea syndrome 2021    Type 2 diabetes mellitus without complication ([facility])      Social History:    Social History     Tobacco Use   Smoking Status Former    Current packs/day: 0.00    Average packs/day: 1 pack/day for 48.4 years (48.4 ttl pk-yrs)    Types: Cigarettes    Start date:     Quit date: 2020    Years since quitting: 3.8   Smokeless Tobacco Never     Current Medications:  Current Outpatient Medications   Medication Sig Dispense Refill    vitamin B-12 (CYANOCOBALAMIN) 1000 MCG tablet Take 1 tablet by mouth daily      ketoconazole (NIZORAL) 2 % cream Apply topically daily. 60

## 2024-04-29 DIAGNOSIS — J44.9 COPD, MODERATE (HCC): ICD-10-CM

## 2024-04-29 RX ORDER — RANOLAZINE 500 MG/1
TABLET, EXTENDED RELEASE ORAL
Qty: 180 TABLET | Refills: 1 | OUTPATIENT
Start: 2024-04-29

## 2024-04-29 NOTE — TELEPHONE ENCOUNTER
Last OV: 4/3/2024  Next OV: 8/21/2024    Next appointment due:(around 8/3/2024     Last fill:2/2/24  Refills:1#180

## 2024-04-30 LAB
BASOPHILS # BLD: 0 K/UL (ref 0–0.2)
BASOPHILS NFR BLD: 0.5 %
DEPRECATED RDW RBC AUTO: 14.7 % (ref 12.4–15.4)
EOSINOPHIL # BLD: 0.3 K/UL (ref 0–0.6)
EOSINOPHIL NFR BLD: 3.5 %
HCT VFR BLD AUTO: 37.7 % (ref 40.5–52.5)
HGB BLD-MCNC: 12.8 G/DL (ref 13.5–17.5)
LYMPHOCYTES # BLD: 1.9 K/UL (ref 1–5.1)
LYMPHOCYTES NFR BLD: 23.9 %
MCH RBC QN AUTO: 31.1 PG (ref 26–34)
MCHC RBC AUTO-ENTMCNC: 33.9 G/DL (ref 31–36)
MCV RBC AUTO: 91.9 FL (ref 80–100)
MONOCYTES # BLD: 0.5 K/UL (ref 0–1.3)
MONOCYTES NFR BLD: 5.7 %
NEUTROPHILS # BLD: 5.4 K/UL (ref 1.7–7.7)
NEUTROPHILS NFR BLD: 66.4 %
PLATELET # BLD AUTO: 169 K/UL (ref 135–450)
PMV BLD AUTO: 8.6 FL (ref 5–10.5)
RBC # BLD AUTO: 4.11 M/UL (ref 4.2–5.9)
WBC # BLD AUTO: 8.1 K/UL (ref 4–11)

## 2024-06-09 ENCOUNTER — PATIENT MESSAGE (OUTPATIENT)
Dept: INTERNAL MEDICINE CLINIC | Age: 69
End: 2024-06-09

## 2024-06-10 NOTE — TELEPHONE ENCOUNTER
From: Parker Swain Jr.  To: Dr. Elvia Don  Sent: 6/9/2024 9:01 PM EDT  Subject: Trulicity    I haven't taken it for a while now. [facility] in [facility] has Mounjaro 5mg. Can we switch me over. Phone number [phone] Thank you, Ruel Swain

## 2024-06-24 NOTE — TELEPHONE ENCOUNTER
Last OV: 4/3/2024  Next OV: 8/21/2024    Next appointment due:    Last fill:1/8/24, 7/24/23  Refills:1, 3

## 2024-06-25 ENCOUNTER — PATIENT MESSAGE (OUTPATIENT)
Dept: INTERNAL MEDICINE CLINIC | Age: 69
End: 2024-06-25

## 2024-06-25 RX ORDER — CARVEDILOL 6.25 MG/1
6.25 TABLET ORAL 2 TIMES DAILY
Qty: 180 TABLET | Refills: 1 | Status: SHIPPED | OUTPATIENT
Start: 2024-06-25

## 2024-06-25 RX ORDER — METFORMIN HYDROCHLORIDE 750 MG/1
TABLET, EXTENDED RELEASE ORAL
Qty: 180 TABLET | Refills: 3 | Status: SHIPPED | OUTPATIENT
Start: 2024-06-25

## 2024-06-25 NOTE — TELEPHONE ENCOUNTER
From: Parker Swain Jr.  To: Dr. Elvia Don  Sent: 6/25/2024 2:00 PM EDT  Subject: Mounjaro 2.5mg    Could you call [facility] for a 3 month supply at this dose for right now? Thanks Ruel

## 2024-07-08 RX ORDER — RANOLAZINE 500 MG/1
TABLET, EXTENDED RELEASE ORAL
Qty: 180 TABLET | Refills: 1 | Status: SHIPPED | OUTPATIENT
Start: 2024-07-08

## 2024-07-12 RX ORDER — ROSUVASTATIN CALCIUM 20 MG/1
20 TABLET, COATED ORAL DAILY
Qty: 90 TABLET | Refills: 3 | Status: SHIPPED | OUTPATIENT
Start: 2024-07-12

## 2024-08-15 RX ORDER — DAPAGLIFLOZIN 10 MG/1
TABLET, FILM COATED ORAL
Qty: 90 TABLET | Refills: 3 | Status: SHIPPED | OUTPATIENT
Start: 2024-08-15

## 2024-08-21 ENCOUNTER — HOSPITAL ENCOUNTER (OUTPATIENT)
Age: 69
Discharge: HOME | End: 2024-08-21
Payer: MEDICARE

## 2024-08-21 ENCOUNTER — HOSPITAL ENCOUNTER (OUTPATIENT)
Dept: GENERAL RADIOLOGY | Age: 69
Discharge: HOME | End: 2024-08-21
Payer: MEDICARE

## 2024-08-21 DIAGNOSIS — N20.0 CALCULUS, KIDNEY: ICD-10-CM

## 2024-08-21 PROCEDURE — 74018 RADEX ABDOMEN 1 VIEW: CPT

## 2024-08-24 PROBLEM — R79.89 ABNORMAL CBC: Status: RESOLVED | Noted: 2023-12-02 | Resolved: 2024-08-24

## 2024-08-24 NOTE — PROGRESS NOTES
Patient: Parker Swain Jr. is a 69 y.o. male who presents today with the following Chief Complaint(s):  Chief Complaint   Patient presents with    Check-Up     4-5 mth allyssa       HPI    Here today for follow up.     Is scheduled for eye surgery 9/12 and 9/19 with Dr. Ramachandran at [facility]. Surgeries will be done at [facility]. B/L Cataracts.  - no problems with previous anesthesia.  - will have twilight anesthesia. Has tolerated well in the past.     DM- was doing well with sugars but had a toe injection 2 days ago and sugars increased. Wondering if he should take glipizide while holding Mounjaro. Remains on Farxiga and metformin.     Memory is stable to mildly progressive. Recommended word puzzles, etc which he is already doing.     CAD- stable. No CP,    MARCELLUS- doing well with Inspire.     HLD- remains on Crestor 20 mg qd. No side effects.     Had kidney stone 2 days ago. Saw urology earlier today.     Is taking B-12, no D. Wondering what his B-12 level is running.     Labs 8/14/24:  HbA1c 6.4%                  Allergies   Allergen Reactions    Influenza Vaccines Rash    Lisinopril Rash      Past Medical History:   Diagnosis Date    Acid reflux     CAD (coronary artery disease)     COVID 12/28/2022    Hyperlipidemia     Hypertension     MI (myocardial infarction) ([facility]) 2014    Obstructive sleep apnea syndrome 05/18/2021    Type 2 diabetes mellitus without complication ([facility])       Past Surgical History:   Procedure Laterality Date    CARPAL TUNNEL RELEASE Left 2022    COLONOSCOPY      CYSTOSCOPY Left 07/06/2023    CYSTOSCOPY WITH LEFT STENT REMOVAL WITH LEFT URETEROSCOPY WITH HOLMIUM LASER LITHOTRIPSY STONE MANIPULATION STONE BASKET LEFT STENT PLACEMENT-[facility] #[id number] performed by Mamadou Saldivar MD at [facility] OR    FOOT SURGERY Right 11/05/2020    fracture    LARYNGOSCOPY N/A 09/13/2022    DRUG INDUCED SLEEP ENDOSCOPY performed by Becca Smith DO at [facility] OR    LITHOTRIPSY  06/2023    PTCA  2104     pressure.  Continue with Josh 50 mg daily and carvedilol 6.25 mg daily.  Creatinine stable at 1.1         Relevant Orders    CBC with Auto Differential    Hyperlipidemia LDL goal <70      Well-controlled with LDL of 63.  Continue Crestor 20 mg daily.  Stopping Crestor did not improve patient's cognition.         Relevant Orders    TSH with Reflex    Obstructive sleep apnea syndrome     Stable.  Continues to do well with Inspire and nocturnal oxygen. Follows with Dr. Cabral.         Type 2 diabetes mellitus with microalbuminuria, without long-term current use of insulin ([facility]) - Primary      Well-controlled with hemoglobin A1c of 6.2%.  Continue Mounjaro 2.5 mg weekly, metformin ER 1500 mg daily, Farxiga 10 mg daily.           Relevant Orders    Comprehensive Metabolic Panel    Hemoglobin A1C    Microalbumin / Creatinine Urine Ratio    HM DIABETES FOOT EXAM (Completed)     Other Visit Diagnoses       Prostate cancer screening        Medication management        Relevant Orders    Vitamin B12 & Folate    Magnesium    CBC with Auto Differential    Vitamin D insufficiency        Relevant Orders    Vitamin D 25 Hydroxy            Current Outpatient Medications   Medication Sig Dispense Refill    FARXIGA 10 MG tablet TAKE 1 TABLET BY MOUTH EVERY MORNING 90 tablet 3    rosuvastatin (CRESTOR) 20 MG tablet TAKE 1 TABLET BY MOUTH DAILY 90 tablet 3    ranolazine (RANEXA) 500 MG extended release tablet TAKE 1 TABLET BY MOUTH TWICE DAILY 180 tablet 1    metFORMIN (GLUCOPHAGE-XR) 750 MG extended release tablet TAKE 2 TABLETS BY MOUTH DAILY WITH BREAKFAST 180 tablet 3    carvedilol (COREG) 6.25 MG tablet TAKE 1 TABLET BY MOUTH TWICE DAILY 180 tablet 1    Tirzepatide (MOUNJARO) 2.5 MG/0.5ML SOPN SC injection Inject 0.5 mLs into the skin once a week 4 each 2    blood glucose test strips (ACCU-CHEK GUIDE) strip TEST BLOOD GLUCOSE TWICE DAILY AND AS NEEDED FOR SYMPTOMS OF IRREGULAR BLOOD GLUCOSE 300 strip 1    vitamin B-12

## 2024-08-30 ENCOUNTER — TELEPHONE (OUTPATIENT)
Dept: INTERNAL MEDICINE CLINIC | Age: 69
End: 2024-08-30

## 2024-08-30 ENCOUNTER — OFFICE VISIT (OUTPATIENT)
Dept: INTERNAL MEDICINE CLINIC | Age: 69
End: 2024-08-30
Payer: MEDICARE

## 2024-08-30 VITALS — BODY MASS INDEX: 29.97 KG/M2 | WEIGHT: 239.8 LBS

## 2024-08-30 DIAGNOSIS — E78.5 HYPERLIPIDEMIA LDL GOAL <70: ICD-10-CM

## 2024-08-30 DIAGNOSIS — G47.33 OBSTRUCTIVE SLEEP APNEA SYNDROME: ICD-10-CM

## 2024-08-30 DIAGNOSIS — R41.89 COGNITIVE IMPAIRMENT: ICD-10-CM

## 2024-08-30 DIAGNOSIS — E11.29 TYPE 2 DIABETES MELLITUS WITH MICROALBUMINURIA, WITHOUT LONG-TERM CURRENT USE OF INSULIN (HCC): Primary | ICD-10-CM

## 2024-08-30 DIAGNOSIS — Z79.899 MEDICATION MANAGEMENT: ICD-10-CM

## 2024-08-30 DIAGNOSIS — Z12.5 PROSTATE CANCER SCREENING: ICD-10-CM

## 2024-08-30 DIAGNOSIS — R80.9 TYPE 2 DIABETES MELLITUS WITH MICROALBUMINURIA, WITHOUT LONG-TERM CURRENT USE OF INSULIN (HCC): Primary | ICD-10-CM

## 2024-08-30 DIAGNOSIS — J44.9 COPD, MODERATE (HCC): ICD-10-CM

## 2024-08-30 DIAGNOSIS — E55.9 VITAMIN D INSUFFICIENCY: ICD-10-CM

## 2024-08-30 DIAGNOSIS — I25.10 CORONARY ARTERY DISEASE INVOLVING NATIVE CORONARY ARTERY OF NATIVE HEART WITHOUT ANGINA PECTORIS: Chronic | ICD-10-CM

## 2024-08-30 DIAGNOSIS — I10 ESSENTIAL HYPERTENSION: Chronic | ICD-10-CM

## 2024-08-30 PROCEDURE — G8417 CALC BMI ABV UP PARAM F/U: HCPCS | Performed by: INTERNAL MEDICINE

## 2024-08-30 PROCEDURE — 3046F HEMOGLOBIN A1C LEVEL >9.0%: CPT | Performed by: INTERNAL MEDICINE

## 2024-08-30 PROCEDURE — 3017F COLORECTAL CA SCREEN DOC REV: CPT | Performed by: INTERNAL MEDICINE

## 2024-08-30 PROCEDURE — 1036F TOBACCO NON-USER: CPT | Performed by: INTERNAL MEDICINE

## 2024-08-30 PROCEDURE — 2022F DILAT RTA XM EVC RTNOPTHY: CPT | Performed by: INTERNAL MEDICINE

## 2024-08-30 PROCEDURE — 1123F ACP DISCUSS/DSCN MKR DOCD: CPT | Performed by: INTERNAL MEDICINE

## 2024-08-30 PROCEDURE — G8427 DOCREV CUR MEDS BY ELIG CLIN: HCPCS | Performed by: INTERNAL MEDICINE

## 2024-08-30 PROCEDURE — 3023F SPIROM DOC REV: CPT | Performed by: INTERNAL MEDICINE

## 2024-08-30 PROCEDURE — 99214 OFFICE O/P EST MOD 30 MIN: CPT | Performed by: INTERNAL MEDICINE

## 2024-08-30 NOTE — TELEPHONE ENCOUNTER
I reached out again to [facility] and had them fax over the pre op paperwork. We have received it.   
Terri from [facility] calling in. Pt has an appt today at 4 with  and she wanted to make sure the pre-op form that she sent over in preparation for his appt was received. She is going to fax those papers again now just in case. Pt's appt is not scheduled as a pre-op, it is a regular follow up visit. Terri can be reached at [phone] for any questions but she leaves at 1pm.  
normal...

## 2024-08-31 ASSESSMENT — ENCOUNTER SYMPTOMS
CHEST TIGHTNESS: 0
DIARRHEA: 0
SHORTNESS OF BREATH: 0
CONSTIPATION: 0

## 2024-08-31 NOTE — ASSESSMENT & PLAN NOTE
Well-controlled with LDL of 63.  Continue Crestor 20 mg daily.  Stopping Crestor did not improve patient's cognition.

## 2024-08-31 NOTE — ASSESSMENT & PLAN NOTE
Relatively stable.  Maybe a little bit worse per his wife.  Has established with neurology at .  Underwent neuropsych testing.

## 2024-08-31 NOTE — ASSESSMENT & PLAN NOTE
Stable.  Asymptomatic.  Continue losartan 50 mg daily, carvedilol 6.25 mg twice daily, Ranexa 500 mg twice daily, Crestor 20 mg daily, and aspirin.

## 2024-08-31 NOTE — ASSESSMENT & PLAN NOTE
Stable.  Following with Dr. Cabral for pulmonology.  Has moderate COPD based on PFTs.  Asymptomatic.  Remains albuterol as needed nocturnal oxygen.

## 2024-08-31 NOTE — ASSESSMENT & PLAN NOTE
Well-controlled.  Denies symptoms of low blood pressure.  Continue with Josh 50 mg daily and carvedilol 6.25 mg daily.  Creatinine stable at 1.1

## 2024-08-31 NOTE — ASSESSMENT & PLAN NOTE
Well-controlled with hemoglobin A1c of 6.2%.  Continue Mounjaro 2.5 mg weekly, metformin ER 1500 mg daily, Farxiga 10 mg daily.

## 2024-09-04 DIAGNOSIS — E11.9 TYPE 2 DIABETES MELLITUS WITHOUT COMPLICATION, WITHOUT LONG-TERM CURRENT USE OF INSULIN (HCC): ICD-10-CM

## 2024-09-05 RX ORDER — BLOOD SUGAR DIAGNOSTIC
STRIP MISCELLANEOUS
Qty: 300 STRIP | Refills: 1 | Status: SHIPPED | OUTPATIENT
Start: 2024-09-05

## 2024-09-06 ENCOUNTER — TELEPHONE (OUTPATIENT)
Dept: INTERNAL MEDICINE CLINIC | Age: 69
End: 2024-09-06

## 2024-09-06 NOTE — TELEPHONE ENCOUNTER
Called and spoke with answering service. The patient did have a preop with Dr Don and I have faxed over the office note.

## 2024-09-06 NOTE — TELEPHONE ENCOUNTER
Onelia calling from [facility] [place], states patient is having cataract surgery on 9/12/24. Onelia is asking if patient can be cleared for surgery? He has not had a pre-op for this surgery. Please advise [phone]

## 2024-09-10 ENCOUNTER — TELEPHONE (OUTPATIENT)
Dept: INTERNAL MEDICINE CLINIC | Age: 69
End: 2024-09-10

## 2024-09-10 PROBLEM — Z01.818 PREOP EXAM FOR INTERNAL MEDICINE: Status: ACTIVE | Noted: 2024-09-10

## 2024-10-10 PROBLEM — Z01.818 PREOP EXAM FOR INTERNAL MEDICINE: Status: RESOLVED | Noted: 2024-09-10 | Resolved: 2024-10-10

## 2024-10-11 ENCOUNTER — TELEPHONE (OUTPATIENT)
Dept: INTERNAL MEDICINE CLINIC | Age: 69
End: 2024-10-11

## 2024-10-11 DIAGNOSIS — J44.9 COPD, MODERATE (HCC): Primary | ICD-10-CM

## 2024-10-11 NOTE — TELEPHONE ENCOUNTER
Asking for pulmonary referral   External   Requesting for Tejas Garcia MD at [facility]   Ph # [phone]  Fax # [phone]

## 2024-10-16 DIAGNOSIS — E11.9 TYPE 2 DIABETES MELLITUS WITHOUT COMPLICATION, WITHOUT LONG-TERM CURRENT USE OF INSULIN (HCC): ICD-10-CM

## 2024-10-16 RX ORDER — LANCETS
EACH MISCELLANEOUS
Qty: 102 EACH | Refills: 5 | Status: SHIPPED | OUTPATIENT
Start: 2024-10-16

## 2024-10-18 RX ORDER — TIRZEPATIDE 2.5 MG/.5ML
INJECTION, SOLUTION SUBCUTANEOUS
Qty: 2 ML | Refills: 1 | Status: SHIPPED | OUTPATIENT
Start: 2024-10-18

## 2024-11-21 ENCOUNTER — PATIENT MESSAGE (OUTPATIENT)
Dept: INTERNAL MEDICINE CLINIC | Age: 69
End: 2024-11-21

## 2024-11-21 DIAGNOSIS — R80.9 TYPE 2 DIABETES MELLITUS WITH MICROALBUMINURIA, WITHOUT LONG-TERM CURRENT USE OF INSULIN (HCC): Primary | ICD-10-CM

## 2024-11-21 DIAGNOSIS — E11.29 TYPE 2 DIABETES MELLITUS WITH MICROALBUMINURIA, WITHOUT LONG-TERM CURRENT USE OF INSULIN (HCC): Primary | ICD-10-CM

## 2024-11-21 RX ORDER — TIRZEPATIDE 5 MG/.5ML
5 INJECTION, SOLUTION SUBCUTANEOUS WEEKLY
Qty: 2 ML | Refills: 3 | Status: SHIPPED | OUTPATIENT
Start: 2024-11-21

## 2024-12-02 LAB
ABSOLUTE BANDS: ABNORMAL
ALBUMIN: 4.6 G/DL
ALP BLD-CCNC: 75 U/L
ALT SERPL-CCNC: 17 U/L
ANION GAP SERPL CALCULATED.3IONS-SCNC: NORMAL MMOL/L
AST SERPL-CCNC: 14 U/L
BANDS: ABNORMAL
BASOPHILS ABSOLUTE: 0 /ΜL
BASOPHILS RELATIVE PERCENT: ABNORMAL
BILIRUB SERPL-MCNC: 0.4 MG/DL (ref 0.1–1.4)
BUN BLDV-MCNC: 10 MG/DL
CALCIUM SERPL-MCNC: 9.5 MG/DL
CHLORIDE BLD-SCNC: 99 MMOL/L
CO2: 26 MMOL/L
CREAT SERPL-MCNC: 1.2 MG/DL
CREATININE URINE: 103.6 MG/DL
EOSINOPHILS RELATIVE PERCENT: ABNORMAL
ESTIMATED AVERAGE GLUCOSE: NORMAL
FOLATE: 5.1
GFR, ESTIMATED: 65
GLUCOSE BLD-MCNC: 124 MG/DL
HBA1C MFR BLD: 6.5 %
HCT VFR BLD CALC: 41.1 % (ref 41–53)
HEMOGLOBIN: 12.9 G/DL (ref 13.5–17.5)
IMMATURE GRANULOCYTES %: 2.7
IMMATURE GRANULOCYTES ABSOLUTE: 0.2
LYMPHOCYTES # BLD: 19.1 10*3/UL
LYMPHOCYTES ABSOLUTE: 1.5 /ΜL
MAGNESIUM: 1.9 MG/DL
MCH RBC QN AUTO: 29.7 PG
MCHC RBC AUTO-ENTMCNC: 31.4 G/DL
MCV RBC AUTO: 94.7 FL
MICROALBUMIN/CREAT 24H UR: 2480 MG/DL
MICROALBUMIN/CREAT UR-RTO: 24 MG/G
MONOCYTES ABSOLUTE: 0.4 /ΜL
MONOCYTES ABSOLUTE: ABNORMAL
MONOCYTES RELATIVE PERCENT: ABNORMAL
MORPHOLOGY: ABNORMAL
NEUTROPHILS ABSOLUTE: 5.6 /ΜL
NRBC AUTOMATED: ABNORMAL
PDW BLD-RTO: 13.1 %
PLATELET # BLD: 199 K/ΜL
PLATELET ESTIMATE: ABNORMAL
PMV BLD AUTO: 10.8 FL
POTASSIUM SERPL-SCNC: 4.1 MMOL/L
RBC # BLD: 4.34 10^6/ΜL
RBC # BLD: ABNORMAL 10*6/UL
SEG NEUTROPHILS: ABNORMAL
SODIUM BLD-SCNC: 138 MMOL/L
TOTAL PROTEIN: 7 G/DL (ref 6.4–8.2)
TSH SERPL DL<=0.05 MIU/L-ACNC: 0.45 UIU/ML
VITAMIN B-12: 604
VITAMIN D 25-HYDROXY: 59
VITAMIN D2, 25 HYDROXY: NORMAL
VITAMIN D3,25 HYDROXY: NORMAL
WBC # BLD: 7.9 10^3/ML
WBC # BLD: ABNORMAL 10*3/UL

## 2024-12-10 ENCOUNTER — OFFICE VISIT (OUTPATIENT)
Dept: INTERNAL MEDICINE CLINIC | Age: 69
End: 2024-12-10
Payer: MEDICARE

## 2024-12-10 VITALS — SYSTOLIC BLOOD PRESSURE: 96 MMHG | HEART RATE: 72 BPM | OXYGEN SATURATION: 96 % | DIASTOLIC BLOOD PRESSURE: 80 MMHG

## 2024-12-10 VITALS — DIASTOLIC BLOOD PRESSURE: 68 MMHG | SYSTOLIC BLOOD PRESSURE: 112 MMHG

## 2024-12-10 DIAGNOSIS — G47.33 OBSTRUCTIVE SLEEP APNEA SYNDROME: ICD-10-CM

## 2024-12-10 DIAGNOSIS — D64.9 NORMOCYTIC ANEMIA: Primary | ICD-10-CM

## 2024-12-10 DIAGNOSIS — R80.9 TYPE 2 DIABETES MELLITUS WITH MICROALBUMINURIA, WITHOUT LONG-TERM CURRENT USE OF INSULIN (HCC): ICD-10-CM

## 2024-12-10 DIAGNOSIS — R41.89 COGNITIVE IMPAIRMENT: ICD-10-CM

## 2024-12-10 DIAGNOSIS — E78.5 HYPERLIPIDEMIA LDL GOAL <70: ICD-10-CM

## 2024-12-10 DIAGNOSIS — I25.10 CORONARY ARTERY DISEASE INVOLVING NATIVE CORONARY ARTERY OF NATIVE HEART WITHOUT ANGINA PECTORIS: Chronic | ICD-10-CM

## 2024-12-10 DIAGNOSIS — Z00.00 MEDICARE ANNUAL WELLNESS VISIT, SUBSEQUENT: Primary | ICD-10-CM

## 2024-12-10 DIAGNOSIS — M79.605 LEFT LEG PAIN: ICD-10-CM

## 2024-12-10 DIAGNOSIS — E11.29 TYPE 2 DIABETES MELLITUS WITH MICROALBUMINURIA, WITHOUT LONG-TERM CURRENT USE OF INSULIN (HCC): ICD-10-CM

## 2024-12-10 DIAGNOSIS — I10 ESSENTIAL HYPERTENSION: Chronic | ICD-10-CM

## 2024-12-10 DIAGNOSIS — J44.9 COPD, MODERATE (HCC): ICD-10-CM

## 2024-12-10 DIAGNOSIS — R94.31 ABNORMAL EKG: ICD-10-CM

## 2024-12-10 PROCEDURE — 3079F DIAST BP 80-89 MM HG: CPT | Performed by: INTERNAL MEDICINE

## 2024-12-10 PROCEDURE — 3046F HEMOGLOBIN A1C LEVEL >9.0%: CPT | Performed by: INTERNAL MEDICINE

## 2024-12-10 PROCEDURE — G8484 FLU IMMUNIZE NO ADMIN: HCPCS | Performed by: INTERNAL MEDICINE

## 2024-12-10 PROCEDURE — 1123F ACP DISCUSS/DSCN MKR DOCD: CPT | Performed by: INTERNAL MEDICINE

## 2024-12-10 PROCEDURE — G0439 PPPS, SUBSEQ VISIT: HCPCS | Performed by: INTERNAL MEDICINE

## 2024-12-10 PROCEDURE — 3078F DIAST BP <80 MM HG: CPT | Performed by: INTERNAL MEDICINE

## 2024-12-10 PROCEDURE — 3023F SPIROM DOC REV: CPT | Performed by: INTERNAL MEDICINE

## 2024-12-10 PROCEDURE — 2022F DILAT RTA XM EVC RTNOPTHY: CPT | Performed by: INTERNAL MEDICINE

## 2024-12-10 PROCEDURE — 99214 OFFICE O/P EST MOD 30 MIN: CPT | Performed by: INTERNAL MEDICINE

## 2024-12-10 PROCEDURE — G8428 CUR MEDS NOT DOCUMENT: HCPCS | Performed by: INTERNAL MEDICINE

## 2024-12-10 PROCEDURE — 3017F COLORECTAL CA SCREEN DOC REV: CPT | Performed by: INTERNAL MEDICINE

## 2024-12-10 PROCEDURE — 3074F SYST BP LT 130 MM HG: CPT | Performed by: INTERNAL MEDICINE

## 2024-12-10 PROCEDURE — 1036F TOBACCO NON-USER: CPT | Performed by: INTERNAL MEDICINE

## 2024-12-10 PROCEDURE — 1159F MED LIST DOCD IN RCRD: CPT | Performed by: INTERNAL MEDICINE

## 2024-12-10 PROCEDURE — G8417 CALC BMI ABV UP PARAM F/U: HCPCS | Performed by: INTERNAL MEDICINE

## 2024-12-10 ASSESSMENT — ENCOUNTER SYMPTOMS
DIARRHEA: 0
CHEST TIGHTNESS: 0
CONSTIPATION: 0
SHORTNESS OF BREATH: 0

## 2024-12-10 ASSESSMENT — PATIENT HEALTH QUESTIONNAIRE - PHQ9
SUM OF ALL RESPONSES TO PHQ9 QUESTIONS 1 & 2: 0
2. FEELING DOWN, DEPRESSED OR HOPELESS: NOT AT ALL
1. LITTLE INTEREST OR PLEASURE IN DOING THINGS: NOT AT ALL
SUM OF ALL RESPONSES TO PHQ QUESTIONS 1-9: 0

## 2024-12-10 ASSESSMENT — LIFESTYLE VARIABLES
HOW OFTEN DO YOU HAVE A DRINK CONTAINING ALCOHOL: NEVER
HOW MANY STANDARD DRINKS CONTAINING ALCOHOL DO YOU HAVE ON A TYPICAL DAY: PATIENT DOES NOT DRINK

## 2024-12-10 NOTE — PROGRESS NOTES
Medicare Annual Wellness Visit    Parker Swain Jr. is here for Medicare AWV    Assessment & Plan   Medicare annual wellness visit, subsequent  Recommendations for Preventive Services Due: see orders and patient instructions/AVS.  Recommended screening schedule for the next 5-10 years is provided to the patient in written form: see Patient Instructions/AVS.     No follow-ups on file.     Subjective     Discussed  Care Gaps with the patient during his visit. Patient did have his colonoscopy done this year. He was not sure where he had it done. He will call with the information. Patient also had his diabetic eye exam. He is going to have the paperwork faxed over.     Patient's complete Health Risk Assessment and screening values have been reviewed and are found in Flowsheets. The following problems were reviewed today and where indicated follow up appointments were made and/or referrals ordered.    Positive Risk Factor Screenings with Interventions:    Fall Risk:  Do you feel unsteady or are you worried about falling? : (!) yes  2 or more falls in past year?: no  Fall with injury in past year?: no     Interventions:    Reviewed medications, home hazards, visual acuity, and co-morbidities that can increase risk for falls  Patient is going to speak with provider today regarding his leg pain.              Inactivity:  On average, how many days per week do you engage in moderate to strenuous exercise (like a brisk walk)?: 1 day (!) Abnormal  On average, how many minutes do you engage in exercise at this level?: 20 min  Interventions:  Patient declined any further interventions or treatment                         Objective   Vitals:    12/10/24 1536   BP: 96/80   Pulse: 72   SpO2: 96%      There is no height or weight on file to calculate BMI.        Wt Readings from Last 3 Encounters:   08/30/24 108.8 kg (239 lb 12.8 oz)   04/15/24 108.4 kg (239 lb)   04/03/24 110.7 kg (244 lb)     Temp Readings from Last 3

## 2024-12-10 NOTE — PROGRESS NOTES
Patient: Parker Swain Jr. is a 69 y.o. male who presents today with the following Chief Complaint(s):  Chief Complaint   Patient presents with    Follow-up       HPI    Here today for follow up.     BP was 96/80 at [facility] prior to this appointment. Feeling well. Denies lightheadedness.     States that he is doing ok except for his left leg.   - lateral thigh down to foot.   - achy pain  - no back pain.   - does lack strength- could not stand from a squatting position.   - hurts worse after sitting for awhile then standing.   - on and off but has been daily for the past several months.   - no injury.   - no h/o back problems.   - no groin pain.  - pain is worse if he lays on his left side at night.     Memory is about the same.     DM- sugars are starting to come down. Had to hold Mounjaro for 1 month d/t cataract surgery.   - taking Mounjaro 5 mg weekly. No issues getting Mounjaro. No side effects.     COPD- doing ok. Due for lung scan in January.     HTN- no issues. Does not feel light headed.     HLD- no issues.     CAD- asymptomatic.     Did have 1 kidney stone recently.     Labs 12/2/24 at [facility]:  CMP: Na 139, K 4.1, BUN 10, Cre 1.2 (1.2 8/24), Glu 124, LFT normal. Mg 1.9  Microalbumin elevated.   HbA1c 6.5% (6.4% 8/24)  B-12 604, Folate 5.1  Vit D 59  TSH 0.447  CBC: WBC 7.9, H/H 12.9/41.1, Plt 199   Hgb 13.8 2/24.        Allergies   Allergen Reactions    Influenza Vaccines Rash    Lisinopril Rash      Past Medical History:   Diagnosis Date    Acid reflux     CAD (coronary artery disease)     COVID 12/28/2022    Hyperlipidemia     Hypertension     MI (myocardial infarction) ([facility]) 2014    Obstructive sleep apnea syndrome 05/18/2021    Type 2 diabetes mellitus without complication ([facility])       Past Surgical History:   Procedure Laterality Date    CARPAL TUNNEL RELEASE Left 2022    COLONOSCOPY      CYSTOSCOPY Left 07/06/2023    CYSTOSCOPY WITH LEFT STENT REMOVAL WITH LEFT URETEROSCOPY WITH HOLMIUM LASER

## 2024-12-10 NOTE — PATIENT INSTRUCTIONS
doctor about stop-smoking programs and medicines. These can increase your chances of quitting for good. Quitting is one of the most important things you can do to protect your heart. It is never too late to quit. Try to avoid secondhand smoke too.     Stay at a weight that's healthy for you. Talk to your doctor if you need help losing weight.     Try to get 7 to 9 hours of sleep each night.     Limit alcohol to 2 drinks a day for men and 1 drink a day for women. Too much alcohol can cause health problems.     Manage other health problems such as diabetes, high blood pressure, and high cholesterol. If you think you may have a problem with alcohol or drug use, talk to your doctor.   Medicines    Take your medicines exactly as prescribed. Call your doctor if you think you are having a problem with your medicine.     If your doctor recommends aspirin, take the amount directed each day. Make sure you take aspirin and not another kind of pain reliever, such as acetaminophen (Tylenol).   When should you call for help?   Call 911 if you have symptoms of a heart attack. These may include:    Chest pain or pressure, or a strange feeling in the chest.     Sweating.     Shortness of breath.     Pain, pressure, or a strange feeling in the back, neck, jaw, or upper belly or in one or both shoulders or arms.     Lightheadedness or sudden weakness.     A fast or irregular heartbeat.   After you call 911, the  may tell you to chew 1 adult-strength or 2 to 4 low-dose aspirin. Wait for an ambulance. Do not try to drive yourself.  Watch closely for changes in your health, and be sure to contact your doctor if you have any problems.  Where can you learn more?  Go to https://www.[employer].net/patientEd and enter F075 to learn more about \"A Healthy Heart: Care Instructions.\"  Current as of: June 24, 2023  Content Version: 14.2  © 2024 [employer].   Care instructions adapted under license by [employer]. If you have

## 2024-12-11 PROBLEM — M79.605 LEFT LEG PAIN: Status: ACTIVE | Noted: 2024-12-11

## 2024-12-11 NOTE — ASSESSMENT & PLAN NOTE
Stable.  Asymptomatic.  Continue losartan 25 mg daily, carvedilol 6.25 mg twice daily, Ranexa 500 mg twice daily, Crestor 20 mg daily, and aspirin.

## 2024-12-11 NOTE — ASSESSMENT & PLAN NOTE
Well-controlled.  Continue with losartan 25 mg daily and carvedilol 6.25 mg daily.  Creatinine stable at 1.1

## 2024-12-11 NOTE — ASSESSMENT & PLAN NOTE
Well-controlled with LDL of 63 in August 2024.  Continue Crestor 20 mg daily.  Stopping Crestor did not improve patient's cognition.

## 2024-12-11 NOTE — ASSESSMENT & PLAN NOTE
Patient underwent physical therapy evaluation in her office and has been given home exercises.  If he does not improve with home exercises he will reach out for a formal referral to physical therapy.

## 2024-12-11 NOTE — ASSESSMENT & PLAN NOTE
Well-controlled with hemoglobin A1c of 6.5%.  Continue Mounjaro 5 mg weekly, metformin ER 1500 mg daily, Farxiga 10 mg daily.

## 2024-12-12 ENCOUNTER — TELEPHONE (OUTPATIENT)
Dept: CASE MANAGEMENT | Age: 69
End: 2024-12-12

## 2024-12-12 DIAGNOSIS — Z72.0 TOBACCO USE: Primary | ICD-10-CM

## 2024-12-12 NOTE — TELEPHONE ENCOUNTER
Elvia Grayson Dr., DO,    This is a friendly reminder that your patient is due for their annual lung screen on 1/3/25.  For your convenience, I have pended the order for the scan.  If you do not agree with the need for the test, please cancel the order and let me know.      Patient will be mailed reminder letter to schedule.      Thank you,     Tiara Keane  Lung Navigator  [facility]  [phone]    Future Appointments   Date Time Provider Department Center   4/25/2025  3:00 PM Elvia Don DO [facility] DEP       Last PCP Appt: 12/10/24     Lung Screen Criteria  Age 50-80  Current smoker or quit within the last 15 years  Has => 20 pack year history.

## 2024-12-16 RX ORDER — CARVEDILOL 6.25 MG/1
6.25 TABLET ORAL 2 TIMES DAILY
Qty: 180 TABLET | Refills: 1 | Status: SHIPPED | OUTPATIENT
Start: 2024-12-16

## 2024-12-16 RX ORDER — RANOLAZINE 500 MG/1
TABLET, EXTENDED RELEASE ORAL
Qty: 180 TABLET | Refills: 1 | Status: SHIPPED | OUTPATIENT
Start: 2024-12-16

## 2024-12-18 RX ORDER — LOSARTAN POTASSIUM 25 MG/1
25 TABLET ORAL DAILY
Qty: 90 TABLET | Refills: 3 | Status: SHIPPED | OUTPATIENT
Start: 2024-12-18

## 2024-12-27 DIAGNOSIS — D64.9 NORMOCYTIC ANEMIA: ICD-10-CM

## 2024-12-27 LAB
CONTROL: NORMAL
FECAL BLOOD IMMUNOCHEMICAL TEST: NEGATIVE

## 2024-12-27 PROCEDURE — 82274 ASSAY TEST FOR BLOOD FECAL: CPT | Performed by: INTERNAL MEDICINE

## 2025-01-10 DIAGNOSIS — R93.89 ABNORMAL CHEST CT: Primary | ICD-10-CM

## 2025-01-30 ENCOUNTER — PATIENT MESSAGE (OUTPATIENT)
Dept: INTERNAL MEDICINE CLINIC | Age: 70
End: 2025-01-30

## 2025-01-30 DIAGNOSIS — T46.6X5A MYALGIA DUE TO STATIN: ICD-10-CM

## 2025-01-30 DIAGNOSIS — M79.10 MYALGIA DUE TO STATIN: ICD-10-CM

## 2025-01-30 DIAGNOSIS — I25.10 CORONARY ARTERY DISEASE INVOLVING NATIVE CORONARY ARTERY OF NATIVE HEART WITHOUT ANGINA PECTORIS: Primary | Chronic | ICD-10-CM

## 2025-01-30 DIAGNOSIS — R80.9 TYPE 2 DIABETES MELLITUS WITH MICROALBUMINURIA, WITHOUT LONG-TERM CURRENT USE OF INSULIN (HCC): ICD-10-CM

## 2025-01-30 DIAGNOSIS — E11.29 TYPE 2 DIABETES MELLITUS WITH MICROALBUMINURIA, WITHOUT LONG-TERM CURRENT USE OF INSULIN (HCC): ICD-10-CM

## 2025-01-30 DIAGNOSIS — E78.5 HYPERLIPIDEMIA LDL GOAL <70: ICD-10-CM

## 2025-01-31 RX ORDER — PITAVASTATIN CALCIUM 4.18 MG/1
2 TABLET, FILM COATED ORAL NIGHTLY
Qty: 30 TABLET | Refills: 3 | Status: SHIPPED | OUTPATIENT
Start: 2025-01-31

## 2025-02-04 DIAGNOSIS — E11.9 TYPE 2 DIABETES MELLITUS WITHOUT COMPLICATION, WITHOUT LONG-TERM CURRENT USE OF INSULIN (HCC): ICD-10-CM

## 2025-02-04 RX ORDER — BLOOD SUGAR DIAGNOSTIC
STRIP MISCELLANEOUS
Qty: 300 STRIP | Refills: 1 | Status: SHIPPED | OUTPATIENT
Start: 2025-02-04

## 2025-02-23 DIAGNOSIS — R80.9 TYPE 2 DIABETES MELLITUS WITH MICROALBUMINURIA, WITHOUT LONG-TERM CURRENT USE OF INSULIN (HCC): ICD-10-CM

## 2025-02-23 DIAGNOSIS — E11.29 TYPE 2 DIABETES MELLITUS WITH MICROALBUMINURIA, WITHOUT LONG-TERM CURRENT USE OF INSULIN (HCC): ICD-10-CM

## 2025-02-24 RX ORDER — TIRZEPATIDE 5 MG/.5ML
INJECTION, SOLUTION SUBCUTANEOUS
Qty: 2 ML | Refills: 3 | Status: SHIPPED | OUTPATIENT
Start: 2025-02-24

## 2025-02-24 RX ORDER — LOSARTAN POTASSIUM 25 MG/1
25 TABLET ORAL DAILY
Qty: 90 TABLET | Refills: 3 | Status: SHIPPED | OUTPATIENT
Start: 2025-02-24

## 2025-02-24 NOTE — TELEPHONE ENCOUNTER
Received refill request for LOSARTAN 25MG TABLETS  from [facility] pharmacy.     Last OV: 12/10/24    Next OV: 4/15/25    Last Labs: cmp 12/10/24    Last Filled: 12/18/24

## 2025-02-24 NOTE — TELEPHONE ENCOUNTER
Last OV: 12/10/2024  Next OV: 4/25/2025    Next appointment due: Return in about 4 months (around 4/10/2025).     Last fill: 11/21/24  Refills: 3

## 2025-03-17 ENCOUNTER — HOSPITAL ENCOUNTER (OUTPATIENT)
Age: 70
Discharge: HOME | End: 2025-03-17
Payer: MEDICARE

## 2025-03-17 DIAGNOSIS — R93.89 ABNORMAL CHEST CT: ICD-10-CM

## 2025-03-17 PROCEDURE — 71250 CT THORAX DX C-: CPT

## 2025-03-19 ENCOUNTER — RESULTS FOLLOW-UP (OUTPATIENT)
Age: 70
End: 2025-03-19

## 2025-04-22 ENCOUNTER — TELEPHONE (OUTPATIENT)
Dept: INTERNAL MEDICINE CLINIC | Age: 70
End: 2025-04-22

## 2025-04-22 NOTE — TELEPHONE ENCOUNTER
Care Transitions Initial Follow Up Call    Outreach made within 2 business days of discharge: No    Patient: Parker Swain Jr. Patient : 1955   MRN: [id number]  Reason for Admission: 2025  Discharge Date: 2025       Spoke with: wife      Discharge department/facility: [facility]    TCM Interactive Patient Contact:  Was patient able to fill all prescriptions: Yes  Was patient instructed to bring all medications to the follow-up visit: Yes  Is patient taking all medications as directed in the discharge summary? Yes  Does patient understand their discharge instructions: Yes  Does patient have questions or concerns that need addressed prior to 7-14 day follow up office visit: yes - yes      Additional needs identified to be addressed with provider  No needs identified             Scheduled appointment with PCP within 7-14 days    Follow Up  Future Appointments   Date Time Provider Department Center   2025  3:00 PM Elvia Don DO [facility] DE[facility]       Yolanda Cuenca LPN

## 2025-04-25 ENCOUNTER — OFFICE VISIT (OUTPATIENT)
Dept: INTERNAL MEDICINE CLINIC | Age: 70
End: 2025-04-25

## 2025-04-25 VITALS
DIASTOLIC BLOOD PRESSURE: 64 MMHG | OXYGEN SATURATION: 94 % | SYSTOLIC BLOOD PRESSURE: 100 MMHG | WEIGHT: 225 LBS | HEART RATE: 82 BPM | HEIGHT: 75 IN | BODY MASS INDEX: 27.98 KG/M2

## 2025-04-25 DIAGNOSIS — G47.33 OBSTRUCTIVE SLEEP APNEA SYNDROME: ICD-10-CM

## 2025-04-25 DIAGNOSIS — R80.9 TYPE 2 DIABETES MELLITUS WITH MICROALBUMINURIA, WITHOUT LONG-TERM CURRENT USE OF INSULIN (HCC): ICD-10-CM

## 2025-04-25 DIAGNOSIS — I25.10 CORONARY ARTERY DISEASE INVOLVING NATIVE CORONARY ARTERY OF NATIVE HEART WITHOUT ANGINA PECTORIS: Chronic | ICD-10-CM

## 2025-04-25 DIAGNOSIS — E78.5 HYPERLIPIDEMIA LDL GOAL <70: ICD-10-CM

## 2025-04-25 DIAGNOSIS — J44.9 COPD, MODERATE (HCC): ICD-10-CM

## 2025-04-25 DIAGNOSIS — Z09 HOSPITAL DISCHARGE FOLLOW-UP: ICD-10-CM

## 2025-04-25 DIAGNOSIS — C34.91 NON-SMALL CELL CANCER OF RIGHT LUNG (HCC): Primary | ICD-10-CM

## 2025-04-25 DIAGNOSIS — R41.89 COGNITIVE IMPAIRMENT: ICD-10-CM

## 2025-04-25 DIAGNOSIS — E11.29 TYPE 2 DIABETES MELLITUS WITH MICROALBUMINURIA, WITHOUT LONG-TERM CURRENT USE OF INSULIN (HCC): ICD-10-CM

## 2025-04-25 DIAGNOSIS — I10 ESSENTIAL HYPERTENSION: Chronic | ICD-10-CM

## 2025-04-25 LAB — HBA1C MFR BLD: 5.9 %

## 2025-04-25 SDOH — ECONOMIC STABILITY: FOOD INSECURITY: WITHIN THE PAST 12 MONTHS, THE FOOD YOU BOUGHT JUST DIDN'T LAST AND YOU DIDN'T HAVE MONEY TO GET MORE.: NEVER TRUE

## 2025-04-25 SDOH — ECONOMIC STABILITY: FOOD INSECURITY: WITHIN THE PAST 12 MONTHS, YOU WORRIED THAT YOUR FOOD WOULD RUN OUT BEFORE YOU GOT MONEY TO BUY MORE.: NEVER TRUE

## 2025-04-25 ASSESSMENT — PATIENT HEALTH QUESTIONNAIRE - PHQ9: DEPRESSION UNABLE TO ASSESS: URGENT/EMERGENT SITUATION

## 2025-04-25 NOTE — PROGRESS NOTES
Post-Discharge Transitional Care Management Progress Note      Parker Swain Jr.   YOB: 1955    Date of Office Visit:  4/25/2025  Date of Hospital Admission: 4/8/25  Date of Hospital Discharge: 4/19/25    Care management risk score Rising risk (score 2-5) and Complex Care (Scores >=6): No Risk Score On File     Non face to face  following discharge, date last encounter closed (first attempt may have been earlier): 04/22/2025 04/22/2025    Call initiated 2 business days of discharge: yes (called 4/22/25- see chart notes- discharge date was SATURDAY 4/19/25, call was made on TUESDAY 4/22/25).     ASSESSMENT/PLAN:   Non-small cell cancer of right lung (HCC)  Assessment & Plan:   Per Dr. Genao's note, pathology is consistent with adenocarcinoma.  Underwent complete wedge resection of 2.5 cm right upper lobe mass with clear margins on 4/15/2025.  He is scheduled to see  for oncology next week to discuss further treatment plans.  Does have a few small nodules in his right and left lungs but these are not felt to be malignant.  Cognitive impairment  Assessment & Plan:   Relatively stable.  Has established with neurology at .  Underwent neuropsych testing.  Did struggle with delirium during hospital course but is back to his baseline cognitive function.  Orders:  -     Vitamin B12 & Folate; Future  COPD, moderate (HCC)  Assessment & Plan:   Stable.  Following with Dr. Cabral for pulmonology.  Has moderate COPD based on PFTs.  Asymptomatic.  Remains albuterol as needed nocturnal oxygen.   Essential hypertension  Assessment & Plan:   Blood pressure is actually running low today.  Advised patient and his wife to monitor blood pressure at home and may consider discontinuing losartan if blood pressure remains low.  For now, we will continue losartan 25 mg daily and carvedilol 6.25 mg twice daily.  Creatinine was 0.9 on day of discharge.  Hyperlipidemia LDL goal <70  Assessment & Plan:   Continue

## 2025-04-27 PROBLEM — Z09 HOSPITAL DISCHARGE FOLLOW-UP: Status: ACTIVE | Noted: 2025-04-27

## 2025-04-27 PROBLEM — C34.91 NON-SMALL CELL CANCER OF RIGHT LUNG (HCC): Status: ACTIVE | Noted: 2025-04-27

## 2025-05-27 PROBLEM — Z09 HOSPITAL DISCHARGE FOLLOW-UP: Status: RESOLVED | Noted: 2025-04-27 | Resolved: 2025-05-27

## 2025-05-30 RX ORDER — OMEPRAZOLE 20 MG/1
20 CAPSULE, DELAYED RELEASE ORAL DAILY
Qty: 90 CAPSULE | Refills: 1 | Status: SHIPPED | OUTPATIENT
Start: 2025-05-30

## 2025-06-03 ENCOUNTER — PATIENT MESSAGE (OUTPATIENT)
Dept: INTERNAL MEDICINE CLINIC | Age: 70
End: 2025-06-03

## 2025-06-03 DIAGNOSIS — E11.29 TYPE 2 DIABETES MELLITUS WITH MICROALBUMINURIA, WITHOUT LONG-TERM CURRENT USE OF INSULIN (HCC): Primary | ICD-10-CM

## 2025-06-03 DIAGNOSIS — R80.9 TYPE 2 DIABETES MELLITUS WITH MICROALBUMINURIA, WITHOUT LONG-TERM CURRENT USE OF INSULIN (HCC): Primary | ICD-10-CM

## 2025-06-03 NOTE — TELEPHONE ENCOUNTER
Spoke with patient and patients wife to let them know that at the April appointment Dr. Don had to discussed with the patient about stopping the medication at this time. Patient has not had the medication. He did have his port put in yesterday. Patient is asking if he could go back on Mounjaro 5 mg. Patients surgery is running 175 fasting. Patient is going to start Dexamethasone tomorrow. He will take 4 mg 2 tablets the day before chemo and 4 mg 2 tables the day after chemo every 21 days.

## 2025-06-09 ENCOUNTER — OFFICE VISIT (OUTPATIENT)
Dept: CARDIOLOGY CLINIC | Age: 70
End: 2025-06-09
Payer: MEDICARE

## 2025-06-09 VITALS
DIASTOLIC BLOOD PRESSURE: 62 MMHG | HEIGHT: 75 IN | WEIGHT: 230.1 LBS | OXYGEN SATURATION: 94 % | HEART RATE: 89 BPM | SYSTOLIC BLOOD PRESSURE: 120 MMHG | BODY MASS INDEX: 28.61 KG/M2

## 2025-06-09 DIAGNOSIS — I25.10 CORONARY ARTERY DISEASE DUE TO LIPID RICH PLAQUE: Primary | ICD-10-CM

## 2025-06-09 DIAGNOSIS — I10 ESSENTIAL HYPERTENSION: Chronic | ICD-10-CM

## 2025-06-09 DIAGNOSIS — I25.83 CORONARY ARTERY DISEASE DUE TO LIPID RICH PLAQUE: Primary | ICD-10-CM

## 2025-06-09 DIAGNOSIS — E78.5 HYPERLIPIDEMIA LDL GOAL <70: ICD-10-CM

## 2025-06-09 PROCEDURE — 3078F DIAST BP <80 MM HG: CPT | Performed by: NURSE PRACTITIONER

## 2025-06-09 PROCEDURE — G8417 CALC BMI ABV UP PARAM F/U: HCPCS | Performed by: NURSE PRACTITIONER

## 2025-06-09 PROCEDURE — 1159F MED LIST DOCD IN RCRD: CPT | Performed by: NURSE PRACTITIONER

## 2025-06-09 PROCEDURE — 1160F RVW MEDS BY RX/DR IN RCRD: CPT | Performed by: NURSE PRACTITIONER

## 2025-06-09 PROCEDURE — 3074F SYST BP LT 130 MM HG: CPT | Performed by: NURSE PRACTITIONER

## 2025-06-09 PROCEDURE — G8427 DOCREV CUR MEDS BY ELIG CLIN: HCPCS | Performed by: NURSE PRACTITIONER

## 2025-06-09 PROCEDURE — 3017F COLORECTAL CA SCREEN DOC REV: CPT | Performed by: NURSE PRACTITIONER

## 2025-06-09 PROCEDURE — 99214 OFFICE O/P EST MOD 30 MIN: CPT | Performed by: NURSE PRACTITIONER

## 2025-06-09 PROCEDURE — 1123F ACP DISCUSS/DSCN MKR DOCD: CPT | Performed by: NURSE PRACTITIONER

## 2025-06-09 PROCEDURE — 1036F TOBACCO NON-USER: CPT | Performed by: NURSE PRACTITIONER

## 2025-06-09 RX ORDER — CHOLECALCIFEROL (VITAMIN D3) 10 MCG (400 UNIT) TABLET
125 DAILY
COMMUNITY
Start: 2025-04-09

## 2025-06-09 RX ORDER — FOLIC ACID 1 MG/1
1 TABLET ORAL DAILY
COMMUNITY

## 2025-06-09 NOTE — PROGRESS NOTES
[facility]     Outpatient Follow Up Note    Parker Swain Jr. is 70 y.o. male who presents today with a history of NSTEMI / CAD s/p PTCA OM )ostial CX with mod disease, not significant by FFR '12, prox-LAD ectasia / diffuse disease not amenable to PCI; HTN and hyperlipidemia.      Interval hx:  : R VATS, minithoracotomy, wedge excision RUL mass      ~Plan is to proceed with 4 cycles of adjuvant chemotherapy with carbo Alimta followed by 1 year of adjuvant immunotherapy with intermittent imaging studies.     CHIEF COMPLAINT / HPI:  Follow Up secondary to coronary artery disease    Subjective:     He has no chest discomfort.  There is no SOB/QUINTERO. The patient denies orthopnea/PND. He has an Inspire in addition to 2L O2 NC. His SaPO2 runs ~ 95%. The patient does not have swelling. The patients weight is stable . The patient is not experiencing palpitations or dizziness.   His BP is normal    These symptoms show no change since the last OV.   With regard to medication therapy the patient has been compliant with prescribed regimen. They have tolerated therapy to date.     Past Medical History:   Diagnosis Date    Acid reflux     CAD (coronary artery disease)     COVID 2022    Hyperlipidemia     Hypertension     MI (myocardial infarction) ([facility])     Obstructive sleep apnea syndrome 2021    Type 2 diabetes mellitus without complication ([facility])      Social History:    Social History     Tobacco Use   Smoking Status Former    Current packs/day: 0.00    Average packs/day: 1 pack/day for 48.4 years (48.4 ttl pk-yrs)    Types: Cigarettes    Start date:     Quit date: 2020    Years since quittin.0   Smokeless Tobacco Never     Current Medications:  Current Outpatient Medications   Medication Sig Dispense Refill    cholecalciferol (DELTA D3) 400 UNIT TABS tablet Take 12.5 tablets by mouth daily      Tirzepatide (MOUNJARO) 2.5 MG/0.5ML SOAJ pen Inject 2.5 mg into the skin every 7 days

## 2025-06-11 RX ORDER — CARVEDILOL 6.25 MG/1
6.25 TABLET ORAL 2 TIMES DAILY
Qty: 180 TABLET | Refills: 1 | Status: SHIPPED | OUTPATIENT
Start: 2025-06-11

## 2025-06-11 RX ORDER — METFORMIN HYDROCHLORIDE 750 MG/1
1500 TABLET, EXTENDED RELEASE ORAL
Qty: 180 TABLET | Refills: 3 | Status: SHIPPED | OUTPATIENT
Start: 2025-06-11

## 2025-06-11 RX ORDER — RANOLAZINE 500 MG/1
500 TABLET, EXTENDED RELEASE ORAL 2 TIMES DAILY
Qty: 180 TABLET | Refills: 1 | Status: SHIPPED | OUTPATIENT
Start: 2025-06-11

## 2025-06-11 NOTE — TELEPHONE ENCOUNTER
Medication:   Requested Prescriptions     Pending Prescriptions Disp Refills    metFORMIN (GLUCOPHAGE-XR) 750 MG extended release tablet [Pharmacy Med Name: METFORMIN ER 750MG 24HR TABS] 180 tablet 3     Sig: TAKE 2 TABLETS BY MOUTH DAILY WITH BREAKFAST    ranolazine (RANEXA) 500 MG extended release tablet [Pharmacy Med Name: RANOLAZINE 500MG ER TABLETS] 180 tablet 1     Sig: TAKE 1 TABLET BY MOUTH TWICE DAILY    carvedilol (COREG) 6.25 MG tablet [Pharmacy Med Name: CARVEDILOL 6.25MG TABLETS] 180 tablet 1     Sig: TAKE 1 TABLET BY MOUTH TWICE DAILY        Last Filled:      Patient Phone Number: [phone] (home)     Last appt: 4/25/2025   Next appt: 8/18/2025    Last OARRS:        No data to display

## 2025-06-17 ENCOUNTER — PATIENT MESSAGE (OUTPATIENT)
Dept: INTERNAL MEDICINE CLINIC | Age: 70
End: 2025-06-17

## 2025-06-17 RX ORDER — INSULIN LISPRO 100 [IU]/ML
10-20 INJECTION, SOLUTION INTRAVENOUS; SUBCUTANEOUS 3 TIMES DAILY PRN
Qty: 5 ADJUSTABLE DOSE PRE-FILLED PEN SYRINGE | Refills: 2 | Status: SHIPPED | OUTPATIENT
Start: 2025-06-17

## 2025-07-01 ENCOUNTER — TELEPHONE (OUTPATIENT)
Dept: CARDIOLOGY CLINIC | Age: 70
End: 2025-07-01

## 2025-07-01 RX ORDER — TIRZEPATIDE 5 MG/.5ML
INJECTION, SOLUTION SUBCUTANEOUS
Qty: 2 ML | OUTPATIENT
Start: 2025-07-01

## 2025-07-05 ENCOUNTER — PATIENT MESSAGE (OUTPATIENT)
Dept: INTERNAL MEDICINE CLINIC | Age: 70
End: 2025-07-05

## 2025-07-05 DIAGNOSIS — R80.9 TYPE 2 DIABETES MELLITUS WITH MICROALBUMINURIA, WITHOUT LONG-TERM CURRENT USE OF INSULIN (HCC): ICD-10-CM

## 2025-07-05 DIAGNOSIS — E11.29 TYPE 2 DIABETES MELLITUS WITH MICROALBUMINURIA, WITHOUT LONG-TERM CURRENT USE OF INSULIN (HCC): ICD-10-CM

## 2025-08-18 ENCOUNTER — OFFICE VISIT (OUTPATIENT)
Dept: INTERNAL MEDICINE CLINIC | Age: 70
End: 2025-08-18

## 2025-08-18 VITALS
BODY MASS INDEX: 28.4 KG/M2 | DIASTOLIC BLOOD PRESSURE: 60 MMHG | HEART RATE: 87 BPM | HEIGHT: 75 IN | WEIGHT: 228.4 LBS | SYSTOLIC BLOOD PRESSURE: 110 MMHG | OXYGEN SATURATION: 96 %

## 2025-08-18 VITALS — DIASTOLIC BLOOD PRESSURE: 60 MMHG | SYSTOLIC BLOOD PRESSURE: 110 MMHG

## 2025-08-18 DIAGNOSIS — I25.10 CORONARY ARTERY DISEASE INVOLVING NATIVE CORONARY ARTERY OF NATIVE HEART WITHOUT ANGINA PECTORIS: Chronic | ICD-10-CM

## 2025-08-18 DIAGNOSIS — E78.5 HYPERLIPIDEMIA LDL GOAL <70: ICD-10-CM

## 2025-08-18 DIAGNOSIS — G47.33 OBSTRUCTIVE SLEEP APNEA SYNDROME: ICD-10-CM

## 2025-08-18 DIAGNOSIS — I10 ESSENTIAL HYPERTENSION: Chronic | ICD-10-CM

## 2025-08-18 DIAGNOSIS — Z00.00 MEDICARE ANNUAL WELLNESS VISIT, SUBSEQUENT: Primary | ICD-10-CM

## 2025-08-18 DIAGNOSIS — J44.9 COPD, MODERATE (HCC): ICD-10-CM

## 2025-08-18 DIAGNOSIS — C34.91 NON-SMALL CELL CANCER OF RIGHT LUNG (HCC): Primary | ICD-10-CM

## 2025-08-18 DIAGNOSIS — H91.93 BILATERAL HEARING LOSS, UNSPECIFIED HEARING LOSS TYPE: ICD-10-CM

## 2025-08-18 DIAGNOSIS — R41.89 COGNITIVE IMPAIRMENT: ICD-10-CM

## 2025-08-18 DIAGNOSIS — E11.29 TYPE 2 DIABETES MELLITUS WITH MICROALBUMINURIA, WITHOUT LONG-TERM CURRENT USE OF INSULIN (HCC): ICD-10-CM

## 2025-08-18 DIAGNOSIS — R80.9 TYPE 2 DIABETES MELLITUS WITH MICROALBUMINURIA, WITHOUT LONG-TERM CURRENT USE OF INSULIN (HCC): ICD-10-CM

## 2025-08-18 RX ORDER — DONEPEZIL HYDROCHLORIDE 5 MG/1
5 TABLET, FILM COATED ORAL NIGHTLY
Qty: 30 TABLET | Refills: 0 | Status: SHIPPED | OUTPATIENT
Start: 2025-08-18

## 2025-08-18 ASSESSMENT — PATIENT HEALTH QUESTIONNAIRE - PHQ9
2. FEELING DOWN, DEPRESSED OR HOPELESS: NOT AT ALL
SUM OF ALL RESPONSES TO PHQ QUESTIONS 1-9: 0
1. LITTLE INTEREST OR PLEASURE IN DOING THINGS: NOT AT ALL
SUM OF ALL RESPONSES TO PHQ QUESTIONS 1-9: 0

## 2025-08-18 ASSESSMENT — ENCOUNTER SYMPTOMS
CHEST TIGHTNESS: 0
CONSTIPATION: 0
DIARRHEA: 0
SHORTNESS OF BREATH: 0

## 2025-08-25 RX ORDER — OMEPRAZOLE 20 MG/1
20 CAPSULE, DELAYED RELEASE ORAL DAILY
Qty: 90 CAPSULE | Refills: 3 | Status: SHIPPED | OUTPATIENT
Start: 2025-08-25

## 2025-08-25 RX ORDER — DAPAGLIFLOZIN 10 MG/1
10 TABLET, FILM COATED ORAL EVERY MORNING
Qty: 90 TABLET | Refills: 3 | Status: SHIPPED | OUTPATIENT
Start: 2025-08-25

## 2025-08-27 ENCOUNTER — PATIENT MESSAGE (OUTPATIENT)
Dept: INTERNAL MEDICINE CLINIC | Age: 70
End: 2025-08-27

## (undated) DEVICE — INTENDED TO BE USED TO OCCLUDE, RETRACT AND IDENTIFY ARTERIES, VEINS, TENDONS AND NERVES IN SURGICAL PROCEDURES: Brand: STERION®  VESSEL LOOP

## (undated) DEVICE — GLOVE ORANGE PI 7   MSG9070

## (undated) DEVICE — PROBE 8225401 5PK SD-SD BIPOL STIM ROHS

## (undated) DEVICE — GLOVE SURG SZ 65 L12IN FNGR THK79MIL GRN LTX FREE

## (undated) DEVICE — PENCIL ES CRD L10FT HND SWCHING ROCK SWCH W/ EDGE COAT BLDE

## (undated) DEVICE — Y-TYPE TUR/BLADDER IRRIGATION SET, REGULATING CLAMP

## (undated) DEVICE — ADHESIVE LIQ MED 2/3 CC VI DEV REL INJ LO RISK MASTISOL

## (undated) DEVICE — APPLICATOR MEDICATED 26 CC SOLUTION HI LT ORNG CHLORAPREP

## (undated) DEVICE — SYRINGE IRRIG 60ML SFT PLIABLE BLB EZ TO GRP 1 HND USE W/

## (undated) DEVICE — CORD ES L12FT BPLR FRCP

## (undated) DEVICE — NITINOL STONE RETRIEVAL BASKET: Brand: ZERO TIP

## (undated) DEVICE — ELECTRODE PT RET AD L9FT HI MOIST COND ADH HYDRGEL CORDED

## (undated) DEVICE — BAG DRAINAGE NS

## (undated) DEVICE — LOOP VES RADIOPAQUE MAXI 406X1 MM SIL BLU STRL STERION LF

## (undated) DEVICE — REMOTE SLEEP PATIENT INSPIRE

## (undated) DEVICE — ADHESIVE SKIN CLSR 0.7ML TOP DERMBND ADV

## (undated) DEVICE — SYRINGE MED 10ML LUERLOCK TIP W/O SFTY DISP

## (undated) DEVICE — CONTAINER,SPECIMEN,OR STERILE,4OZ: Brand: MEDLINE

## (undated) DEVICE — ANTI-FOG SOLUTION WITH FOAM PAD: Brand: DEVON

## (undated) DEVICE — MERCY FAIRFIELD TURNOVER KIT: Brand: MEDLINE INDUSTRIES, INC.

## (undated) DEVICE — 3M™ STERI-DRAPE™ FLUOROSCOPE DRAPE, 10 PER CARTON / 4 CARTONS PER CASE, 1012: Brand: STERI-DRAPE™

## (undated) DEVICE — MINOR SET UP PACK: Brand: MEDLINE INDUSTRIES, INC.

## (undated) DEVICE — 3M™ STERI-DRAPE™ INSTRUMENT POUCH 1018: Brand: STERI-DRAPE™

## (undated) DEVICE — UROCATCH UROLOGY DRAIN BAGS

## (undated) DEVICE — SKIN MARKER,REGULAR TIP WITH RULER AND LABELS: Brand: DEVON

## (undated) DEVICE — STANDARD HYPODERMIC NEEDLE,POLYPROPYLENE HUB: Brand: MONOJECT

## (undated) DEVICE — GLOVE SURG SZ 6 THK91MIL LTX FREE SYN POLYISOPRENE ANTI

## (undated) DEVICE — SUTURE MCRYL + SZ 4-0 L18IN ABSRB UD L19MM PS-2 3/8 CIR MCP496G

## (undated) DEVICE — PROVE COVER: Brand: UNBRANDED

## (undated) DEVICE — SYRINGE MED 10ML SLIP TIP BLNT FILL AND LUERLOCK DISP

## (undated) DEVICE — SUTURE PERMAHAND SZ 2-0 L18IN NONABSORBABLE BLK L26MM SH C012D

## (undated) DEVICE — MAGNETIC DRAPE: Brand: DEVON

## (undated) DEVICE — TOWEL,OR,DSP,ST,BLUE,STD,8/PK,10PK/CS: Brand: MEDLINE

## (undated) DEVICE — SOLUTION IRRIGATION STRL H2O 1000 ML UROMATIC CONTAINER

## (undated) DEVICE — GLOVE SURG SZ 65 L12IN FNGR THK94MIL STD WHT LTX FREE

## (undated) DEVICE — BLADE, TONGUE, 6", STERILE: Brand: MEDLINE

## (undated) DEVICE — 3M™ IOBAN™ 2 ANTIMICROBIAL INCISE DRAPE 6650EZ: Brand: IOBAN™ 2

## (undated) DEVICE — BLADE ES ELASTOMERIC COAT INSUL DURABLE BEND UPTO 90DEG

## (undated) DEVICE — SUTURE VCRL + SZ 3-0 L18IN ABSRB UD SH 1/2 CIR TAPERCUT NDL VCP864D

## (undated) DEVICE — GUIDEWIRE ENDOSCP L150CM DIA0.035IN TIP 3CM PTFE NIT

## (undated) DEVICE — NEEDLE FLTR 19GA L1.5IN WALL THK5UM BRN POLYPR HUB S STL

## (undated) DEVICE — NEEDLE HYPO 25GA L1.5IN BLU POLYPR HUB S STL REG BVL STR

## (undated) DEVICE — CYSTO PACK: Brand: MEDLINE INDUSTRIES, INC.

## (undated) DEVICE — ADAPTER URO SCP UROLOK LL

## (undated) DEVICE — 3M™ STERI-STRIP™ REINFORCED ADHESIVE SKIN CLOSURES, R1547, 1/2 IN X 4 IN (12 MM X 100 MM), 6 STRIPS/ENVELOPE: Brand: 3M™ STERI-STRIP™

## (undated) DEVICE — SUTURE NONABSORBABLE MONOFILAMENT 5-0 PS-2 18 IN BLK ETHILON 1666H

## (undated) DEVICE — TOWEL,OR,DSP,ST,WHITE,DLX,4/PK,20PK/CS: Brand: MEDLINE

## (undated) DEVICE — GOWN,SURGICAL,AURORA,SLEEVE: Brand: MEDLINE

## (undated) DEVICE — HEAD AND NECK PACK: Brand: CONVERTORS

## (undated) DEVICE — CATHETER IV 20 GAX1 IN N WNG KINK RESIST INSYT AUTOGRD

## (undated) DEVICE — ELECTRODE 8227304 5PK PRASS PR 18MM ROHS

## (undated) DEVICE — SUTURE PERMA-HAND SZ 3-0 L18IN NONABSORBABLE BLK RB-1 L17MM C053D

## (undated) DEVICE — SOLUTION IRRIG 500ML 0.9% SOD CHL USP POUR PLAS BTL

## (undated) DEVICE — SPONGE,DISSECTOR,K,XRAY,9/16"X1/4",STRL: Brand: MEDLINE

## (undated) DEVICE — SOLUTION IRRIG 1000ML STRL H2O USP PLAS POUR BTL

## (undated) DEVICE — CODMAN® SURGICAL PATTIES 1/2" X 3" (1.27CM X 7.62CM): Brand: CODMAN®

## (undated) DEVICE — Device: Brand: MEDEX

## (undated) DEVICE — WET SKIN PREP TRAY: Brand: MEDLINE INDUSTRIES, INC.

## (undated) DEVICE — FIBER ENT HOLM LSR 200 MIC STRL LTX FRE

## (undated) DEVICE — SHEARS ENDOSCP L9CM CRV HARM FOCS +